# Patient Record
Sex: FEMALE | Race: WHITE | NOT HISPANIC OR LATINO | Employment: OTHER | URBAN - NONMETROPOLITAN AREA
[De-identification: names, ages, dates, MRNs, and addresses within clinical notes are randomized per-mention and may not be internally consistent; named-entity substitution may affect disease eponyms.]

---

## 2023-04-19 ENCOUNTER — APPOINTMENT (EMERGENCY)
Dept: CT IMAGING | Facility: HOSPITAL | Age: 73
End: 2023-04-19

## 2023-04-19 ENCOUNTER — APPOINTMENT (EMERGENCY)
Dept: RADIOLOGY | Facility: HOSPITAL | Age: 73
End: 2023-04-19

## 2023-04-19 ENCOUNTER — HOSPITAL ENCOUNTER (INPATIENT)
Facility: HOSPITAL | Age: 73
LOS: 8 days | Discharge: NURSING, REHAB OR LONG-TERM CARE | End: 2023-04-27
Attending: EMERGENCY MEDICINE | Admitting: FAMILY MEDICINE

## 2023-04-19 DIAGNOSIS — K92.1 HEMATOCHEZIA: ICD-10-CM

## 2023-04-19 DIAGNOSIS — K31.7 GASTRIC POLYP: ICD-10-CM

## 2023-04-19 DIAGNOSIS — R42 DIZZINESS: ICD-10-CM

## 2023-04-19 DIAGNOSIS — K31.89 GASTRIC MASS: ICD-10-CM

## 2023-04-19 DIAGNOSIS — F31.9 BIPOLAR DEPRESSION (HCC): ICD-10-CM

## 2023-04-19 DIAGNOSIS — R55 SYNCOPE: Primary | ICD-10-CM

## 2023-04-19 LAB
2HR DELTA HS TROPONIN: 1 NG/L
4HR DELTA HS TROPONIN: 0 NG/L
ALBUMIN SERPL BCP-MCNC: 4.3 G/DL (ref 3.5–5)
ALP SERPL-CCNC: 97 U/L (ref 34–104)
ALT SERPL W P-5'-P-CCNC: 36 U/L (ref 7–52)
ANION GAP SERPL CALCULATED.3IONS-SCNC: 11 MMOL/L (ref 4–13)
APTT PPP: 24 SECONDS (ref 23–37)
AST SERPL W P-5'-P-CCNC: 22 U/L (ref 13–39)
BASOPHILS # BLD AUTO: 0.04 THOUSANDS/ΜL (ref 0–0.1)
BASOPHILS NFR BLD AUTO: 0 % (ref 0–1)
BILIRUB SERPL-MCNC: 0.81 MG/DL (ref 0.2–1)
BILIRUB UR QL STRIP: NEGATIVE
BUN SERPL-MCNC: 9 MG/DL (ref 5–25)
CALCIUM SERPL-MCNC: 9.4 MG/DL (ref 8.4–10.2)
CARDIAC TROPONIN I PNL SERPL HS: 2 NG/L
CARDIAC TROPONIN I PNL SERPL HS: 2 NG/L
CARDIAC TROPONIN I PNL SERPL HS: 3 NG/L
CHLORIDE SERPL-SCNC: 104 MMOL/L (ref 96–108)
CK SERPL-CCNC: 68 U/L (ref 26–192)
CLARITY UR: CLEAR
CO2 SERPL-SCNC: 27 MMOL/L (ref 21–32)
COLOR UR: YELLOW
CREAT SERPL-MCNC: 0.9 MG/DL (ref 0.6–1.3)
EOSINOPHIL # BLD AUTO: 0.03 THOUSAND/ΜL (ref 0–0.61)
EOSINOPHIL NFR BLD AUTO: 0 % (ref 0–6)
ERYTHROCYTE [DISTWIDTH] IN BLOOD BY AUTOMATED COUNT: 14.5 % (ref 11.6–15.1)
GFR SERPL CREATININE-BSD FRML MDRD: 64 ML/MIN/1.73SQ M
GLUCOSE SERPL-MCNC: 109 MG/DL (ref 65–140)
GLUCOSE SERPL-MCNC: 117 MG/DL (ref 65–140)
GLUCOSE UR STRIP-MCNC: NEGATIVE MG/DL
HCT VFR BLD AUTO: 44.1 % (ref 34.8–46.1)
HGB BLD-MCNC: 14.4 G/DL (ref 11.5–15.4)
HGB UR QL STRIP.AUTO: NEGATIVE
IMM GRANULOCYTES # BLD AUTO: 0.13 THOUSAND/UL (ref 0–0.2)
IMM GRANULOCYTES NFR BLD AUTO: 1 % (ref 0–2)
INR PPP: 1.06 (ref 0.84–1.19)
KETONES UR STRIP-MCNC: NEGATIVE MG/DL
LEUKOCYTE ESTERASE UR QL STRIP: NEGATIVE
LYMPHOCYTES # BLD AUTO: 1.23 THOUSANDS/ΜL (ref 0.6–4.47)
LYMPHOCYTES NFR BLD AUTO: 11 % (ref 14–44)
MAGNESIUM SERPL-MCNC: 1.7 MG/DL (ref 1.9–2.7)
MCH RBC QN AUTO: 29.1 PG (ref 26.8–34.3)
MCHC RBC AUTO-ENTMCNC: 32.7 G/DL (ref 31.4–37.4)
MCV RBC AUTO: 89 FL (ref 82–98)
MONOCYTES # BLD AUTO: 0.7 THOUSAND/ΜL (ref 0.17–1.22)
MONOCYTES NFR BLD AUTO: 6 % (ref 4–12)
NEUTROPHILS # BLD AUTO: 9.5 THOUSANDS/ΜL (ref 1.85–7.62)
NEUTS SEG NFR BLD AUTO: 82 % (ref 43–75)
NITRITE UR QL STRIP: NEGATIVE
NRBC BLD AUTO-RTO: 0 /100 WBCS
PH UR STRIP.AUTO: 6.5 [PH]
PLATELET # BLD AUTO: 331 THOUSANDS/UL (ref 149–390)
PMV BLD AUTO: 9.3 FL (ref 8.9–12.7)
POTASSIUM SERPL-SCNC: 3.3 MMOL/L (ref 3.5–5.3)
PROT SERPL-MCNC: 7 G/DL (ref 6.4–8.4)
PROT UR STRIP-MCNC: NEGATIVE MG/DL
PROTHROMBIN TIME: 14 SECONDS (ref 11.6–14.5)
RBC # BLD AUTO: 4.95 MILLION/UL (ref 3.81–5.12)
SODIUM SERPL-SCNC: 142 MMOL/L (ref 135–147)
SP GR UR STRIP.AUTO: 1.01 (ref 1–1.03)
UROBILINOGEN UR QL STRIP.AUTO: 0.2 E.U./DL
WBC # BLD AUTO: 11.63 THOUSAND/UL (ref 4.31–10.16)

## 2023-04-19 PROCEDURE — 0HQ0XZZ REPAIR SCALP SKIN, EXTERNAL APPROACH: ICD-10-PCS | Performed by: EMERGENCY MEDICINE

## 2023-04-19 RX ORDER — SODIUM CHLORIDE 9 MG/ML
100 INJECTION, SOLUTION INTRAVENOUS CONTINUOUS
Status: DISCONTINUED | OUTPATIENT
Start: 2023-04-19 | End: 2023-04-19

## 2023-04-19 RX ORDER — SODIUM CHLORIDE, SODIUM GLUCONATE, SODIUM ACETATE, POTASSIUM CHLORIDE, MAGNESIUM CHLORIDE, SODIUM PHOSPHATE, DIBASIC, AND POTASSIUM PHOSPHATE .53; .5; .37; .037; .03; .012; .00082 G/100ML; G/100ML; G/100ML; G/100ML; G/100ML; G/100ML; G/100ML
100 INJECTION, SOLUTION INTRAVENOUS CONTINUOUS
Status: DISCONTINUED | OUTPATIENT
Start: 2023-04-19 | End: 2023-04-20

## 2023-04-19 RX ORDER — PANTOPRAZOLE SODIUM 40 MG/1
40 TABLET, DELAYED RELEASE ORAL 2 TIMES DAILY
COMMUNITY
End: 2023-04-27

## 2023-04-19 RX ORDER — LORAZEPAM 1 MG/1
4 TABLET ORAL
Status: DISCONTINUED | OUTPATIENT
Start: 2023-04-19 | End: 2023-04-22

## 2023-04-19 RX ORDER — LIDOCAINE HYDROCHLORIDE 10 MG/ML
10 INJECTION, SOLUTION EPIDURAL; INFILTRATION; INTRACAUDAL; PERINEURAL ONCE
Status: COMPLETED | OUTPATIENT
Start: 2023-04-19 | End: 2023-04-19

## 2023-04-19 RX ORDER — ENOXAPARIN SODIUM 100 MG/ML
40 INJECTION SUBCUTANEOUS DAILY
Status: DISCONTINUED | OUTPATIENT
Start: 2023-04-20 | End: 2023-04-24

## 2023-04-19 RX ORDER — LAMOTRIGINE 100 MG/1
200 TABLET ORAL 2 TIMES DAILY
Status: DISCONTINUED | OUTPATIENT
Start: 2023-04-19 | End: 2023-04-27 | Stop reason: HOSPADM

## 2023-04-19 RX ORDER — LORAZEPAM 2 MG/1
4 TABLET ORAL
COMMUNITY
End: 2023-04-27

## 2023-04-19 RX ORDER — ATORVASTATIN CALCIUM 40 MG/1
40 TABLET, FILM COATED ORAL DAILY
COMMUNITY
Start: 2022-12-21

## 2023-04-19 RX ORDER — ATORVASTATIN CALCIUM 40 MG/1
40 TABLET, FILM COATED ORAL DAILY
Status: DISCONTINUED | OUTPATIENT
Start: 2023-04-20 | End: 2023-04-27 | Stop reason: HOSPADM

## 2023-04-19 RX ORDER — LOSARTAN POTASSIUM 25 MG/1
25 TABLET ORAL DAILY
COMMUNITY

## 2023-04-19 RX ORDER — MECLIZINE HYDROCHLORIDE 25 MG/1
25 TABLET ORAL ONCE
Status: COMPLETED | OUTPATIENT
Start: 2023-04-19 | End: 2023-04-19

## 2023-04-19 RX ORDER — ACETAMINOPHEN 325 MG/1
650 TABLET ORAL EVERY 6 HOURS PRN
Status: DISCONTINUED | OUTPATIENT
Start: 2023-04-19 | End: 2023-04-27 | Stop reason: HOSPADM

## 2023-04-19 RX ORDER — POTASSIUM CHLORIDE 20 MEQ/1
40 TABLET, EXTENDED RELEASE ORAL ONCE
Status: COMPLETED | OUTPATIENT
Start: 2023-04-19 | End: 2023-04-19

## 2023-04-19 RX ORDER — LORAZEPAM 1 MG/1
1 TABLET ORAL 2 TIMES DAILY
Status: DISCONTINUED | OUTPATIENT
Start: 2023-04-20 | End: 2023-04-27 | Stop reason: HOSPADM

## 2023-04-19 RX ORDER — PANTOPRAZOLE SODIUM 40 MG/1
40 TABLET, DELAYED RELEASE ORAL
Status: DISCONTINUED | OUTPATIENT
Start: 2023-04-20 | End: 2023-04-24

## 2023-04-19 RX ORDER — LORAZEPAM 2 MG/1
1 TABLET ORAL 2 TIMES DAILY
Status: ON HOLD | COMMUNITY
End: 2023-04-24 | Stop reason: SDUPTHER

## 2023-04-19 RX ORDER — LAMOTRIGINE 200 MG/1
200 TABLET ORAL 2 TIMES DAILY
COMMUNITY

## 2023-04-19 RX ORDER — HYDROCHLOROTHIAZIDE 12.5 MG/1
CAPSULE, GELATIN COATED ORAL
COMMUNITY
Start: 2023-03-16 | End: 2023-04-27

## 2023-04-19 RX ORDER — ONDANSETRON 2 MG/ML
4 INJECTION INTRAMUSCULAR; INTRAVENOUS ONCE
Status: COMPLETED | OUTPATIENT
Start: 2023-04-19 | End: 2023-04-19

## 2023-04-19 RX ADMIN — IOHEXOL 100 ML: 350 INJECTION, SOLUTION INTRAVENOUS at 15:17

## 2023-04-19 RX ADMIN — ONDANSETRON 4 MG: 2 INJECTION INTRAMUSCULAR; INTRAVENOUS at 18:25

## 2023-04-19 RX ADMIN — MORPHINE SULFATE 2 MG: 2 INJECTION, SOLUTION INTRAMUSCULAR; INTRAVENOUS at 17:02

## 2023-04-19 RX ADMIN — LORAZEPAM 4 MG: 1 TABLET ORAL at 21:26

## 2023-04-19 RX ADMIN — TETANUS TOXOID, REDUCED DIPHTHERIA TOXOID AND ACELLULAR PERTUSSIS VACCINE, ADSORBED 0.5 ML: 5; 2.5; 8; 8; 2.5 SUSPENSION INTRAMUSCULAR at 15:34

## 2023-04-19 RX ADMIN — POTASSIUM CHLORIDE 40 MEQ: 1500 TABLET, EXTENDED RELEASE ORAL at 21:26

## 2023-04-19 RX ADMIN — SODIUM CHLORIDE 1000 ML: 0.9 INJECTION, SOLUTION INTRAVENOUS at 18:50

## 2023-04-19 RX ADMIN — LIDOCAINE HYDROCHLORIDE 10 ML: 10 INJECTION, SOLUTION EPIDURAL; INFILTRATION; INTRACAUDAL; PERINEURAL at 16:02

## 2023-04-19 RX ADMIN — SODIUM CHLORIDE, SODIUM GLUCONATE, SODIUM ACETATE, POTASSIUM CHLORIDE AND MAGNESIUM CHLORIDE 100 ML/HR: 526; 502; 368; 37; 30 INJECTION, SOLUTION INTRAVENOUS at 21:26

## 2023-04-19 RX ADMIN — LAMOTRIGINE 200 MG: 100 TABLET ORAL at 21:26

## 2023-04-19 RX ADMIN — MORPHINE SULFATE 2 MG: 2 INJECTION, SOLUTION INTRAMUSCULAR; INTRAVENOUS at 16:04

## 2023-04-19 RX ADMIN — MECLIZINE HYDROCHLORIDE 25 MG: 25 TABLET ORAL at 18:25

## 2023-04-19 NOTE — TRAUMA DOCUMENTATION
Pt had blood in her hair, Pt hair washed to get most of dried blood out   Pt jazmyn well and was thankful for the help

## 2023-04-19 NOTE — DISCHARGE INSTRUCTIONS
TRAUMA - CT head wo contrast   Final Result      No acute intracranial abnormality  Workstation performed: [id number]         TRAUMA - CT spine cervical wo contrast   Final Result      No cervical spine fracture or traumatic malalignment  Workstation performed: [id number]         TRAUMA - CT chest abdomen pelvis w contrast   Final Result      No CT findings of trauma  Polypoid enhancing mass in the gastric body along the lateral wall in series 2 image 111 measuring 1 8 x 1 3 cm  Correlate with GI symptoms and GI consult  Workstation performed: [id number]         XR Trauma chest portable   Final Result      No acute cardiopulmonary disease  Workstation performed: [id number]         XR Trauma pelvis ap only 1 or 2 vw   Final Result      No acute osseous abnormality        Workstation performed: [id number]

## 2023-04-19 NOTE — ED NOTES
1 staple inserted by Natali KANG to close scalp wound  Bleeding controlled       [address] RN  04/19/23 3954

## 2023-04-19 NOTE — ED NOTES
Pt failed ambulatory test  Pt stated she felt dizzy upon sitting up but was able to stand  by herself ,however upon starting to walk pt got very dizzy and was off balance   Returned to bed and felt nauseated     Migue Escobedo RN  04/19/23 2159

## 2023-04-19 NOTE — ED PROVIDER NOTES
Emergency Department Trauma Note  Art Borjas 81 y o  female MRN: [id number]  Unit/Bed#: ED 02/ED 02 Encounter: [id number]      Trauma Alert: Trauma Acuity: Trauma Evaluation  Model of Arrival: Mode of Arrival: ALS via    Trauma Team: Current Providers  Attending Provider: Tatianna Velazquez DO  Attending Provider: Lanie Tyler MD  Physician Assistant: Dilma Villasenor PA-C  Registered Nurse: Daniel Walker, RN  Registered Nurse: Madonna Haley RN  Consultants:     None      History of Present Illness     Chief Complaint:   Chief Complaint   Patient presents with   • Syncope     That resulted in falling down about 12 wooden steps  Biggest complaint is head  and neck pain  Also has a small abrasion on her right knee     HPI:  Art Borjas is a 81 y o  female who presents with headache/neck pain s/p syncope and fall  Mechanism:Details of Incident: patient was on toilet and stook up, falling into boxes and then down about 12 steps  Injury Date: 04/19/23 Injury Time: 1333 Injury Occurence Location - [address] [place] [address] Way: at home    30-year-old female presents the emergency department for evaluation  Patient states earlier today she sat down on the toilet after a shower to put her underwear on  States she was feeling dizzy and stood up to walk to her bedroom  States while walking to her bedroom the dizziness worsened and the last thing she remembers is seeing boxes in her bedroom  States she then passed out  Woke up down a flight of 12 steps  She does not remember falling down the steps  States when she did come to she was able to crawl to the phone and call for help  Reports she has headache and neck pain  Also reports pain on the inner aspect of the right thigh near the groin  She is small abrasion to the right knee  No visual changes  Patient denies any use of anticoagulation  She denies any chest pain, palpitations, shortness of breath  Denies any nausea or vomiting  Denies any abdominal pain    She The pt was encountered on [employer]- she is on a [employer] P 500 support surface for low air-loss and pressure redistribution features. Staff have been assisting her with turning and positioning. Mrs Lewis has Complete [employer] boots in place to her heels for off-loading. She was seen by nutrition and had a SLP evaluation as well.   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 the periwound skin is intact. The feet are dry and flaking in general, an area of dark, dry flaking tissue was noted on the right heel as well.  The PT pulse on the left foot was weak, the DP pulse on the right foot was +.    the area was tender to touch.  Discussed with the medical team they will f/u with a Vascular consult for further evaluation, Spoke with wound team Podiatrist Dr Brock and requested a Podiatry consult for further evaluation as well. denies any loss of bowel or bladder control  Has not eaten today  Reports she took her morning pills and then went to get a shower which is why she hadn't eaten  States she has had occasional diarrhea recently  Also reports recent unintentional weight loss  History of breast cancer 12 years ago  History provided by:  Patient  Syncope  Episode history:  Single  Most recent episode: Today  Duration: Unsure  Progression:  Partially resolved  Chronicity:  New  Witnessed: no    Associated symptoms: dizziness and headaches    Associated symptoms: no diaphoresis, no fever, no nausea and no seizures      Review of Systems   Constitutional: Negative for appetite change, chills, diaphoresis, fatigue and fever  Respiratory: Negative  Cardiovascular: Positive for syncope  Gastrointestinal: Negative for nausea  Musculoskeletal: Positive for arthralgias, back pain and neck pain  Skin: Positive for wound  Neurological: Positive for dizziness, syncope and headaches  Negative for seizures, facial asymmetry and speech difficulty  Psychiatric/Behavioral: The patient is nervous/anxious  All other systems reviewed and are negative  Historical Information     Immunizations:   Immunization History   Administered Date(s) Administered   • Tdap 04/19/2023       Past Medical History:   Diagnosis Date   • Anxiety    • Disease of thyroid gland    • Hypertension      History reviewed  No pertinent family history  History reviewed  No pertinent surgical history  Social History     Tobacco Use   • Smoking status: Never   • Smokeless tobacco: Never   Vaping Use   • Vaping Use: Never used   Substance Use Topics   • Alcohol use: Never   • Drug use: Never     E-Cigarette/Vaping   • E-Cigarette Use Never User      E-Cigarette/Vaping Substances       Family History: non-contributory    Meds/Allergies   Prior to Admission Medications   Prescriptions Last Dose Informant Patient Reported? Taking?    LORazepam (ATIVAN) 2 mg tablet 4/19/2023  Yes Yes   Sig: Take 1 mg by mouth 2 (two) times a day Morning and afternoon   LORazepam (ATIVAN) 2 mg tablet 4/18/2023  Yes Yes   Sig: Take 4 mg by mouth daily at bedtime   OMEPRAZOLE PO   Yes No   Sig: Take by mouth   atorvastatin (LIPITOR) 40 mg tablet 4/19/2023  Yes Yes   Sig: Take 40 mg by mouth daily   hydrochlorothiazide (MICROZIDE) 12 5 mg capsule 4/19/2023  Yes Yes   Sig: TAKE 1 CAPSULE BY MOUTH EVERY MORNING FOR BLOOD PRESSURE *PT CANNOT CUT TABLETS, SEND FULL TABS/CAPS*   lamoTRIgine (LaMICtal) 200 MG tablet 4/19/2023  Yes Yes   Sig: Take 200 mg by mouth 2 (two) times a day   losartan (COZAAR) 25 mg tablet 4/19/2023  Yes Yes   Sig: Take 25 mg by mouth daily   pantoprazole (PROTONIX) 40 mg tablet 4/19/2023  Yes Yes   Sig: Take 40 mg by mouth 2 (two) times a day      Facility-Administered Medications: None       Allergies   Allergen Reactions   • Vitamin B12 Hives   • Erythromycin Other (See Comments)     Vomiting  • Escitalopram Other (See Comments)   • Lisinopril Cough   • Other Itching and Dizziness   • Paroxetine Other (See Comments)       PHYSICAL EXAM    PE limited by: none    Objective   Vitals:   First set: Temperature: 98 4 °F (36 9 °C) (04/19/23 1452)  Pulse: 81 (04/19/23 1452)  Respirations: 16 (04/19/23 1452)  Blood Pressure: (!) 217/112 (04/19/23 1452)  SpO2: 93 % (04/19/23 1452)    Primary Survey:   (A) Airway: Intact  (B) Breathing: Spontaneously  (C) Circulation: Pulses:   normal  (D) Disabliity:  GCS Total:  15  (E) Expose:  Completed    Secondary Survey: (Click on Physical Exam tab above)  Physical Exam    Cervical spine cleared by clinical criteria?  No (imaging required)      Invasive Devices     Peripheral Intravenous Line  Duration           Peripheral IV 04/19/23 Proximal;Right;Ventral (anterior) Forearm <1 day    Peripheral IV 04/19/23 Right Antecubital <1 day                Lab Results:   Results Reviewed     Procedure Component Value Units Date/Time    HS Troponin I 4hr [[id number]]  (Normal) Collected: 04/19/23 1928    Lab Status: Final result Specimen: Blood from Arm, Right Updated: 04/19/23 1958     hs TnI 4hr 2 ng/L      Delta 4hr hsTnI 0 ng/L     HS Troponin I 2hr [[id number]]  (Normal) Collected: 04/19/23 1725    Lab Status: Final result Specimen: Blood from Arm, Right Updated: 04/19/23 1807     hs TnI 2hr 3 ng/L      Delta 2hr hsTnI 1 ng/L     HS Troponin 0hr (reflex protocol) [[id number]]  (Normal) Collected: 04/19/23 1525    Lab Status: Final result Specimen: Blood from Arm, Right Updated: 04/19/23 1558     hs TnI 0hr 2 ng/L     UA w Reflex to Microscopic w Reflex to Culture [[id number]] Collected: 04/19/23 1551    Lab Status: Final result Specimen: Urine, Clean Catch Updated: 04/19/23 1555     Color, UA Yellow     Clarity, UA Clear     Specific Gravity, UA 1 010     pH, UA 6 5     Leukocytes, UA Negative     Nitrite, UA Negative     Protein, UA Negative mg/dl      Glucose, UA Negative mg/dl      Ketones, UA Negative mg/dl      Urobilinogen, UA 0 2 E U /dl      Bilirubin, UA Negative     Occult Blood, UA Negative    Comprehensive metabolic panel [[id number]]  (Abnormal) Collected: 04/19/23 1525    Lab Status: Final result Specimen: Blood from Arm, Right Updated: 04/19/23 1550     Sodium 142 mmol/L      Potassium 3 3 mmol/L      Chloride 104 mmol/L      CO2 27 mmol/L      ANION GAP 11 mmol/L      BUN 9 mg/dL      Creatinine 0 90 mg/dL      Glucose 117 mg/dL      Calcium 9 4 mg/dL      AST 22 U/L      ALT 36 U/L      Alkaline Phosphatase 97 U/L      Total Protein 7 0 g/dL      Albumin 4 3 g/dL      Total Bilirubin 0 81 mg/dL      eGFR 64 ml/min/1 73sq m     Narrative:      Nathan guidelines for Chronic Kidney Disease (CKD):   •  Stage 1 with normal or high GFR (GFR > 90 mL/min/1 73 square meters)  •  Stage 2 Mild CKD (GFR = 60-89 mL/min/1 73 square meters)  •  Stage 3A Moderate CKD (GFR = 45-59 mL/min/1 73 square meters)  •  Stage 3B Moderate CKD (GFR = 30-44 mL/min/1 73 square meters)  •  Stage 4 Severe CKD (GFR = 15-29 mL/min/1 73 square meters)  •  Stage 5 End Stage CKD (GFR <15 mL/min/1 73 square meters)  Note: GFR calculation is accurate only with a steady state creatinine    CK [[id number]]  (Normal) Collected: 04/19/23 1525    Lab Status: Final result Specimen: Blood from Arm, Right Updated: 04/19/23 1550     Total CK 68 U/L     Protime-INR [[id number]]  (Normal) Collected: 04/19/23 1525    Lab Status: Final result Specimen: Blood from Arm, Right Updated: 04/19/23 1547     Protime 14 0 seconds      INR 1 06    APTT [[id number]]  (Normal) Collected: 04/19/23 1525    Lab Status: Final result Specimen: Blood from Arm, Right Updated: 04/19/23 1547     PTT 24 seconds     Fingerstick Glucose (POCT) [[id number]]  (Normal) Collected: 04/19/23 1539    Lab Status: Final result Updated: 04/19/23 1540     POC Glucose 109 mg/dl     CBC and differential [[id number]]  (Abnormal) Collected: 04/19/23 1525    Lab Status: Final result Specimen: Blood from Arm, Right Updated: 04/19/23 1534     WBC 11 63 Thousand/uL      RBC 4 95 Million/uL      Hemoglobin 14 4 g/dL      Hematocrit 44 1 %      MCV 89 fL      MCH 29 1 pg      MCHC 32 7 g/dL      RDW 14 5 %      MPV 9 3 fL      Platelets 798 Thousands/uL      nRBC 0 /100 WBCs      Neutrophils Relative 82 %      Immat GRANS % 1 %      Lymphocytes Relative 11 %      Monocytes Relative 6 %      Eosinophils Relative 0 %      Basophils Relative 0 %      Neutrophils Absolute 9 50 Thousands/µL      Immature Grans Absolute 0 13 Thousand/uL      Lymphocytes Absolute 1 23 Thousands/µL      Monocytes Absolute 0 70 Thousand/µL      Eosinophils Absolute 0 03 Thousand/µL      Basophils Absolute 0 04 Thousands/µL                  Imaging Studies:   Direct to CT: No  TRAUMA - CT head wo contrast   Final Result by Josie Stewart MD (04/19 1547)      No acute intracranial abnormality                    Workstation performed: [id number]         TRAUMA - CT spine cervical wo contrast   Final Result by Gaby Mcgill MD (04/19 1547)      No cervical spine fracture or traumatic malalignment  Workstation performed: [id number]         TRAUMA - CT chest abdomen pelvis w contrast   Final Result by Gaby Mcgill MD (04/19 1547)      No CT findings of trauma  Polypoid enhancing mass in the gastric body along the lateral wall in series 2 image 111 measuring 1 8 x 1 3 cm  Correlate with GI symptoms and GI consult  Workstation performed: [id number]         XR Trauma chest portable   Final Result by Gaby Mcgill MD (04/19 8426)      No acute cardiopulmonary disease  Workstation performed: [id number]         XR Trauma pelvis ap only 1 or 2 vw   Final Result by Gaby Mcgill MD (04/19 1527)      No acute osseous abnormality        Workstation performed: [id number]               Procedures  ECG 12 Lead Documentation Only    Date/Time: 4/19/2023 3:36 PM  Performed by: Joe Chowdhury PA-C  Authorized by: Joe Chowdhury PA-C     Indications / Diagnosis:  Syncope  Patient location:  ED  Interpretation:     Interpretation: non-specific    Rate:     ECG rate:  76    ECG rate assessment: normal    Rhythm:     Rhythm: sinus rhythm    Ectopy:     Ectopy: none    QRS:     QRS axis:  Normal    QRS intervals:  Normal  ECG 12 Lead Documentation Only    Date/Time: 4/19/2023 6:50 PM  Performed by: Joe Chowdhury PA-C  Authorized by: Joe Chowdhury PA-C     Indications / Diagnosis:  Near sycope, hypotension  Patient location:  ED  Interpretation:     Interpretation: non-specific    Rate:     ECG rate:  63    ECG rate assessment: normal    Rhythm:     Rhythm: sinus rhythm    Ectopy:     Ectopy: none    QRS:     QRS axis:  Normal    QRS intervals:  Normal  Conduction:     Conduction: normal    Laceration repair    Date/Time: 4/19/2023 4:00 PM  Performed by: Joe Chowdhury "SAM  Authorized by: Tammy Ramirez PA-C   Risks and benefits: risks, benefits and alternatives were discussed  Consent given by: patient  Patient understanding: patient states understanding of the procedure being performed  Patient consent: the patient's understanding of the procedure matches consent given  Radiology Images displayed and confirmed  If images not available, report reviewed: imaging studies available  Patient identity confirmed: verbally with patient and arm band  Time out: Immediately prior to procedure a \"time out\" was called to verify the correct patient, procedure, equipment, support staff and site/side marked as required  Body area: head/neck  Location details: scalp  Laceration length: 3 cm  Foreign bodies: no foreign bodies  Tendon involvement: none  Nerve involvement: none    Sedation:  Patient sedated: no      Wound Dehiscence:  Superficial Wound Dehiscence: simple closure      Procedure Details:  Irrigation solution: saline  Irrigation method: syringe  Amount of cleaning: standard  Debridement: none  Degree of undermining: none  Skin closure: staples and glue  Patient tolerance: patient tolerated the procedure well with no immediate complications  Comments: Patient was offered local infiltrate of analgesia and refused  1 staple was placed  Hair tie technique and skin glue applied               ED Course  ED Course as of 04/19/23 2001 Wed Apr 19, 2023   1504  trauma evaluation called on arrival   1514  ED interpretation of chest x-ray is negative for  pneumothorax  ED interpretation of pelvic x-ray negative for acute osseous injury   1529 Chest xray: No acute cardiopulmonary disease  1530 Pelvis xray: No acute osseous abnormality  1552 CT head: No acute intracranial abnormality  1552 CT cspine: No cervical spine fracture or traumatic malalignment     1552 CT chestabdpelvis: No CT findings of trauma      Polypoid enhancing mass in the gastric body along the lateral wall in series 2 " image 111 measuring 1 8 x 1 3 cm  Correlate with GI symptoms and GI consult  1552 Cervical Collar Clearance: The patient had a CT scan of the cervical spine demonstrating no acute injury  On exam, the patient had no midline point tenderness or paresthesias/numbness/weakness in the extremities  The patient had full range of motion (was then able to flex, extend, and rotate head laterally) without pain  There were no distracting injuries and the patient was not intoxicated  The patient's cervical spine was cleared radiologically and clinically  Cervical collar removed at this time  Jonathan Andrew PA-C  4/19/2023 3:52 PM        1610 hs TnI 0hr: 2   1657 Patient still have CABA  Will give additional pain medications   1807 Delta 2hr hsTnI: 1   1811 Headache improved  Patient will try to stand and ambulate  1821 Patient was able to stand but unable to ambulate  Felt dizzy and the room was going to spinning  Got nauseated   1855 Patient received Zofran and meclizine  Was then laying and started to feel unwell  Blood pressure dropped to 70/44 patient became pale  Hypoxic  Currently requiring 2 5 L nasal cannula  EKG was obtained, unchanged from previous  Patient was started on fluids placed in Trendelenburg  BP increasing  Patient with some improvement  3011 Accepted to medicine           Medical Decision Making  68-year-old female presented to the emergency department for evaluation status post syncopal episode  Vitals and medical record reviewed  Differential diagnosis included but not limited to MI, vasovagal syncope, electrolyte abnormality, traumatic injury  Trauma evaluation was called on arrival   Patient was pan scanned which was negative for traumatic injury noted for gastric mats which patient was made aware of  Patient's EKG was nonischemic, troponin negative x2  Electrolytes relatively unremarkable  Attempted to have patient's stand and ambulate she became dizzy and nauseated  Was treated symptomatically  Symptoms worsened and medication became hypoxic and hypotensive  Improved with fluids  Patient tolerated p o  diet  Wilner with medicine who accepted the patient for admission for continued evaluation and work up  Gastric mass: acute illness or injury  Syncope: acute illness or injury  Amount and/or Complexity of Data Reviewed  Labs: ordered  Decision-making details documented in ED Course  Radiology: ordered  ECG/medicine tests: ordered and independent interpretation performed  Discussion of management or test interpretation with external provider(s): Discussion with medicine for admission    Risk  Prescription drug management  Decision regarding hospitalization  Disposition  [facility] Transfer: No  Final diagnoses:   Syncope   Gastric mass     Time reflects when diagnosis was documented in both MDM as applicable and the Disposition within this note     Time User Action Codes Description Comment    4/19/2023  7:18 PM Tierra Loya [R55] Syncope     4/19/2023  7:18 PM Tierra Loya [K31 89] Gastric mass       ED Disposition     ED Disposition   Admit    Condition   Stable    Date/Time   Wed Apr 19, 2023  7:18 PM    Comment   Case was discussed with Giuseppe and the patient's admission status was agreed to be Admission Status: inpatient status to the service of Dr Juan Orr   Follow-up Information    None       Patient's Medications   Discharge Prescriptions    No medications on file     No discharge procedures on file      PDMP Review     None          ED Provider  Electronically Signed by         Jody Velazquez PA-C  04/19/23 2001

## 2023-04-20 ENCOUNTER — APPOINTMENT (INPATIENT)
Dept: MRI IMAGING | Facility: HOSPITAL | Age: 73
End: 2023-04-20

## 2023-04-20 PROBLEM — R19.7 DIARRHEA OF PRESUMED INFECTIOUS ORIGIN: Status: ACTIVE | Noted: 2023-04-20

## 2023-04-20 PROBLEM — E43 SEVERE PROTEIN-CALORIE MALNUTRITION (HCC): Status: ACTIVE | Noted: 2023-04-20

## 2023-04-20 PROBLEM — K31.7 GASTRIC POLYP: Status: ACTIVE | Noted: 2023-04-20

## 2023-04-20 PROBLEM — F31.9 BIPOLAR DEPRESSION (HCC): Status: ACTIVE | Noted: 2023-04-20

## 2023-04-20 PROBLEM — S01.01XA SCALP LACERATION: Status: ACTIVE | Noted: 2023-04-20

## 2023-04-20 PROBLEM — I10 PRIMARY HYPERTENSION: Status: ACTIVE | Noted: 2023-04-20

## 2023-04-20 LAB
ANION GAP SERPL CALCULATED.3IONS-SCNC: 7 MMOL/L (ref 4–13)
ATRIAL RATE: 63 BPM
ATRIAL RATE: 76 BPM
BUN SERPL-MCNC: 8 MG/DL (ref 5–25)
CALCIUM SERPL-MCNC: 8.3 MG/DL (ref 8.4–10.2)
CHLORIDE SERPL-SCNC: 109 MMOL/L (ref 96–108)
CO2 SERPL-SCNC: 25 MMOL/L (ref 21–32)
CREAT SERPL-MCNC: 0.76 MG/DL (ref 0.6–1.3)
ERYTHROCYTE [DISTWIDTH] IN BLOOD BY AUTOMATED COUNT: 14.6 % (ref 11.6–15.1)
GFR SERPL CREATININE-BSD FRML MDRD: 79 ML/MIN/1.73SQ M
GLUCOSE SERPL-MCNC: 84 MG/DL (ref 65–140)
HCT VFR BLD AUTO: 38.3 % (ref 34.8–46.1)
HGB BLD-MCNC: 12.4 G/DL (ref 11.5–15.4)
MCH RBC QN AUTO: 29.2 PG (ref 26.8–34.3)
MCHC RBC AUTO-ENTMCNC: 32.4 G/DL (ref 31.4–37.4)
MCV RBC AUTO: 90 FL (ref 82–98)
P AXIS: 19 DEGREES
P AXIS: 23 DEGREES
PLATELET # BLD AUTO: 279 THOUSANDS/UL (ref 149–390)
PMV BLD AUTO: 9.3 FL (ref 8.9–12.7)
POTASSIUM SERPL-SCNC: 3.8 MMOL/L (ref 3.5–5.3)
PR INTERVAL: 172 MS
PR INTERVAL: 172 MS
QRS AXIS: -12 DEGREES
QRS AXIS: -2 DEGREES
QRSD INTERVAL: 78 MS
QRSD INTERVAL: 88 MS
QT INTERVAL: 356 MS
QT INTERVAL: 436 MS
QTC INTERVAL: 400 MS
QTC INTERVAL: 446 MS
RBC # BLD AUTO: 4.25 MILLION/UL (ref 3.81–5.12)
SODIUM SERPL-SCNC: 141 MMOL/L (ref 135–147)
T WAVE AXIS: 106 DEGREES
T WAVE AXIS: 129 DEGREES
VENTRICULAR RATE: 63 BPM
VENTRICULAR RATE: 76 BPM
WBC # BLD AUTO: 8.09 THOUSAND/UL (ref 4.31–10.16)

## 2023-04-20 RX ORDER — ENOXAPARIN SODIUM 100 MG/ML
40 INJECTION SUBCUTANEOUS DAILY
Status: CANCELLED | OUTPATIENT
Start: 2023-04-21

## 2023-04-20 RX ORDER — MECLIZINE HYDROCHLORIDE 25 MG/1
25 TABLET ORAL EVERY 8 HOURS PRN
Status: DISCONTINUED | OUTPATIENT
Start: 2023-04-20 | End: 2023-04-22

## 2023-04-20 RX ORDER — ASPIRIN 81 MG/1
81 TABLET ORAL DAILY
Status: DISCONTINUED | OUTPATIENT
Start: 2023-04-20 | End: 2023-04-24

## 2023-04-20 RX ORDER — OXYCODONE HYDROCHLORIDE 5 MG/1
5 TABLET ORAL EVERY 6 HOURS PRN
Status: DISCONTINUED | OUTPATIENT
Start: 2023-04-20 | End: 2023-04-27 | Stop reason: HOSPADM

## 2023-04-20 RX ORDER — LORAZEPAM 0.5 MG/1
0.5 TABLET ORAL EVERY 8 HOURS PRN
Status: DISCONTINUED | OUTPATIENT
Start: 2023-04-20 | End: 2023-04-23

## 2023-04-20 RX ADMIN — ATORVASTATIN CALCIUM 40 MG: 40 TABLET, FILM COATED ORAL at 07:50

## 2023-04-20 RX ADMIN — SODIUM CHLORIDE, SODIUM GLUCONATE, SODIUM ACETATE, POTASSIUM CHLORIDE AND MAGNESIUM CHLORIDE 100 ML/HR: 526; 502; 368; 37; 30 INJECTION, SOLUTION INTRAVENOUS at 07:49

## 2023-04-20 RX ADMIN — ASPIRIN 81 MG: 81 TABLET, COATED ORAL at 13:48

## 2023-04-20 RX ADMIN — OXYCODONE HYDROCHLORIDE 5 MG: 5 TABLET ORAL at 10:35

## 2023-04-20 RX ADMIN — LORAZEPAM 4 MG: 1 TABLET ORAL at 22:02

## 2023-04-20 RX ADMIN — LAMOTRIGINE 200 MG: 100 TABLET ORAL at 17:23

## 2023-04-20 RX ADMIN — PANTOPRAZOLE SODIUM 40 MG: 40 TABLET, DELAYED RELEASE ORAL at 05:49

## 2023-04-20 RX ADMIN — LAMOTRIGINE 200 MG: 100 TABLET ORAL at 07:50

## 2023-04-20 RX ADMIN — OXYCODONE HYDROCHLORIDE 5 MG: 5 TABLET ORAL at 04:31

## 2023-04-20 RX ADMIN — ENOXAPARIN SODIUM 40 MG: 40 INJECTION SUBCUTANEOUS at 07:51

## 2023-04-20 RX ADMIN — MECLIZINE HYDROCHLORIDE 25 MG: 25 TABLET ORAL at 14:00

## 2023-04-20 RX ADMIN — LORAZEPAM 1 MG: 1 TABLET ORAL at 14:00

## 2023-04-20 RX ADMIN — LORAZEPAM 1 MG: 1 TABLET ORAL at 07:50

## 2023-04-20 NOTE — ASSESSMENT & PLAN NOTE
· PTA maintained on losartan 50 mg daily and hydrochlorothiazide 12 5 at home  · Syncopal episode with suspected orthostatic hypotension we will hold hydrochlorothiazide and losartan pending further evaluation  · Trend blood pressure

## 2023-04-20 NOTE — NURSING NOTE
Pt only oob for a short period of time after working with PT, stated that she felt cold, dizzy, c/o pain to head, neck, and back  Requesting to bet back to bed, assisted with walker, slight lean to right side with walker  Continues with pain and dizziness after lying down  Encouraged to get oob with meals and use the bathroom to prevent weakness which was also a complaint  Will continue to monitor

## 2023-04-20 NOTE — PLAN OF CARE
Problem: Potential for Falls  Goal: Patient will remain free of falls  Description: INTERVENTIONS:  - Educate patient/family on patient safety including physical limitations  - Instruct patient to call for assistance with activity   - Consult OT/PT to assist with strengthening/mobility   - Keep Call bell within reach  - Keep bed low and locked with side rails adjusted as appropriate  - Keep care items and personal belongings within reach  - Initiate and maintain comfort rounds  - Make Fall Risk Sign visible to staff    - Apply yellow socks and bracelet for high fall risk patients  - Consider moving patient to room near nurses station  Outcome: Progressing     Problem: PAIN - ADULT  Goal: Verbalizes/displays adequate comfort level or baseline comfort level  Description: Interventions:  - Encourage patient to monitor pain and request assistance  - Assess pain using appropriate pain scale  - Administer analgesics based on type and severity of pain and evaluate response  - Implement non-pharmacological measures as appropriate and evaluate response  - Consider cultural and social influences on pain and pain management  - Notify physician/advanced practitioner if interventions unsuccessful or patient reports new pain  Outcome: Progressing     Problem: INFECTION - ADULT  Goal: Absence or prevention of progression during hospitalization  Description: INTERVENTIONS:  - Assess and monitor for signs and symptoms of infection  - Monitor lab/diagnostic results  - Monitor all insertion sites, i e  indwelling lines, tubes, and drains  - Monitor endotracheal if appropriate and nasal secretions for changes in amount and color  - Penelope appropriate cooling/warming therapies per order  - Administer medications as ordered  - Instruct and encourage patient and family to use good hand hygiene technique  - Identify and instruct in appropriate isolation precautions for identified infection/condition  Outcome: Progressing     Problem: SAFETY ADULT  Goal: Patient will remain free of falls  Description: INTERVENTIONS:  - Educate patient/family on patient safety including physical limitations  - Instruct patient to call for assistance with activity   - Consult OT/PT to assist with strengthening/mobility   - Keep Call bell within reach  - Keep bed low and locked with side rails adjusted as appropriate  - Keep care items and personal belongings within reach  - Initiate and maintain comfort rounds  - Make Fall Risk Sign visible to staff    - Apply yellow socks and bracelet for high fall risk patients  - Consider moving patient to room near nurses station  Outcome: Progressing  Goal: Maintain or return to baseline ADL function  Description: INTERVENTIONS:  -  Assess patient's ability to carry out ADLs; assess patient's baseline for ADL function and identify physical deficits which impact ability to perform ADLs (bathing, care of mouth/teeth, toileting, grooming, dressing, etc )  - Assess/evaluate cause of self-care deficits   - Assess range of motion  - Assess patient's mobility; develop plan if impaired  - Assess patient's need for assistive devices and provide as appropriate  - Encourage maximum independence but intervene and supervise when necessary  - Involve family in performance of ADLs  - Assess for home care needs following discharge   - Consider OT consult to assist with ADL evaluation and planning for discharge  - Provide patient education as appropriate  Outcome: Progressing  Goal: Maintains/Returns to pre admission functional level  Description: INTERVENTIONS:  - Perform BMAT or MOVE assessment daily    - Set and communicate daily mobility goal to care team and patient/family/caregiver     - Collaborate with rehabilitation services on mobility goals if consulted    - Out of bed for toileting  - Record patient progress and toleration of activity level   Outcome: Progressing     Problem: DISCHARGE PLANNING  Goal: Discharge to home or other facility with appropriate resources  Description: INTERVENTIONS:  - Identify barriers to discharge w/patient and caregiver  - Arrange for needed discharge resources and transportation as appropriate  - Identify discharge learning needs (meds, wound care, etc )  - Arrange for interpretive services to assist at discharge as needed  - Refer to Case Management Department for coordinating discharge planning if the patient needs post-hospital services based on physician/advanced practitioner order or complex needs related to functional status, cognitive ability, or social support system  Outcome: Progressing     Problem: Knowledge Deficit  Goal: Patient/family/caregiver demonstrates understanding of disease process, treatment plan, medications, and discharge instructions  Description: Complete learning assessment and assess knowledge base    Interventions:  - Provide teaching at level of understanding  - Provide teaching via preferred learning methods  Outcome: Progressing     Problem: MOBILITY - ADULT  Goal: Maintain or return to baseline ADL function  Description: INTERVENTIONS:  -  Assess patient's ability to carry out ADLs; assess patient's baseline for ADL function and identify physical deficits which impact ability to perform ADLs (bathing, care of mouth/teeth, toileting, grooming, dressing, etc )  - Assess/evaluate cause of self-care deficits   - Assess range of motion  - Assess patient's mobility; develop plan if impaired  - Assess patient's need for assistive devices and provide as appropriate  - Encourage maximum independence but intervene and supervise when necessary  - Involve family in performance of ADLs  - Assess for home care needs following discharge   - Consider OT consult to assist with ADL evaluation and planning for discharge  - Provide patient education as appropriate  Outcome: Progressing  Goal: Maintains/Returns to pre admission functional level  Description: INTERVENTIONS:  - Perform BMAT or MOVE assessment daily    - Set and communicate daily mobility goal to care team and patient/family/caregiver  - Collaborate with rehabilitation services on mobility goals if consulted    - Out of bed for toileting  - Record patient progress and toleration of activity level   Outcome: Progressing     Problem: Nutrition/Hydration-ADULT  Goal: Nutrient/Hydration intake appropriate for improving, restoring or maintaining nutritional needs  Description: Monitor and assess patient's nutrition/hydration status for malnutrition  Collaborate with interdisciplinary team and initiate plan and interventions as ordered  Monitor patient's weight and dietary intake as ordered or per policy  Utilize nutrition screening tool and intervene as necessary  Determine patient's food preferences and provide high-protein, high-caloric foods as appropriate       INTERVENTIONS:  - Monitor oral intake, urinary output, labs, and treatment plans  - Assess nutrition and hydration status and recommend course of action  - Evaluate amount of meals eaten  - Assist patient with eating if necessary   - Allow adequate time for meals  - Recommend/ encourage appropriate diets, oral nutritional supplements, and vitamin/mineral supplements  - Order, calculate, and assess calorie counts as needed  - Recommend, monitor, and adjust tube feedings and TPN/PPN based on assessed needs  - Assess need for intravenous fluids  - Provide specific nutrition/hydration education as appropriate  - Include patient/family/caregiver in decisions related to nutrition  Outcome: Progressing

## 2023-04-20 NOTE — ASSESSMENT & PLAN NOTE
· Gastric polyp, incidental finding on CT abdomen pelvis during trauma work-up of 1 8 and 1 3 cm along the lateral wall  · Extensive history of esophagitis  · Continue PTA Protonix  · We will keep n p o  and appreciate GI consultation for possible EGD

## 2023-04-20 NOTE — ASSESSMENT & PLAN NOTE
· Gastric polyp, incidental finding on CT abdomen pelvis during trauma work-up of 1 8 and 1 3 cm along the lateral wall  · Extensive history of esophagitis  · Continue PTA Protonix

## 2023-04-20 NOTE — H&P (VIEW-ONLY)
Consultation - [address] [facility] at [place] [address] 81 y o  female MRN: [id number]  Unit/Bed#: -01 Encounter: [id number]        Consults    Reason for Consult / Principal Problem:     Diarrhea  Unintentional weight loss  Abnormal CT imaging of stomach  Syncope          ASSESSMENT AND PLAN:      Diarrhea  Unintentional weight loss  GERD  Abnormal CT imaging of stomach  Syncope  -Patient states no bowel movements for the past 3 days including during this admission which makes infectious etiology less likely  -Symptoms controlled on PPI continue home dose  -Low suspicion that CT findings of the stomach are responsible for any symptoms however if this was in an active NET could explain diarrhea such as from excessive serotonin or gastrin secretion )  -We will need an eventual EGD +/-EUS for further elevation but does not need to be done on an urgent basis-  -recommend working up syncope and stabilizing patient prior to undergoing anesthesia  -Patient with labile blood pressures during this admission as high as 217/112 as low as 70/44 currently within acceptable parameters but with complaints of dizziness  -Hold off on EGD for now until medical optimization  -Consider starting on clear liquids and if tolerates advance as tolerated to non lactose-containing low fiber diet    ______________________________________________________________________    HPI:  Brianda Singh is a/an 81 y o  female seen in consultation for diarrhea, unintentional weight loss and abnormal CT imaging of the stomach  Past medical history that includes hypothyroidism, hypertension, breast cancer, anxiety/depression/bipolar  Presents with history of 3 weeks postprandial nonbloody diarrhea up to 3-4 times per day  This has been associated with nausea but no vomiting and unintentional weight loss of about 20 to 25 pounds  He denies any new medications or changes in her medications    She denies excessive NSAID use  Denies any recent antibiotics  States that her home health aide has been sick with a URI recently  Denies fevers  Urinary symptoms  She states she is also been feeling lightheaded and weak during this time  She got out of the bath and had a syncopal event which she claims she had loss of consciousness but no loss of urine or bowel function  Has never happened to her before  She denies any prodromal symptoms such as chest pain, palpitations, nausea, shortness of breath  She does endorse some lightheadedness and weakness  She states that she has not had any bowel movements for the past 3 days nor has she had any since admission  She endorses heartburn but is controlled on her outpatient medications  Denies any dysphagia  Denies early satiety  No previous EGD  Last colonoscopy 4 to 5 years ago with Dr Fabián Akers and states that she had some polyps removed and repeat recommended in 5 years  Mild leukocytosis but hemoglobin normal   Mild hypokalemia on admission but renal function intact  Liver enzymes less than 1 5 times upper level normal for women with regards to her ALT  CT: Polypoid enhancing mass in the gastric body along the lateral wall in series 2 image 111 measuring 1 8 x 1 3 cm  Correlate with GI symptoms and GI consult          REVIEW OF SYSTEMS:    Review of Systems   Constitutional: Positive for appetite change and unexpected weight change  HENT: Negative for trouble swallowing  Respiratory: Negative for shortness of breath  Cardiovascular: Negative for chest pain and palpitations  Gastrointestinal: Positive for diarrhea and nausea  Negative for abdominal distention, abdominal pain, blood in stool, constipation and vomiting  Genitourinary: Negative for dysuria  Musculoskeletal: Positive for gait problem  Skin: Negative for color change  Neurological: Positive for dizziness and syncope  Psychiatric/Behavioral: Positive for dysphoric mood  "        Historical Information   Past Medical History:   Diagnosis Date   • Anxiety    • Breast cancer ([facility] [address] )    • Disease of thyroid gland    • Esophagitis    • Hypertension      Past Surgical History:   Procedure Laterality Date   • BREAST LUMPECTOMY       Social History   Social History     Substance and Sexual Activity   Alcohol Use Never     Social History     Substance and Sexual Activity   Drug Use Never     Social History     Tobacco Use   Smoking Status Never   Smokeless Tobacco Never     History reviewed  No pertinent family history  Meds/Allergies     Medications Prior to Admission   Medication   • atorvastatin (LIPITOR) 40 mg tablet   • hydrochlorothiazide (MICROZIDE) 12 5 mg capsule   • lamoTRIgine (LaMICtal) 200 MG tablet   • LORazepam (ATIVAN) 2 mg tablet   • LORazepam (ATIVAN) 2 mg tablet   • losartan (COZAAR) 25 mg tablet   • pantoprazole (PROTONIX) 40 mg tablet     Current Facility-Administered Medications   Medication Dose Route Frequency   • acetaminophen (TYLENOL) tablet 650 mg  650 mg Oral Q6H PRN   • atorvastatin (LIPITOR) tablet 40 mg  40 mg Oral Daily   • enoxaparin (LOVENOX) subcutaneous injection 40 mg  40 mg Subcutaneous Daily   • lamoTRIgine (LaMICtal) tablet 200 mg  200 mg Oral BID   • LORazepam (ATIVAN) tablet 1 mg  1 mg Oral BID   • LORazepam (ATIVAN) tablet 4 mg  4 mg Oral HS   • multi-electrolyte (PLASMALYTE-A/ISOLYTE-S PH 7 4) IV solution  100 mL/hr Intravenous Continuous   • oxyCODONE (ROXICODONE) IR tablet 5 mg  5 mg Oral Q6H PRN   • pantoprazole (PROTONIX) EC tablet 40 mg  40 mg Oral Early Morning       Allergies   Allergen Reactions   • Vitamin B12 Hives   • Erythromycin Other (See Comments)     Vomiting  • Escitalopram Other (See Comments)   • Lisinopril Cough   • Other Itching and Dizziness   • Paroxetine Other (See Comments)           Objective     Blood pressure 137/81, pulse 83, temperature (!) 97 3 °F (36 3 °C), resp   rate 20, height 5' 4\" (1 626 m), weight 75 8 kg " (167 lb 3 2 oz), SpO2 93 %  Body mass index is 28 7 kg/m²  Intake/Output Summary (Last 24 hours) at 4/20/2023 0954  Last data filed at 4/19/2023 2248  Gross per 24 hour   Intake 2800 ml   Output 150 ml   Net 2650 ml         PHYSICAL EXAM:      Physical Exam  Constitutional:       General: She is not in acute distress  Appearance: She is ill-appearing  HENT:      Head: Normocephalic  Eyes:      General: No scleral icterus  Cardiovascular:      Rate and Rhythm: Normal rate  Pulmonary:      Effort: Pulmonary effort is normal    Abdominal:      General: There is no distension  Palpations: Abdomen is soft  Tenderness: There is no abdominal tenderness  There is no guarding  Musculoskeletal:         General: No swelling  Cervical back: Neck supple  Skin:     Coloration: Skin is not jaundiced  Neurological:      Mental Status: She is alert and oriented to person, place, and time  Psychiatric:         Mood and Affect: Mood normal       Comments: Flat affect             Lab Results:   I have reviewed pertinent labs:  Labs in last 72 hours:   Recent Labs     04/19/23  1525 04/20/23  0535   WBC 11 63* 8 09   RBC 4 95 4 25   HGB 14 4 12 4   HCT 44 1 38 3    279   RDW 14 5 14 6   NEUTROABS 9 50*  --    SODIUM 142 141   K 3 3* 3 8    109*   CO2 27 25   BUN 9 8   CREATININE 0 90 0 76   GLUC 117 84   CALCIUM 9 4 8 3*   AST 22  --    ALT 36  --    ALKPHOS 97  --    TP 7 0  --    ALB 4 3  --    TBILI 0 81  --           Imaging Studies: I have personally reviewed pertinent imaging studies

## 2023-04-20 NOTE — ASSESSMENT & PLAN NOTE
· S/p syncopal event falling down wooden steps  · Sustained scalp laceration  · Repaired with staple and Dermabond  · Appreciate wound care consultation  · Pain control with Tylenol  · Monitor neuro status closely x24 hours

## 2023-04-20 NOTE — H&P
[address]  H&P  Name: Tito Hampton 81 y o  female I MRN: [id number]  Unit/Bed#: -01 I Date of Admission: 4/19/2023   Date of Service: 4/20/2023 I Hospital Day: 1      Assessment/Plan   * Syncope  Assessment & Plan  · Presents after syncopal episode, patient does not recall any events and fall down 12 wooden steps sustaining scalp laceration  · Son at bedside reports 3 weeks of diarrhea-patient states history of C  difficile but this is yellow without the foul odor, unlike her previous C  difficile episode  · Suspect hypovolemic with possible orthostatic hypotension causing fall versus cardiac event  · Monitor on telemetry for 24 hours  · IV hydration  · Losartan decreased from 50-25, hydrochlorothiazide on hold  · Scalp laceration repaired by ED-neurochecks x24 hours, CT brain no acute normality    Gastric polyp  Assessment & Plan  · Gastric polyp, incidental finding on CT abdomen pelvis during trauma work-up of 1 8 and 1 3 cm along the lateral wall  · Extensive history of esophagitis  · Continue PTA Protonix  · We will keep n p o  and appreciate GI consultation for possible EGD    Primary hypertension  Assessment & Plan  · PTA maintained on losartan 50 mg daily and hydrochlorothiazide 12 5  · Syncopal episode with suspected orthostatic hypotension we will hold hydrochlorothiazide and decrease losartan to 25 mg pending further evaluation  · Trend blood pressure    Bipolar depression (HCC)  Assessment & Plan  · Follows closely with outpatient psychiatrist  · Continue PTA Lamictal 200 mg twice daily  · Continue PTA lorazepam 1 mg in the a m  and afternoon, 4 mg at at bedtime  · PDMP reviewed, no red flags    Scalp laceration  Assessment & Plan  · S/p syncopal event falling down wooden steps  · Sustained scalp laceration  · Repaired with staple and Dermabond  · Appreciate wound care consultation  · Pain control with Tylenol  · Monitor neuro status closely x24 hours       VTE Pharmacologic Prophylaxis: VTE Score: 5 High Risk (Score >/= 5) - Pharmacological DVT Prophylaxis Ordered: enoxaparin (Lovenox)  Sequential Compression Devices Ordered  Code Status: Level 1 - Full Code   Discussion with family: Updated  (son) at bedside  Anticipated Length of Stay: Patient will be admitted on an inpatient basis with an anticipated length of stay of greater than 2 midnights secondary to Syncope, head injury  Total Time Spent on Date of Encounter in care of patient: 65 minutes This time was spent on one or more of the following: performing physical exam; counseling and coordination of care; obtaining or reviewing history; documenting in the medical record; reviewing/ordering tests, medications or procedures; communicating with other healthcare professionals and discussing with patient's family/caregivers  Chief Complaint: Syncope    History of Present Illness: Amelie Felty is a 81 y o  female with a PMH of bipolar disorder, breast cancer status postlumpectomy, hypertension, esophagitis who lives alone presents with syncopal episode and fall down 12 wooden steps  Presented with scalp laceration  Reports intermittent episodes of dizziness over the past few days, diarrhea over the past 3 weeks but not consistent with previous C  difficile diarrhea this is yellow with seeds  Reports unintentional weight loss but also significant depression  Patient fell down steps and was able to crawl to the phone calling EMS  Review of Systems:  Review of Systems   Constitutional: Positive for fatigue  Negative for chills and fever  HENT: Negative for ear pain and sore throat  Eyes: Negative for pain and visual disturbance  Respiratory: Negative for cough and shortness of breath  Cardiovascular: Negative for chest pain and palpitations  Gastrointestinal: Positive for diarrhea  Negative for abdominal pain and vomiting  Genitourinary: Negative for dysuria and hematuria  Musculoskeletal: Negative for arthralgias and back pain  Skin: Positive for wound  Negative for color change and rash  Neurological: Positive for syncope and weakness  Negative for seizures  Psychiatric/Behavioral: Positive for sleep disturbance  Negative for suicidal ideas  The patient is nervous/anxious  Depression   All other systems reviewed and are negative  Past Medical and Surgical History:   Past Medical History:   Diagnosis Date   • Anxiety    • Breast cancer ([facility] [address] )    • Disease of thyroid gland    • Esophagitis    • Hypertension        Past Surgical History:   Procedure Laterality Date   • BREAST LUMPECTOMY         Meds/Allergies:  Prior to Admission medications    Medication Sig Start Date End Date Taking? Authorizing Provider   atorvastatin (LIPITOR) 40 mg tablet Take 40 mg by mouth daily 12/21/22  Yes Historical Provider, MD   hydrochlorothiazide (MICROZIDE) 12 5 mg capsule TAKE 1 CAPSULE BY MOUTH EVERY MORNING FOR BLOOD PRESSURE *PT CANNOT CUT TABLETS, SEND FULL TABS/CAPS* 3/16/23  Yes Historical Provider, MD   lamoTRIgine (LaMICtal) 200 MG tablet Take 200 mg by mouth 2 (two) times a day   Yes Historical Provider, MD   LORazepam (ATIVAN) 2 mg tablet Take 1 mg by mouth 2 (two) times a day Morning and afternoon   Yes Historical Provider, MD   LORazepam (ATIVAN) 2 mg tablet Take 4 mg by mouth daily at bedtime   Yes Historical Provider, MD   losartan (COZAAR) 25 mg tablet Take 25 mg by mouth daily   Yes Historical Provider, MD   pantoprazole (PROTONIX) 40 mg tablet Take 40 mg by mouth 2 (two) times a day   Yes Historical Provider, MD   Lamotrigine 42 x 25 MG & 7 x 100 MG KIT Take by mouth  4/19/23  Historical Provider, MD   OMEPRAZOLE PO Take by mouth  4/19/23  Historical Provider, MD     I have reviewed home medications with patient personally  Allergies: Allergies   Allergen Reactions   • Vitamin B12 Hives   • Erythromycin Other (See Comments)     Vomiting     • Escitalopram Other "(See Comments)   • Lisinopril Cough   • Other Itching and Dizziness   • Paroxetine Other (See Comments)       Social History:  Marital Status:    Occupation: Retired  Patient Pre-hospital Living Situation: Alone  Patient Pre-hospital Level of Mobility: walks  Patient Pre-hospital Diet Restrictions: None  Substance Use History:   Social History     Substance and Sexual Activity   Alcohol Use Never     Social History     Tobacco Use   Smoking Status Never   Smokeless Tobacco Never     Social History     Substance and Sexual Activity   Drug Use Never       Family History:  History reviewed  No pertinent family history  Physical Exam:     Vitals:   Blood Pressure: 147/77 (04/20/23 0543)  Pulse: 70 (04/20/23 0543)  Temperature: (!) 97 3 °F (36 3 °C) (04/20/23 0543)  Temp Source: Temporal (04/19/23 2216)  Respirations: 20 (04/20/23 0543)  Height: 5' 4\" (162 6 cm) (04/19/23 2044)  Weight - Scale: 75 8 kg (167 lb 3 2 oz) (04/19/23 1452)  SpO2: 97 % (04/20/23 0543)    Physical Exam  Vitals and nursing note reviewed  Constitutional:       General: She is not in acute distress  Appearance: She is well-developed  HENT:      Head: Normocephalic  Comments: Scalp laceration repaired with staple     Mouth/Throat:      Mouth: Mucous membranes are dry  Eyes:      Conjunctiva/sclera: Conjunctivae normal    Cardiovascular:      Rate and Rhythm: Normal rate and regular rhythm  Heart sounds: No murmur heard  Pulmonary:      Effort: Pulmonary effort is normal  No respiratory distress  Breath sounds: Normal breath sounds  Abdominal:      Palpations: Abdomen is soft  Tenderness: There is no abdominal tenderness  Musculoskeletal:         General: Signs of injury present  No swelling  Normal range of motion  Cervical back: Neck supple  Skin:     General: Skin is warm and dry  Capillary Refill: Capillary refill takes less than 2 seconds  Findings: Bruising present     Neurological: " General: No focal deficit present  Mental Status: She is alert and oriented to person, place, and time  Psychiatric:         Mood and Affect: Mood normal          Behavior: Behavior normal           Additional Data:     Lab Results:  Results from last 7 days   Lab Units 04/20/23  0535 04/19/23  1525   WBC Thousand/uL 8 09 11 63*   HEMOGLOBIN g/dL 12 4 14 4   HEMATOCRIT % 38 3 44 1   PLATELETS Thousands/uL 279 331   NEUTROS PCT %  --  82*   LYMPHS PCT %  --  11*   MONOS PCT %  --  6   EOS PCT %  --  0     Results from last 7 days   Lab Units 04/20/23  0535 04/19/23  1525   SODIUM mmol/L 141 142   POTASSIUM mmol/L 3 8 3 3*   CHLORIDE mmol/L 109* 104   CO2 mmol/L 25 27   BUN mg/dL 8 9   CREATININE mg/dL 0 76 0 90   ANION GAP mmol/L 7 11   CALCIUM mg/dL 8 3* 9 4   ALBUMIN g/dL  --  4 3   TOTAL BILIRUBIN mg/dL  --  0 81   ALK PHOS U/L  --  97   ALT U/L  --  36   AST U/L  --  22   GLUCOSE RANDOM mg/dL 84 117     Results from last 7 days   Lab Units 04/19/23  1525   INR  1 06     Results from last 7 days   Lab Units 04/19/23  1539   POC GLUCOSE mg/dl 109               Lines/Drains:  Invasive Devices     Peripheral Intravenous Line  Duration           Peripheral IV 04/19/23 Proximal;Right;Ventral (anterior) Forearm <1 day    Peripheral IV 04/19/23 Right Antecubital <1 day                    Imaging: Reviewed radiology reports from this admission including: chest CT scan, abdominal/pelvic CT and CT head  TRAUMA - CT head wo contrast   Final Result by Dae Fitzgerald MD (04/19 1547)      No acute intracranial abnormality  Workstation performed: [id number]         TRAUMA - CT spine cervical wo contrast   Final Result by Dae Fitzgerald MD (04/19 1547)      No cervical spine fracture or traumatic malalignment                     Workstation performed: [id number]         TRAUMA - CT chest abdomen pelvis w contrast   Final Result by Dae Fitzgerald MD (04/19 1547)      No CT findings of trauma  Polypoid enhancing mass in the gastric body along the lateral wall in series 2 image 111 measuring 1 8 x 1 3 cm  Correlate with GI symptoms and GI consult  Workstation performed: [id number]         XR Trauma chest portable   Final Result by Nora Montez MD (04/19 1526)      No acute cardiopulmonary disease  Workstation performed: [id number]         XR Trauma pelvis ap only 1 or 2 vw   Final Result by Nora Montez MD (04/19 1527)      No acute osseous abnormality  Workstation performed: [id number]             EKG and Other Studies Reviewed on Admission:   · EKG: NSR  HR 63     ** Please Note: This note has been constructed using a voice recognition system   **

## 2023-04-20 NOTE — PHYSICAL THERAPY NOTE
"   PHYSICAL THERAPY EVALUATION  NAME:  Sabina Hunter  DATE: 04/20/23    AGE:   81 y o  Mrn:   [id number]  ADMIT DX:  Syncope [R55]  Gastric mass [K31 89]    Past Medical History:   Diagnosis Date   • Anxiety    • Breast cancer ([facility] [address] )    • Disease of thyroid gland    • Esophagitis    • Hypertension      Length Of Stay: 1  Performed at least 2 patient identifiers during session: Name and Birthday  PHYSICAL THERAPY EVALUATION :        04/20/23 6611   Note Type   Note type Evaluation   Pain Assessment   Pain Assessment Tool 0-10   Pain Score 4   Pain Location/Orientation Location: Head   Restrictions/Precautions   Other Precautions Chair Alarm; Bed Alarm; Fall Risk;Pain   Home Living   Type of Home House  (4 ALLEG[address] BHR)   Home Layout Two level;1/2 bath on main level;Bed/bath upstairs   Bathroom Shower/Tub Walk-in shower   Bathroom Toilet Standard   Bathroom Equipment   (denies owning DME)   Home Equipment Cane;Walker  (SPC PRN)   Additional Comments Pt reports living alone in 2  with 4 ALLEGRA, uses SPC for mobility PRN, frequently stays on first floor and sleeps on couch 2/2 her dog   Prior Function   Level of [place] Independent with ADLs; Independent with functional mobility; Independent with IADLS   Lives With Alone   IADLs Independent with driving   Falls in the last 6 months 1 to 4  (\"3\")   Comments Pt reports completing ADLs, IADLs, functional ambulation and community mobility + driving @ I  Pt reports little support at home   Cognition   Overall Cognitive Status WFL   Orientation Level Oriented X4   Following Commands Follows one step commands without difficulty   RLE Assessment   RLE Assessment WFL  (4/5 strength)   LLE Assessment   LLE Assessment WFL  (4/5 strength)   Vestibular   Vestibular Comments Attempted tracking however pt with limited ability to track, slightly able to toward R  Pt reporting double vision   Bed Mobility   Supine to Sit 4  Minimal assistance   Additional items Assist x 1; Increased time " required;Verbal cues  (trunk management)   Additional Comments HOB flat, no bedrails  Pt completing majority of mobility with supervision however when coming to sit fully upright pt reaching for therapist's hand for support, min A provided  Pt with R lateral LOB due to dizziness with mod A to recover  See below for attempte orthostatics   Transfers   Sit to Stand 4  Minimal assistance   Additional items Assist x 1; Increased time required;Verbal cues   Stand to Sit 4  Minimal assistance   Additional items Assist x 1; Increased time required;Verbal cues   Stand pivot 2  Maximal assistance   Additional items Assist x 1; Increased time required;Verbal cues   Additional Comments RW for transfer, min A for STS for balance, VC for hand placement  SPT with max due to losses of balance  Deferred ambulation due to dizziness and poor balance   Balance   Static Sitting Fair +   Dynamic Sitting Fair   Static Standing Fair -   Dynamic Standing Poor +   Endurance Deficit   Endurance Deficit Yes   Endurance Deficit Description fatigue   Activity Tolerance   Activity Tolerance Patient limited by fatigue   Medical Staff Made Aware OT, [place]   Nurse Made Aware RN, [place]   Assessment   Prognosis Good   Problem List Decreased strength;Decreased endurance; Impaired balance;Decreased mobility; Decreased safety awareness   Barriers to Discharge Decreased caregiver support   Barriers to Discharge Comments Pt lives alone, limited support   Goals   Patient Goals Get rid of dizziness   STG Expiration Date 05/04/23   Plan   Treatment/Interventions Functional transfer training;LE strengthening/ROM; Elevations; Therapeutic exercise; Endurance training;Patient/family training;Equipment eval/education; Bed mobility;Gait training; Compensatory technique education;Spoke to nursing;OT   PT Frequency 3-5x/wk   Recommendation   PT Discharge Recommendation   (HHPT v PAR)   Equipment Recommended   (pt owns RW)   Additional Comments (S)  Anticipate pt return  home with HHPT and possible increased family support if sx improve, if dizziness persists consider STR   AM-PAC Basic Mobility Inpatient   Turning in Flat Bed Without Bedrails 3   Lying on Back to Sitting on Edge of Flat Bed Without Bedrails 3   Moving Bed to Chair 2   Standing Up From Chair Using Arms 3   Walk in Room 2   Climb 3-5 Stairs With Railing 1   Basic Mobility Inpatient Raw Score 14   Basic Mobility Standardized Score 35 55   Highest Level Of Mobility   -[facility] Goal 4: Move to chair/commode   -HL Achieved 4: Move to chair/commode   End of Consult   Patient Position at End of Consult Bedside chair;Bed/Chair alarm activated; All needs within reach      04/20/23 0935 supine  04/20/23 0936 seated EOB - pt symptomatic   Vitals   Pulse 71 83   Blood Pressure 143/78 137/81   MAP (mmHg) 100 100   Oxygen Therapy   SpO2 96 % 93 %       The patient's AM-PAC Basic Mobility Inpatient Short Form Raw Score is 14    A Raw score of less than or equal to 16 suggests the patient may benefit from discharge to post-acute rehabilitation services  Please also refer to the recommendation of the Physical Therapist for safe discharge planning  (Please find full objective findings from PT assessment regarding body systems outlined above)  Assessment: Pt is a 81 y o  female seen for PT evaluation s/p admission to [address] [place] [address] on 4/19/2023 with Syncope  Order placed for PT services  Upon evaluation: Pt is presenting with impaired functional mobility due to decreased strength, decreased endurance, impaired balance, decreased safety awareness, fall risk and dizziness requiring min assistance for bed mobility and max assistance for transfers   Pt's clinical presentation is currently unstable/unpredictable given the functional mobility deficits above coupled with fall risks as indicated by AM-PAC 6-Clicks: 81/05 as well as hx of falls, impaired balance, polypharmacy and decreased safety awareness and combined with medical complications of hypertension  and need for input for mobility technique/safety  Pt's PMHx and comorbidities that may affect physical performance and progress include: HTN and cancer history and/or treatment  Personal factors affecting pt at time of IE include: step(s) to enter environment, multi-level environment, past experience, inability to perform IADLs, inability to perform ADLs, inability to navigate level surfaces without external assistance, inability to ambulate household distances, inability to navigate community distances and recent fall(s)/fall history  Pt will benefit from continued skilled PT services to address deficits as defined above and to maximize level of functional mobility to facilitate return toward PLOF and improved QOL  From PT/mobility standpoint, recommendation at time of d/c would be Home PT vs  post acute rehab (PAR) pending progress in order to reduce fall risk and maximize pt's functional independence and consistency with mobility in order to facilitate return to PLOF  Recommend trial with walker next 1-2 sessions and ther ex next 1-2 sessions  Goals: Pt will: Perform bed mobility tasks with modified I to reposition in bed and prepare for transfers  Pt will perform transfers with modified I to increase Indep in home alone environment and prepare for ambulation  Pt will ambulate with LRAD for >/= 100' with  modified I  to improve gait quality and promote proper use of assistive device and to access home environment  Pt will complete >/= 4 steps with with bilateral handrails with modified I to return to home with ALLEGRA and return to multilevel home  Increase bilateral LE strength 1/2 grade to facilitate independent mobility and Increase all balance 1/2 grade to decrease risk for falls          Karen Colbert, PT,DPT

## 2023-04-20 NOTE — CONSULTS
Consultation - [facility] at [place] [address] 81 y o  female MRN: [id number]  Unit/Bed#: -01 Encounter: [id number]        Consults    Reason for Consult / Principal Problem:     Diarrhea  Unintentional weight loss  Abnormal CT imaging of stomach  Syncope          ASSESSMENT AND PLAN:      Diarrhea  Unintentional weight loss  GERD  Abnormal CT imaging of stomach  Syncope  -Patient states no bowel movements for the past 3 days including during this admission which makes infectious etiology less likely  -Symptoms controlled on PPI continue home dose  -Low suspicion that CT findings of the stomach are responsible for any symptoms however if this was in an active NET could explain diarrhea such as from excessive serotonin or gastrin secretion )  -We will need an eventual EGD +/-EUS for further elevation but does not need to be done on an urgent basis-  -recommend working up syncope and stabilizing patient prior to undergoing anesthesia  -Patient with labile blood pressures during this admission as high as 217/112 as low as 70/44 currently within acceptable parameters but with complaints of dizziness  -Hold off on EGD for now until medical optimization  -Consider starting on clear liquids and if tolerates advance as tolerated to non lactose-containing low fiber diet    ______________________________________________________________________    HPI:  Hope Jacobs is a/an 81 y o  female seen in consultation for diarrhea, unintentional weight loss and abnormal CT imaging of the stomach  Past medical history that includes hypothyroidism, hypertension, breast cancer, anxiety/depression/bipolar  Presents with history of 3 weeks postprandial nonbloody diarrhea up to 3-4 times per day  This has been associated with nausea but no vomiting and unintentional weight loss of about 20 to 25 pounds  He denies any new medications or changes in her medications    She denies excessive NSAID use  Denies any recent antibiotics  States that her home health aide has been sick with a URI recently  Denies fevers  Urinary symptoms  She states she is also been feeling lightheaded and weak during this time  She got out of the bath and had a syncopal event which she claims she had loss of consciousness but no loss of urine or bowel function  Has never happened to her before  She denies any prodromal symptoms such as chest pain, palpitations, nausea, shortness of breath  She does endorse some lightheadedness and weakness  She states that she has not had any bowel movements for the past 3 days nor has she had any since admission  She endorses heartburn but is controlled on her outpatient medications  Denies any dysphagia  Denies early satiety  No previous EGD  Last colonoscopy 4 to 5 years ago with Dr Rodriguez [place] and states that she had some polyps removed and repeat recommended in 5 years  Mild leukocytosis but hemoglobin normal   Mild hypokalemia on admission but renal function intact  Liver enzymes less than 1 5 times upper level normal for women with regards to her ALT  CT: Polypoid enhancing mass in the gastric body along the lateral wall in series 2 image 111 measuring 1 8 x 1 3 cm  Correlate with GI symptoms and GI consult          REVIEW OF SYSTEMS:    Review of Systems   Constitutional: Positive for appetite change and unexpected weight change  HENT: Negative for trouble swallowing  Respiratory: Negative for shortness of breath  Cardiovascular: Negative for chest pain and palpitations  Gastrointestinal: Positive for diarrhea and nausea  Negative for abdominal distention, abdominal pain, blood in stool, constipation and vomiting  Genitourinary: Negative for dysuria  Musculoskeletal: Positive for gait problem  Skin: Negative for color change  Neurological: Positive for dizziness and syncope  Psychiatric/Behavioral: Positive for dysphoric mood  "        Historical Information   Past Medical History:   Diagnosis Date   • Anxiety    • Breast cancer ([facility] [address] )    • Disease of thyroid gland    • Esophagitis    • Hypertension      Past Surgical History:   Procedure Laterality Date   • BREAST LUMPECTOMY       Social History   Social History     Substance and Sexual Activity   Alcohol Use Never     Social History     Substance and Sexual Activity   Drug Use Never     Social History     Tobacco Use   Smoking Status Never   Smokeless Tobacco Never     History reviewed  No pertinent family history  Meds/Allergies     Medications Prior to Admission   Medication   • atorvastatin (LIPITOR) 40 mg tablet   • hydrochlorothiazide (MICROZIDE) 12 5 mg capsule   • lamoTRIgine (LaMICtal) 200 MG tablet   • LORazepam (ATIVAN) 2 mg tablet   • LORazepam (ATIVAN) 2 mg tablet   • losartan (COZAAR) 25 mg tablet   • pantoprazole (PROTONIX) 40 mg tablet     Current Facility-Administered Medications   Medication Dose Route Frequency   • acetaminophen (TYLENOL) tablet 650 mg  650 mg Oral Q6H PRN   • atorvastatin (LIPITOR) tablet 40 mg  40 mg Oral Daily   • enoxaparin (LOVENOX) subcutaneous injection 40 mg  40 mg Subcutaneous Daily   • lamoTRIgine (LaMICtal) tablet 200 mg  200 mg Oral BID   • LORazepam (ATIVAN) tablet 1 mg  1 mg Oral BID   • LORazepam (ATIVAN) tablet 4 mg  4 mg Oral HS   • multi-electrolyte (PLASMALYTE-A/ISOLYTE-S PH 7 4) IV solution  100 mL/hr Intravenous Continuous   • oxyCODONE (ROXICODONE) IR tablet 5 mg  5 mg Oral Q6H PRN   • pantoprazole (PROTONIX) EC tablet 40 mg  40 mg Oral Early Morning       Allergies   Allergen Reactions   • Vitamin B12 Hives   • Erythromycin Other (See Comments)     Vomiting  • Escitalopram Other (See Comments)   • Lisinopril Cough   • Other Itching and Dizziness   • Paroxetine Other (See Comments)           Objective     Blood pressure 137/81, pulse 83, temperature (!) 97 3 °F (36 3 °C), resp   rate 20, height 5' 4\" (1 626 m), weight 75 8 kg " (167 lb 3 2 oz), SpO2 93 %  Body mass index is 28 7 kg/m²  Intake/Output Summary (Last 24 hours) at 4/20/2023 0954  Last data filed at 4/19/2023 2248  Gross per 24 hour   Intake 2800 ml   Output 150 ml   Net 2650 ml         PHYSICAL EXAM:      Physical Exam  Constitutional:       General: She is not in acute distress  Appearance: She is ill-appearing  HENT:      Head: Normocephalic  Eyes:      General: No scleral icterus  Cardiovascular:      Rate and Rhythm: Normal rate  Pulmonary:      Effort: Pulmonary effort is normal    Abdominal:      General: There is no distension  Palpations: Abdomen is soft  Tenderness: There is no abdominal tenderness  There is no guarding  Musculoskeletal:         General: No swelling  Cervical back: Neck supple  Skin:     Coloration: Skin is not jaundiced  Neurological:      Mental Status: She is alert and oriented to person, place, and time  Psychiatric:         Mood and Affect: Mood normal       Comments: Flat affect             Lab Results:   I have reviewed pertinent labs:  Labs in last 72 hours:   Recent Labs     04/19/23  1525 04/20/23  0535   WBC 11 63* 8 09   RBC 4 95 4 25   HGB 14 4 12 4   HCT 44 1 38 3    279   RDW 14 5 14 6   NEUTROABS 9 50*  --    SODIUM 142 141   K 3 3* 3 8    109*   CO2 27 25   BUN 9 8   CREATININE 0 90 0 76   GLUC 117 84   CALCIUM 9 4 8 3*   AST 22  --    ALT 36  --    ALKPHOS 97  --    TP 7 0  --    ALB 4 3  --    TBILI 0 81  --           Imaging Studies: I have personally reviewed pertinent imaging studies

## 2023-04-20 NOTE — OCCUPATIONAL THERAPY NOTE
"    Occupational Therapy Evaluation     Patient Name: Kimberly Randall  [id number] Date: 4/20/2023  Problem List  Principal Problem:    Syncope  Active Problems:    Scalp laceration    Bipolar depression ([facility] )    Primary hypertension    Gastric polyp    Past Medical History  Past Medical History:   Diagnosis Date    Anxiety     Breast cancer ([facility] )     Disease of thyroid gland     Esophagitis     Hypertension      Past Surgical History  Past Surgical History:   Procedure Laterality Date    BREAST LUMPECTOMY             04/20/23 6109   Note Type   Note type Evaluation   Pain Assessment   Pain Assessment Tool 0-10   Pain Score 4   Pain Location/Orientation Location: Head   Restrictions/Precautions   Other Precautions Chair Alarm; Bed Alarm; Fall Risk   Home Living   Type of Home House  (4 ALLEGRA B HR)   Home Layout Two level;1/2 bath on main level;Bed/bath upstairs   Bathroom Shower/Tub Walk-in shower   Bathroom Toilet Standard   Home Equipment Walker;Cane  (uses SPC PRN)   Additional Comments Pt reports living in a 2 story home with 4 ALLEGRA  Pt ambulates with SPC PRN  Pt reprots staying most of the time on the 1st floor d/t \"my dog can't make it up the steps   Prior Function   Level of [place] Independent with ADLs; Independent with functional mobility; Independent with IADLS   Lives With Alone   IADLs Independent with driving   Falls in the last 6 months 1 to 4  (\"3\")   Comments Pt reports completing ADLs, IADLs, functional ambulation and community mobility + driving @ I   ADL   Where Assessed Edge of bed   Grooming Assistance 5  Supervision/Setup   Grooming Deficit Setup   UB Dressing Assistance 5  Supervision/Setup   UB Dressing Deficit Setup   LB Dressing Assistance 3  Moderate Assistance   LB Dressing Deficit Steadying; Requires assistive device for steadying;Verbal cueing;Supervision/safety; Increased time to complete; Don/doff R sock; Don/doff L sock; Thread RLE into pants; Thread LLE into pants;Pull up over hips   Additional " Comments Pt completing ADL tasks while seated at EOB  UB Dressing and grooming tasks @ S after set-up although increased time required 2* to dizziness  LB Dressing @ Mod A d/t dizziness and difficulty with donning socks and Mod A fo rbalnace/safety while completing CM around waist with no UB support  Pt wiht poor balance   Bed Mobility   Supine to Sit 4  Minimal assistance   Additional items Assist x 1; Increased time required;Verbal cues  (trunk management)   Additional Comments HOB flat, no bedrails  Pt with good participation during bed mobility, however once almost upright, Pt with dizziness and LOB to R requiring Mod A to recover  Transfers   Sit to Stand 4  Minimal assistance   Additional items Assist x 1; Increased time required;Verbal cues   Stand to Sit 4  Minimal assistance   Additional items Assist x 1; Increased time required;Verbal cues   Stand pivot 2  Maximal assistance   Additional items Assist x 1; Increased time required;Verbal cues   Additional Comments Pt completing functional transfers with use of RW for UB support  Min A for STS and Max a for SPT d/t dizziness and frequent episodes of LOB requiring Mod-Max A for balance  further assessment was limited d/t significant dizziness   Balance   Static Sitting Fair +   Dynamic Sitting Fair   Static Standing Fair -   Dynamic Standing Poor +   Activity Tolerance   Activity Tolerance Patient limited by fatigue   Medical Staff Made Aware Spoke with PT, Shirley   Nurse Made Aware Spoke with RN, Natasha   RUE Assessment   RUE Assessment WFL   LUE Assessment   LUE Assessment WFL   Hand Function   Gross Motor Coordination Functional   Fine Motor Coordination Functional   Sensation   Light Touch No apparent deficits   Cognition   Overall Cognitive Status WFL   Arousal/Participation Alert; Responsive; Cooperative   Attention Attends with cues to redirect   Orientation Level Oriented X4   Memory Within functional limits   Following Commands Follows one step commands without difficulty   Assessment   Limitation Decreased ADL status; Decreased UE strength;Decreased Safe judgement during ADL;Decreased endurance;Decreased self-care trans;Decreased high-level ADLs   Prognosis Good   Assessment Pt is a 81 y o  female, admitted to [address] [place] [address] 4/19/2023 d/t experiencing a syncopal episode, resulting in a fall down a flight of stairs  Dx: syncope  Pt with PMHx impacting their performance during ADL tasks, including: bipolar disorder, hx breast CA, HTN, anxiety, esophagitis  Prior to admission to the hospital Pt was performing ADLs without physical assistance  IADLs without physical assistance  Functional transfers/ambulation without physical assistance  Cognitive status was PTA was intact  OT order placed to assess Pt's ADLs, cognitive status, and performance during functional tasks in order to maximize safety and independence while making most appropriate d/c recommendations  Pt's clinical presentation is currently unstable/unpredictable given new onset deficits that effect Pt's occupational performance and ability to safely return to [facility] including decrease activity tolerance, decrease standing balance, decrease sitting balance, decrease performance during ADL tasks, decrease safety awareness , decrease UB MS, decrease generalized strength, decrease activity engagement, decrease performance during functional transfers, limited family support, high fall risk and limited insight to deficits combined with medical complications of hypertension , abnormal potassium values, wounds, decreased skin integrity, need for input for mobility technique/safety and acute dizziness   Personal factors affecting Pt at time of initial evaluation include: limited home support, past experience, inability to perform current job functions, inability to perform IADLs, inability to perform ADLs, inability to ambulate household distances, inability to navigate community distances, limited insight into impairments, decreased initiation and engagement, recent fall(s)/fall history and questionable non-compliance  Pt will benefit from continued skilled OT services to address deficits as defined above and to maximize level independence/participation during ADLs and functional tasks to facilitate return toward PLOF and improved quality of life  From an occupational therapy standpoint, recommendation at time of d/c would be HHOT vs post acute rehab pending resolvement of dizziness  Plan   Treatment Interventions ADL retraining;Functional transfer training;UE strengthening/ROM; Endurance training;Patient/family training;Equipment evaluation/education; Neuromuscular reeducation; Compensatory technique education;Continued evaluation; Energy conservation; Activityengagement   Goal Expiration Date 05/04/23   OT Frequency 3-5x/wk   Recommendation   OT Discharge Recommendation   (HHOT vs post acute rehab)   Additional Comments  (S)  limited assess at time of evaluation 2* to severe dizziness  Pending resolvement of dizziness will determine Pt's d/c recommendations  Should symptoms resolve, anticipated HHOT  Will continue to follow   AM-[facility] Daily Activity Inpatient   Lower Body Dressing 2   Bathing 2   Toileting 2   Upper Body Dressing 3   Grooming 3   Eating 3   Daily Activity Raw Score 15   Daily Activity Standardized Score (Calc for Raw Score >=11) 34 69   AM-[facility] Applied Cognition Inpatient   Following a Speech/Presentation 3   Understanding Ordinary Conversation 4   Taking Medications 3   Remembering Where Things Are Placed or Put Away 4   Remembering List of 4-5 Errands 4   Taking Care of Complicated Tasks 3   Applied Cognition Raw Score 21   Applied Cognition Standardized Score 44 3     The patient's raw score on the AM-PAC Daily Activity Inpatient Short Form is 15  A raw score of less than 19 suggests the patient may benefit from discharge to post-acute rehabilitation services   Please refer to the recommendation of the Occupational Therapist for safe discharge planning  Pt goals to be met by 5/4/2023    Pt will demonstrate ability to complete supine<>sit @ Mod I in order to increase safety and independence during ADL tasks  Pt will demonstrate ability to complete LB dressing @ Mod I in order to increase safety and independence during meaningful tasks  Pt will demonstrate ability to alessia/doff socks/shoes while sitting EOB @ Mod I in order to increase safety and independence during meaningful tasks  Pt will demonstrate ability to complete toileting tasks including CM and pericare @ Mod I in order to increase safety and independence during meaningful tasks  Pt will demonstrate ability to complete EOB, chair, toilet/commode transfers @ Mod I in order to increase performance and participation during functional tasks  Pt will demonstrate ability to stand for 7-8 minutes while maintaining G balance with use of least restrictive device for UB support PRN  Pt will demonstrate ability to tolerate 30-35 minute OT session with no vc'ing for deep breathing or use of energy conservation techniques in order to increase activity tolerance during functional tasks  Pt will demonstrate Good carryover of use of energy conservation/compensatory strategies during ADLs and functional tasks in order to increase safety and reduce risk for falls  Pt will demonstrate Good attention and participation in continued evaluation of functional ambulation house hold distances in order to assist with safe d/c planning  Pt will attend to continued cognitive assessments 100% of the time in order to provide most appropriate d/c recommendations  Pt will follow 100% simple 2-step commands and be A&O x4 consistently with environmental cues to increase participation in functional activities     Pt will identify 3 areas of interest/hobbies and 1 intervention on how to incorporate into daily life in order to increase interaction with environment and peers as well as increase participation in meaningful tasks  Pt will demonstrate 100% carryover of BUE HEP in order to increase BUE MS and increase performance during functional tasks upon d/c home      Kwadwo Pardo OTR/L

## 2023-04-20 NOTE — PLAN OF CARE
Problem: Potential for Falls  Goal: Patient will remain free of falls  Description: INTERVENTIONS:  - Educate patient/family on patient safety including physical limitations  - Instruct patient to call for assistance with activity   - Consult OT/PT to assist with strengthening/mobility   - Keep Call bell within reach  - Keep bed low and locked with side rails adjusted as appropriate  - Keep care items and personal belongings within reach  - Initiate and maintain comfort rounds  - Make Fall Risk Sign visible to staff  - Offer Toileting every   Hours, in advance of need  - Initiate/Maintain   alarm  - Obtain necessary fall risk management equipment:      - Apply yellow socks and bracelet for high fall risk patients  - Consider moving patient to room near nurses station  Outcome: Progressing     Problem: PAIN - ADULT  Goal: Verbalizes/displays adequate comfort level or baseline comfort level  Description: Interventions:  - Encourage patient to monitor pain and request assistance  - Assess pain using appropriate pain scale  - Administer analgesics based on type and severity of pain and evaluate response  - Implement non-pharmacological measures as appropriate and evaluate response  - Consider cultural and social influences on pain and pain management  - Notify physician/advanced practitioner if interventions unsuccessful or patient reports new pain  Outcome: Progressing     Problem: INFECTION - ADULT  Goal: Absence or prevention of progression during hospitalization  Description: INTERVENTIONS:  - Assess and monitor for signs and symptoms of infection  - Monitor lab/diagnostic results  - Monitor all insertion sites, i e  indwelling lines, tubes, and drains  - Monitor endotracheal if appropriate and nasal secretions for changes in amount and color  - [place] appropriate cooling/warming therapies per order  - Administer medications as ordered  - Instruct and encourage patient and family to use good hand hygiene technique  - Identify and instruct in appropriate isolation precautions for identified infection/condition  Outcome: Progressing     Problem: SAFETY ADULT  Goal: Patient will remain free of falls  Description: INTERVENTIONS:  - Educate patient/family on patient safety including physical limitations  - Instruct patient to call for assistance with activity   - Consult OT/PT to assist with strengthening/mobility   - Keep Call bell within reach  - Keep bed low and locked with side rails adjusted as appropriate  - Keep care items and personal belongings within reach  - Initiate and maintain comfort rounds  - Make Fall Risk Sign visible to staff  - Offer Toileting every   Hours, in advance of need  - Initiate/Maintain   alarm  - Obtain necessary fall risk management equipment:      - Apply yellow socks and bracelet for high fall risk patients  - Consider moving patient to room near nurses station  Outcome: Progressing  Goal: Maintain or return to baseline ADL function  Description: INTERVENTIONS:  - Educate patient/family on patient safety including physical limitations  - Instruct patient to call for assistance with activity   - Consult OT/PT to assist with strengthening/mobility   - Keep Call bell within reach  - Keep bed low and locked with side rails adjusted as appropriate  - Keep care items and personal belongings within reach  - Initiate and maintain comfort rounds  - Make Fall Risk Sign visible to staff  - Offer Toileting every   Hours, in advance of need  - Initiate/Maintain   alarm  - Obtain necessary fall risk management equipment:      - Apply yellow socks and bracelet for high fall risk patients  - Consider moving patient to room near nurses station  Outcome: Progressing  Goal: Maintains/Returns to pre admission functional level  Description: INTERVENTIONS:  - Perform BMAT or MOVE assessment daily    - Set and communicate daily mobility goal to care team and patient/family/caregiver     - Collaborate with rehabilitation services on mobility goals if consulted  - Perform Range of Motion   times a day  - Reposition patient every   hours  - Dangle patient   times a day  - Stand patient   times a day  - Ambulate patient   times a day  - Out of bed to chair   times a day   - Out of bed for meals   times a day  - Out of bed for toileting  - Record patient progress and toleration of activity level   Outcome: Progressing     Problem: DISCHARGE PLANNING  Goal: Discharge to home or other facility with appropriate resources  Description: INTERVENTIONS:  - Identify barriers to discharge w/patient and caregiver  - Arrange for needed discharge resources and transportation as appropriate  - Identify discharge learning needs (meds, wound care, etc )  - Arrange for interpretive services to assist at discharge as needed  - Refer to Case Management Department for coordinating discharge planning if the patient needs post-hospital services based on physician/advanced practitioner order or complex needs related to functional status, cognitive ability, or social support system  Outcome: Progressing     Problem: Knowledge Deficit  Goal: Patient/family/caregiver demonstrates understanding of disease process, treatment plan, medications, and discharge instructions  Description: Complete learning assessment and assess knowledge base    Interventions:  - Provide teaching at level of understanding  - Provide teaching via preferred learning methods  Outcome: Progressing     Problem: MOBILITY - ADULT  Goal: Maintain or return to baseline ADL function  Description: INTERVENTIONS:  - Educate patient/family on patient safety including physical limitations  - Instruct patient to call for assistance with activity   - Consult OT/PT to assist with strengthening/mobility   - Keep Call bell within reach  - Keep bed low and locked with side rails adjusted as appropriate  - Keep care items and personal belongings within reach  - Initiate and maintain comfort rounds  - Make Fall Risk Sign visible to staff  - Offer Toileting every   Hours, in advance of need  - Initiate/Maintain   alarm  - Obtain necessary fall risk management equipment:      - Apply yellow socks and bracelet for high fall risk patients  - Consider moving patient to room near nurses station  Outcome: Progressing  Goal: Maintains/Returns to pre admission functional level  Description: INTERVENTIONS:  - Perform BMAT or MOVE assessment daily    - Set and communicate daily mobility goal to care team and patient/family/caregiver  - Collaborate with rehabilitation services on mobility goals if consulted  - Perform Range of Motion   times a day  - Reposition patient every   hours  - Dangle patient   times a day  - Stand patient   times a day  - Ambulate patient   times a day  - Out of bed to chair   times a day   - Out of bed for meals     times a day  - Out of bed for toileting  - Record patient progress and toleration of activity level   Outcome: Progressing     Problem: Nutrition/Hydration-ADULT  Goal: Nutrient/Hydration intake appropriate for improving, restoring or maintaining nutritional needs  Description: Monitor and assess patient's nutrition/hydration status for malnutrition  Collaborate with interdisciplinary team and initiate plan and interventions as ordered  Monitor patient's weight and dietary intake as ordered or per policy  Utilize nutrition screening tool and intervene as necessary  Determine patient's food preferences and provide high-protein, high-caloric foods as appropriate       INTERVENTIONS:  - Monitor oral intake, urinary output, labs, and treatment plans  - Assess nutrition and hydration status and recommend course of action  - Evaluate amount of meals eaten  - Assist patient with eating if necessary   - Allow adequate time for meals  - Recommend/ encourage appropriate diets, oral nutritional supplements, and vitamin/mineral supplements  - Order, calculate, and assess calorie counts as needed  - Recommend, monitor, and adjust tube feedings and TPN/PPN based on assessed needs  - Assess need for intravenous fluids  - Provide specific nutrition/hydration education as appropriate  - Include patient/family/caregiver in decisions related to nutrition  Outcome: Progressing

## 2023-04-20 NOTE — ASSESSMENT & PLAN NOTE
Patient is complaining of intermittent episodes of diarrhea for the last 3 weeks  Do a trial of full liquid diet toast crackers and observe for now    Will order stool C  difficile and enteric panel in case she does have diarrhea  Observe Off antibiotics

## 2023-04-20 NOTE — ASSESSMENT & PLAN NOTE
· PTA maintained on losartan 50 mg daily and hydrochlorothiazide 12 5  · Syncopal episode with suspected orthostatic hypotension we will hold hydrochlorothiazide and decrease losartan to 25 mg pending further evaluation  · Trend blood pressure

## 2023-04-20 NOTE — PROGRESS NOTES
[address]  Progress Note  Name: Lynn Vines  MRN: [id number]  Unit/Bed#: -01 I Date of Admission: 4/19/2023   Date of Service: 4/20/2023 I Hospital Day: 1    Assessment/Plan   * Syncope  Assessment & Plan  · Presents after syncopal episode, patient does not recall any events and fell down 12 wooden steps sustaining scalp laceration  · Son at bedside reports 3 weeks of diarrhea-patient states history of C  difficile but this is yellow without the foul odor, unlike her previous C  difficile episode  · Monitor on telemetry for 24 hours  so far no arrythmia noted  · IV hydration now stopped and placed on full liquid toast cracker diet  · Losartan and hydrochlorothiazide on hold  · Scalp laceration repaired by ED-neurochecks x24 hours, CT brain no acute normality  · 4/20: Had episode of hypotension yesterday with blood pressure in the 70s today blood pressure is normal however patient is significantly dizzy, weak with standing and almost passed out again  Also noted with blurry/double vision  We will do MRI brain to rule out posterior circulation stroke  Will place on aspirin 81 mg daily for now  Continue Lipitor 40 mg daily    Diarrhea of presumed infectious origin  Assessment & Plan  Patient is complaining of intermittent episodes of diarrhea for the last 3 weeks  Do a trial of full liquid diet toast crackers and observe for now    Will order stool C  difficile and enteric panel in case she does have diarrhea  Observe Off antibiotics    Scalp laceration  Assessment & Plan  · S/p syncopal event falling down wooden steps  · Sustained scalp laceration  · Repaired with staple and Dermabond  · Appreciate wound care consultation  · Pain control with Tylenol  · Monitor neuro status closely x24 hours    Severe protein-calorie malnutrition (HCC)  Assessment & Plan  Malnutrition Findings:   Adult Malnutrition type: Chronic illness  Adult Degree of Malnutrition: Other severe protein calorie malnutrition  Malnutrition Characteristics: Inadequate energy, Weight loss                  360 Statement: Chronic severe malnutrition related to depression, decreased appetite, recent diarrhea and nausea as evidenced by < 75% estimated energy intake > 1 mo, 13 5% weight loss x 7 mo ( 9/12/22 193lb, 2/9/22 177lb, 4/19/23 167lb)  Treated with: Advance diet as medically appropriate to lactose free, low fiber diet per GI recommendations  Recommend daily weights for nutrition monitoring  Will add ensure plus high PRO BID which is lactose free  BMI Findings: Body mass index is 28 7 kg/m²  Gastric polyp  Assessment & Plan  · Gastric polyp, incidental finding on CT abdomen pelvis during trauma work-up of 1 8 and 1 3 cm along the lateral wall  · Extensive history of esophagitis  · Continue PTA Protonix    Primary hypertension  Assessment & Plan  · PTA maintained on losartan 50 mg daily and hydrochlorothiazide 12 5 at home  · Syncopal episode with suspected orthostatic hypotension we will hold hydrochlorothiazide and losartan pending further evaluation  · Trend blood pressure    Bipolar depression ([facility] [address] )  Assessment & Plan  · Follows closely with outpatient psychiatrist  · Continue PTA Lamictal 200 mg twice daily  · Continue PTA lorazepam 1 mg in the a m  and afternoon, 4 mg at at bedtime  · PDMP reviewed, no red flags         VTE Pharmacologic Prophylaxis:   Pharmacologic: Enoxaparin (Lovenox)  Mechanical VTE Prophylaxis in Place: Yes    Patient Centered Rounds: I have performed bedside rounds with nursing staff today  Discussions with Specialists or Other Care Team Provider: edwardo pt/ot    Education and Discussions with Family / Patient: edwardo patient and son at bedside    Time Spent for Care: 45 minutes  More than 50% of total time spent on counseling and coordination of care as described above  Current Length of Stay: 1 day(s)    Current Patient Status: Inpatient   Certification Statement:  The patient will continue to require additional inpatient hospital stay due to syncope    Discharge Plan: possibly in 1-2 days    Code Status: Level 1 - Full Code      Subjective:   Patient states that she still feels considerably dizzy and unable to stand and walk as she feels extremely dizzy and she feels that she herself is unsteady and also things are spinning around her  Also complaining of double blurry vision  Objective:     Vitals:   Temp (24hrs), Av 7 °F (36 5 °C), Min:97 3 °F (36 3 °C), Max:98 4 °F (36 9 °C)    Temp:  [97 3 °F (36 3 °C)-98 4 °F (36 9 °C)] 97 3 °F (36 3 °C)  HR:  [59-83] 83  Resp:  [11-25] 20  BP: ()/() 137/81  SpO2:  [88 %-98 %] 93 %  Body mass index is 28 7 kg/m²  Input and Output Summary (last 24 hours): Intake/Output Summary (Last 24 hours) at 2023 1246  Last data filed at 2023 1229  Gross per 24 hour   Intake 3040 ml   Output 150 ml   Net 2890 ml       Physical Exam:     Physical Exam  Vitals and nursing note reviewed  Constitutional:       Appearance: Normal appearance  HENT:      Head: Normocephalic and atraumatic  Right Ear: External ear normal       Left Ear: External ear normal       Nose: Nose normal       Mouth/Throat:      Pharynx: Oropharynx is clear  Eyes:      Pupils: Pupils are equal, round, and reactive to light  Cardiovascular:      Rate and Rhythm: Normal rate and regular rhythm  Heart sounds: Normal heart sounds  Pulmonary:      Effort: Pulmonary effort is normal       Breath sounds: Normal breath sounds  Abdominal:      General: Bowel sounds are normal       Palpations: Abdomen is soft  Tenderness: There is no abdominal tenderness  Musculoskeletal:         General: Normal range of motion  Cervical back: Normal range of motion and neck supple  Skin:     General: Skin is warm and dry  Capillary Refill: Capillary refill takes less than 2 seconds     Neurological:      Mental Status: She is alert and oriented to person, place, and time  Cranial Nerves: No cranial nerve deficit  Sensory: No sensory deficit  Coordination: Coordination abnormal       Gait: Gait abnormal       Comments: double vision/blurry vision noted  unAble to stand and walk due to dizziness   Psychiatric:         Mood and Affect: Mood normal            Additional Data:     Labs:    Results from last 7 days   Lab Units 04/20/23  0535 04/19/23  1525   WBC Thousand/uL 8 09 11 63*   HEMOGLOBIN g/dL 12 4 14 4   HEMATOCRIT % 38 3 44 1   PLATELETS Thousands/uL 279 331   NEUTROS PCT %  --  82*   LYMPHS PCT %  --  11*   MONOS PCT %  --  6   EOS PCT %  --  0     Results from last 7 days   Lab Units 04/20/23  0535 04/19/23  1525   SODIUM mmol/L 141 142   POTASSIUM mmol/L 3 8 3 3*   CHLORIDE mmol/L 109* 104   CO2 mmol/L 25 27   BUN mg/dL 8 9   CREATININE mg/dL 0 76 0 90   ANION GAP mmol/L 7 11   CALCIUM mg/dL 8 3* 9 4   ALBUMIN g/dL  --  4 3   TOTAL BILIRUBIN mg/dL  --  0 81   ALK PHOS U/L  --  97   ALT U/L  --  36   AST U/L  --  22   GLUCOSE RANDOM mg/dL 84 117     Results from last 7 days   Lab Units 04/19/23  1525   INR  1 06     Results from last 7 days   Lab Units 04/19/23  1539   POC GLUCOSE mg/dl 109                   * I Have Reviewed All Lab Data Listed Above  * Additional Pertinent Lab Tests Reviewed: [address] 68 Admission Reviewed    Imaging:    Imaging Reports Reviewed Today Include: CT head, CT chest abdomen pelvis    Trauma series reviewed  Imaging Personally Reviewed by Myself Includes: CT head and CT chest abdomen pelvis    Recent Cultures (last 7 days):           Last 24 Hours Medication List:   Current Facility-Administered Medications   Medication Dose Route Frequency Provider Last Rate   • acetaminophen  650 mg Oral Q6H PRN Asya S ASHVIN Barnes     • aspirin  81 mg Oral Daily Bjorn Hilario MD     • atorvastatin  40 mg Oral Daily Asya S ASHVIN Barnes     • enoxaparin  40 mg Subcutaneous Daily Asya S Molly, CRNP     • lamoTRIgine  200 mg Oral BID Asya S Molly, CRNP     • LORazepam  1 mg Oral BID Asya S Molly, CRNP     • LORazepam  4 mg Oral HS Asya S Molly, CRNP     • meclizine  25 mg Oral Q8H PRN Renetta Zarco MD     • oxyCODONE  5 mg Oral Q6H PRN Asya S Molly, CRNP     • pantoprazole  40 mg Oral Early Morning Asya S Molly, CRNP          Today, Patient Was Seen By: Renetta Zarco MD    ** Please Note: Dictation voice to text software may have been used in the creation of this document   **

## 2023-04-20 NOTE — ASSESSMENT & PLAN NOTE
· Presents after syncopal episode, patient does not recall any events and fall down 12 wooden steps sustaining scalp laceration  · Son at bedside reports 3 weeks of diarrhea-patient states history of C  difficile but this is yellow without the foul odor, unlike her previous C  difficile episode  · Suspect hypovolemic with possible orthostatic hypotension causing fall versus cardiac event  · Monitor on telemetry for 24 hours  · IV hydration  · Losartan decreased from 50-25, hydrochlorothiazide on hold  · Scalp laceration repaired by ED-neurochecks x24 hours, CT brain no acute normality

## 2023-04-20 NOTE — ASSESSMENT & PLAN NOTE
· Follows closely with outpatient psychiatrist  · Continue PTA Lamictal 200 mg twice daily  · Continue PTA lorazepam 1 mg in the a m  and afternoon, 4 mg at at bedtime  · PDMP reviewed, no red flags

## 2023-04-20 NOTE — MALNUTRITION/BMI
This medical record reflects one or more clinical indicators suggestive of malnutrition  Malnutrition Findings:   Adult Malnutrition type: Chronic illness  Adult Degree of Malnutrition: Other severe protein calorie malnutrition  Malnutrition Characteristics: Inadequate energy, Weight loss                  360 Statement: Chronic severe malnutrition related to depression, decreased appetite, recent diarrhea and nausea as evidenced by < 75% estimated energy intake > 1 mo, 13 5% weight loss x 7 mo ( 9/12/22 193lb, 2/9/22 177lb, 4/19/23 167lb)  Treated with: Advance diet as medically appropriate to lactose free, low fiber diet per GI recommendations  Recommend daily weights for nutrition monitoring  Will add ensure plus high PRO BID which is lactose free  BMI Findings: Body mass index is 28 7 kg/m²  See Nutrition note dated 4/20/23 for additional details  Completed nutrition assessment is viewable in the nutrition documentation

## 2023-04-20 NOTE — ED PROCEDURE NOTE
PROCEDURE  CriticalCare Time    Date/Time: 4/19/2023 9:12 PM  Performed by: Estephania Florez DO  Authorized by: Estephania Florez DO     Critical care provider statement:     Critical care time (minutes):  40    Critical care was necessary to treat or prevent imminent or life-threatening deterioration of the following conditions:  Circulatory failure, trauma and shock    Critical care was time spent personally by me on the following activities:  Development of treatment plan with patient or surrogate, discussions with primary provider, evaluation of patient's response to treatment, examination of patient, obtaining history from patient or surrogate, re-evaluation of patient's condition, ordering and review of radiographic studies and ordering and review of laboratory studies    I assumed direction of critical care for this patient from another provider in my specialty: yes           Estephania Florez DO  04/19/23 2113

## 2023-04-20 NOTE — ASSESSMENT & PLAN NOTE
· Presents after syncopal episode, patient does not recall any events and fell down 12 wooden steps sustaining scalp laceration  · Son at bedside reports 3 weeks of diarrhea-patient states history of C  difficile but this is yellow without the foul odor, unlike her previous C  difficile episode  · Monitor on telemetry for 24 hours  so far no arrythmia noted  · IV hydration now stopped and placed on full liquid toast cracker diet  · Losartan and hydrochlorothiazide on hold  · Scalp laceration repaired by ED-neurochecks x24 hours, CT brain no acute normality  · 4/20: Had episode of hypotension yesterday with blood pressure in the 70s today blood pressure is normal however patient is significantly dizzy, weak with standing and almost passed out again  Also noted with blurry/double vision  We will do MRI brain to rule out posterior circulation stroke  Will place on aspirin 81 mg daily for now    Continue Lipitor 40 mg daily

## 2023-04-20 NOTE — PLAN OF CARE
Problem: PHYSICAL THERAPY ADULT  Goal: Performs mobility at highest level of function for planned discharge setting  See evaluation for individualized goals  Description: Treatment/Interventions: Functional transfer training, LE strengthening/ROM, Elevations, Therapeutic exercise, Endurance training, Patient/family training, Equipment eval/education, Bed mobility, Gait training, Compensatory technique education, Spoke to nursing, OT  Equipment Recommended:  (pt owns RW)       See flowsheet documentation for full assessment, interventions and recommendations  Note: Prognosis: Good  Problem List: Decreased strength, Decreased endurance, Impaired balance, Decreased mobility, Decreased safety awareness  Assessment: Pt is a 81 y o  female seen for PT evaluation s/p admission to [address] [place] [address] on 4/19/2023 with Syncope  Order placed for PT services  Upon evaluation: Pt is presenting with impaired functional mobility due to decreased strength, decreased endurance, impaired balance, decreased safety awareness, fall risk and dizziness requiring min assistance for bed mobility and max assistance for transfers  Pt's clinical presentation is currently unstable/unpredictable given the functional mobility deficits above coupled with fall risks as indicated by AM-PAC 6-Clicks: 52/90 as well as hx of falls, impaired balance, polypharmacy and decreased safety awareness and combined with medical complications of hypertension  and need for input for mobility technique/safety  Pt's PMHx and comorbidities that may affect physical performance and progress include: HTN and cancer history and/or treatment   Personal factors affecting pt at time of IE include: step(s) to enter environment, multi-level environment, past experience, inability to perform IADLs, inability to perform ADLs, inability to navigate level surfaces without external assistance, inability to ambulate household distances, inability to navigate community distances and recent fall(s)/fall history  Pt will benefit from continued skilled PT services to address deficits as defined above and to maximize level of functional mobility to facilitate return toward PLOF and improved QOL  From PT/mobility standpoint, recommendation at time of d/c would be Home PT vs  post acute rehab (PAR) pending progress in order to reduce fall risk and maximize pt's functional independence and consistency with mobility in order to facilitate return to PLOF  Recommend trial with walker next 1-2 sessions and ther ex next 1-2 sessions  Barriers to Discharge: Decreased caregiver support  Barriers to Discharge Comments: Pt lives alone, limited support  PT Discharge Recommendation:  (HHPT v PAR)    See flowsheet documentation for full assessment

## 2023-04-20 NOTE — PLAN OF CARE
Problem: OCCUPATIONAL THERAPY ADULT  Goal: Performs self-care activities at highest level of function for planned discharge setting  See evaluation for individualized goals  Description: Treatment Interventions: ADL retraining, Functional transfer training, UE strengthening/ROM, Endurance training, Patient/family training, Equipment evaluation/education, Neuromuscular reeducation, Compensatory technique education, Continued evaluation, Energy conservation, Activityengagement          See flowsheet documentation for full assessment, interventions and recommendations  Note: Limitation: Decreased ADL status, Decreased UE strength, Decreased Safe judgement during ADL, Decreased endurance, Decreased self-care trans, Decreased high-level ADLs  Prognosis: Good  Assessment: Pt is a 81 y o  female, admitted to [address] [place] [address] 4/19/2023 d/t experiencing a syncopal episode, resulting in a fall down a flight of stairs  Dx: syncope  Pt with PMHx impacting their performance during ADL tasks, including: bipolar disorder, hx breast CA, HTN, anxiety, esophagitis  Prior to admission to the hospital Pt was performing ADLs without physical assistance  IADLs without physical assistance  Functional transfers/ambulation without physical assistance  Cognitive status was PTA was intact  OT order placed to assess Pt's ADLs, cognitive status, and performance during functional tasks in order to maximize safety and independence while making most appropriate d/c recommendations   Pt's clinical presentation is currently unstable/unpredictable given new onset deficits that effect Pt's occupational performance and ability to safely return to OF including decrease activity tolerance, decrease standing balance, decrease sitting balance, decrease performance during ADL tasks, decrease safety awareness , decrease UB MS, decrease generalized strength, decrease activity engagement, decrease performance during functional transfers, limited family support, high fall risk and limited insight to deficits combined with medical complications of hypertension , abnormal potassium values, wounds, decreased skin integrity, need for input for mobility technique/safety and acute dizziness  Personal factors affecting Pt at time of initial evaluation include: limited home support, past experience, inability to perform current job functions, inability to perform IADLs, inability to perform ADLs, inability to ambulate household distances, inability to navigate community distances, limited insight into impairments, decreased initiation and engagement, recent fall(s)/fall history and questionable non-compliance  Pt will benefit from continued skilled OT services to address deficits as defined above and to maximize level independence/participation during ADLs and functional tasks to facilitate return toward PLOF and improved quality of life  From an occupational therapy standpoint, recommendation at time of d/c would be HHOT vs post acute rehab pending resolvement of dizziness       OT Discharge Recommendation:  (HHOT vs post acute rehab)

## 2023-04-20 NOTE — ASSESSMENT & PLAN NOTE
Malnutrition Findings:   Adult Malnutrition type: Chronic illness  Adult Degree of Malnutrition: Other severe protein calorie malnutrition  Malnutrition Characteristics: Inadequate energy, Weight loss                  360 Statement: Chronic severe malnutrition related to depression, decreased appetite, recent diarrhea and nausea as evidenced by < 75% estimated energy intake > 1 mo, 13 5% weight loss x 7 mo ( 9/12/22 193lb, 2/9/22 177lb, 4/19/23 167lb)  Treated with: Advance diet as medically appropriate to lactose free, low fiber diet per GI recommendations  Recommend daily weights for nutrition monitoring  Will add ensure plus high PRO BID which is lactose free  BMI Findings: Body mass index is 28 7 kg/m²

## 2023-04-21 ENCOUNTER — APPOINTMENT (INPATIENT)
Dept: GASTROENTEROLOGY | Facility: HOSPITAL | Age: 73
End: 2023-04-21
Attending: INTERNAL MEDICINE

## 2023-04-21 PROCEDURE — 0DB68ZZ EXCISION OF STOMACH, VIA NATURAL OR ARTIFICIAL OPENING ENDOSCOPIC: ICD-10-PCS | Performed by: INTERNAL MEDICINE

## 2023-04-21 RX ORDER — SODIUM CHLORIDE, SODIUM GLUCONATE, SODIUM ACETATE, POTASSIUM CHLORIDE, MAGNESIUM CHLORIDE, SODIUM PHOSPHATE, DIBASIC, AND POTASSIUM PHOSPHATE .53; .5; .37; .037; .03; .012; .00082 G/100ML; G/100ML; G/100ML; G/100ML; G/100ML; G/100ML; G/100ML
100 INJECTION, SOLUTION INTRAVENOUS CONTINUOUS
Status: DISCONTINUED | OUTPATIENT
Start: 2023-04-21 | End: 2023-04-22

## 2023-04-21 RX ORDER — SODIUM CHLORIDE, SODIUM LACTATE, POTASSIUM CHLORIDE, CALCIUM CHLORIDE 600; 310; 30; 20 MG/100ML; MG/100ML; MG/100ML; MG/100ML
50 INJECTION, SOLUTION INTRAVENOUS CONTINUOUS
Status: DISCONTINUED | OUTPATIENT
Start: 2023-04-21 | End: 2023-04-21

## 2023-04-21 RX ADMIN — LORAZEPAM 4 MG: 1 TABLET ORAL at 21:12

## 2023-04-21 RX ADMIN — SODIUM CHLORIDE, SODIUM GLUCONATE, SODIUM ACETATE, POTASSIUM CHLORIDE, MAGNESIUM CHLORIDE, SODIUM PHOSPHATE, DIBASIC, AND POTASSIUM PHOSPHATE 100 ML/HR: .53; .5; .37; .037; .03; .012; .00082 INJECTION, SOLUTION INTRAVENOUS at 21:13

## 2023-04-21 RX ADMIN — LORAZEPAM 1 MG: 1 TABLET ORAL at 16:51

## 2023-04-21 RX ADMIN — ENOXAPARIN SODIUM 40 MG: 40 INJECTION SUBCUTANEOUS at 11:57

## 2023-04-21 RX ADMIN — MECLIZINE HYDROCHLORIDE 25 MG: 25 TABLET ORAL at 01:43

## 2023-04-21 RX ADMIN — LAMOTRIGINE 200 MG: 100 TABLET ORAL at 11:58

## 2023-04-21 RX ADMIN — SODIUM CHLORIDE, SODIUM GLUCONATE, SODIUM ACETATE, POTASSIUM CHLORIDE, MAGNESIUM CHLORIDE, SODIUM PHOSPHATE, DIBASIC, AND POTASSIUM PHOSPHATE 100 ML/HR: .53; .5; .37; .037; .03; .012; .00082 INJECTION, SOLUTION INTRAVENOUS at 08:44

## 2023-04-21 RX ADMIN — ACETAMINOPHEN 650 MG: 325 TABLET ORAL at 19:23

## 2023-04-21 RX ADMIN — PANTOPRAZOLE SODIUM 40 MG: 40 TABLET, DELAYED RELEASE ORAL at 05:25

## 2023-04-21 RX ADMIN — LORAZEPAM 1 MG: 1 TABLET ORAL at 11:57

## 2023-04-21 RX ADMIN — ATORVASTATIN CALCIUM 40 MG: 40 TABLET, FILM COATED ORAL at 11:57

## 2023-04-21 RX ADMIN — ASPIRIN 81 MG: 81 TABLET, COATED ORAL at 11:57

## 2023-04-21 RX ADMIN — LAMOTRIGINE 200 MG: 100 TABLET ORAL at 16:51

## 2023-04-21 NOTE — ASSESSMENT & PLAN NOTE
· Patient is complaining of intermittent episodes of diarrhea for the last 3 weeks      · Initial trial of full liquid diet toast crackers however now NPO for possible EGD  · C  difficile and enteric panel ordered however patient has had no further diarrhea  · Observe Off antibiotics

## 2023-04-21 NOTE — PROGRESS NOTES
[address]  Progress Note  Name: Torrey Beard  MRN: [id number]  Unit/Bed#: -01 I Date of Admission: 4/19/2023   Date of Service: 4/21/2023 I Hospital Day: 2    Assessment/Plan   * Syncope  Assessment & Plan  · Presents after syncopal episode, patient does not recall any events and fell down 12 wooden steps sustaining scalp laceration  · Son at bedside reports 3 weeks of diarrhea-patient states history of C  difficile but this is yellow without the foul odor, unlike her previous C  difficile episode  · Monitored on telemetry for 24 hours  No arrythmia noted by previous provider telemetry has since been discontinued  · Orthostatic BP appear negative  · IV hydration resumed for EGD which will continue  Dizziness persist will ask neurology to see patient  · Losartan and hydrochlorothiazide on hold  · Scalp laceration repaired by ED-neurochecks x24 hours, CT brain no acute normality  · 4/20: Had episode of hypotension yesterday with blood pressure in the 70s today blood pressure is normal however patient is significantly dizzy, weak with standing and almost passed out again  Also noted with blurry/double vision  · Patient symptoms improved dizziness appears mild today; patient able to ambulate with the patient no syncope, nystagmus, and patient denies vision disturbance   · Will resume IVF due to NPO status     · MRI brain obtained: no evidence of acute CVA, hemorrhage or mass   · Continue Lipitor     Gastric polyp  Assessment & Plan  · Gastric polyp, incidental finding on CT abdomen of pelvis during trauma work-up of 1 8 and 1 3 cm along the lateral wall  · Extensive history of esophagitis  · Continue PTA Protonix  · GI consulted recommended  · EGD completed polyps removed and sent for exam  Out patient follow up  · Recommend surgical lite diet       Diarrhea of presumed infectious origin  Assessment & Plan  · Patient is complaining of intermittent episodes of diarrhea for the last 3 weeks  · Initial trial of full liquid diet toast crackers however now NPO for possible EGD  · C  difficile and enteric panel ordered however patient has had no further diarrhea  · Observe Off antibiotics    Severe protein-calorie malnutrition ([facility] [address] )  Assessment & Plan  Malnutrition Findings:   Adult Malnutrition type: Chronic illness  Adult Degree of Malnutrition: Other severe protein calorie malnutrition  Malnutrition Characteristics: Inadequate energy, Weight loss                  360 Statement: Chronic severe malnutrition related to depression, decreased appetite, recent diarrhea and nausea as evidenced by < 75% estimated energy intake > 1 mo, 13 5% weight loss x 7 mo ( 9/12/22 193lb, 2/9/22 177lb, 4/19/23 167lb)  Treated with: Advance diet as medically appropriate to lactose free, low fiber diet per GI recommendations  Recommend daily weights for nutrition monitoring  Will add ensure plus high PRO BID which is lactose free  BMI Findings: Body mass index is 28 7 kg/m²  Primary hypertension  Assessment & Plan  · PTA maintained on losartan 50 mg daily and hydrochlorothiazide 12 5 at home  · Syncopal episode with suspected orthostatic hypotension  Likely hypovolemia in setting of diarrhea at home    · BP normotensive last orthostatic bp negative   · Will resume IVF in setting of NPO status for EGD   · Trend blood pressure    Bipolar depression ([facility] )  Assessment & Plan  · Follows closely with outpatient psychiatrist  · Continue PTA Lamictal 200 mg twice daily  · Continue PTA lorazepam 1 mg in the a m  and afternoon, 4 mg at at bedtime  · PDMP reviewed, no red flags    Scalp laceration  Assessment & Plan  · S/p syncopal event falling down wooden steps  · Sustained scalp laceration  · Repaired with staple and Dermabond  · Appreciate wound care consultation  · Pain control with Tylenol  · Neuro status unchanged for past 24 hours              VTE Pharmacologic Prophylaxis: VTE Score: 5 High Risk (Score >/= 5) - Pharmacological DVT Prophylaxis Ordered: enoxaparin (Lovenox)  Sequential Compression Devices Ordered  Patient Centered Rounds: I performed bedside rounds with nursing staff today  Discussions with Specialists or Other Care Team Provider: GI    Education and Discussions with Family / Patient: Updated  (son) at bedside  Total Time Spent on Date of Encounter in care of patient: 35 minutes This time was spent on one or more of the following: performing physical exam; counseling and coordination of care; obtaining or reviewing history; documenting in the medical record; reviewing/ordering tests, medications or procedures; communicating with other healthcare professionals and discussing with patient's family/caregivers  Current Length of Stay: 2 day(s)  Current Patient Status: Inpatient   Certification Statement: The patient will continue to require additional inpatient hospital stay due to need for EGD   Discharge Plan: Anticipate discharge in 24-48 hrs to pending therapy reevaluation     Code Status: Level 1 - Full Code    Subjective:   Patient reports when first getting up she needed more time to move around given dizziness  Patient reports no dizziness with laying down in bed, upon sitting reports minimal dizziness this morning, patient sat at edge of bed with no worsening of symptoms  Ambulated patient with walker in the room to chair with no worsening of symptoms  Patient reports if she takes her time she has no worsening of her symptoms and no feelings of syncope  Patient denies visual changes or disturbance  Objective:     Vitals:   Temp (24hrs), Av 8 °F (36 6 °C), Min:97 2 °F (36 2 °C), Max:98 2 °F (36 8 °C)    Temp:  [97 2 °F (36 2 °C)-98 2 °F (36 8 °C)] 97 2 °F (36 2 °C)  HR:  [] 72  Resp:  [16-19] 19  BP: ()/(50-95) 134/73  SpO2:  [89 %-95 %] 93 %  Body mass index is 28 67 kg/m²  Input and Output Summary (last 24 hours):      Intake/Output Summary (Last 24 hours) at 4/21/2023 1341  Last data filed at 4/21/2023 1337  Gross per 24 hour   Intake 300 ml   Output 1325 ml   Net -1025 ml       Physical Exam:   Physical Exam  Vitals and nursing note reviewed  Constitutional:       General: She is not in acute distress  Appearance: She is well-developed  HENT:      Head: Normocephalic and atraumatic  Eyes:      Extraocular Movements:      Right eye: No nystagmus  Left eye: No nystagmus  Conjunctiva/sclera: Conjunctivae normal    Cardiovascular:      Rate and Rhythm: Normal rate and regular rhythm  Heart sounds: No murmur heard  Pulmonary:      Effort: Pulmonary effort is normal  No respiratory distress  Breath sounds: Normal breath sounds  Abdominal:      Palpations: Abdomen is soft  Tenderness: There is no abdominal tenderness  Musculoskeletal:         General: No swelling  Cervical back: Neck supple  Skin:     General: Skin is warm and dry  Capillary Refill: Capillary refill takes less than 2 seconds  Neurological:      Mental Status: She is alert  Gait: Gait is intact     Psychiatric:         Mood and Affect: Mood normal           Additional Data:     Labs:  Results from last 7 days   Lab Units 04/20/23  0535 04/19/23  1525   WBC Thousand/uL 8 09 11 63*   HEMOGLOBIN g/dL 12 4 14 4   HEMATOCRIT % 38 3 44 1   PLATELETS Thousands/uL 279 331   NEUTROS PCT %  --  82*   LYMPHS PCT %  --  11*   MONOS PCT %  --  6   EOS PCT %  --  0     Results from last 7 days   Lab Units 04/20/23  0535 04/19/23  1525   SODIUM mmol/L 141 142   POTASSIUM mmol/L 3 8 3 3*   CHLORIDE mmol/L 109* 104   CO2 mmol/L 25 27   BUN mg/dL 8 9   CREATININE mg/dL 0 76 0 90   ANION GAP mmol/L 7 11   CALCIUM mg/dL 8 3* 9 4   ALBUMIN g/dL  --  4 3   TOTAL BILIRUBIN mg/dL  --  0 81   ALK PHOS U/L  --  97   ALT U/L  --  36   AST U/L  --  22   GLUCOSE RANDOM mg/dL 84 117     Results from last 7 days   Lab Units 04/19/23  1525   INR  1 06 Results from last 7 days   Lab Units 04/19/23  1539   POC GLUCOSE mg/dl 109               Lines/Drains:  Invasive Devices     Peripheral Intravenous Line  Duration           Peripheral IV 04/19/23 Proximal;Right;Ventral (anterior) Forearm 1 day    Peripheral IV 04/19/23 Right Antecubital 1 day                      Imaging: Reviewed radiology reports from this admission including: abdominal/pelvic CT, CT head and MRI brain    Recent Cultures (last 7 days):         Last 24 Hours Medication List:   Current Facility-Administered Medications   Medication Dose Route Frequency Provider Last Rate   • acetaminophen  650 mg Oral Q6H PRN Asya S Molly, CRNP     • aspirin  81 mg Oral Daily Freddy Rodríguez MD     • atorvastatin  40 mg Oral Daily Asya S Molly, CRNP     • enoxaparin  40 mg Subcutaneous Daily Asya S Molly, CRNP     • lamoTRIgine  200 mg Oral BID Asya S Molly, CRNP     • LORazepam  0 5 mg Oral Q8H PRN Freddy Rodríguez MD     • LORazepam  1 mg Oral BID Asya S Molly, CRNP     • LORazepam  4 mg Oral HS Asya S Molly, CRNP     • meclizine  25 mg Oral Q8H PRN Freddy Rodríguez MD     • multi-electrolyte  100 mL/hr Intravenous Continuous ASHVIN Crouch Stopped (04/21/23 8797)   • oxyCODONE  5 mg Oral Q6H PRN Asya S Molly, CRNP     • pantoprazole  40 mg Oral Early Morning Asya S Molly, CRNP          Today, Patient Was Seen By: ASHVIN Crouch    **Please Note: This note may have been constructed using a voice recognition system  **

## 2023-04-21 NOTE — PLAN OF CARE
Problem: OCCUPATIONAL THERAPY ADULT  Goal: Performs self-care activities at highest level of function for planned discharge setting  See evaluation for individualized goals  Description: Treatment Interventions: ADL retraining, Functional transfer training, UE strengthening/ROM, Endurance training, Patient/family training, Equipment evaluation/education, Neuromuscular reeducation, Compensatory technique education, Continued evaluation, Energy conservation, Activityengagement          See flowsheet documentation for full assessment, interventions and recommendations  Note: Limitation: Decreased ADL status, Decreased UE strength, Decreased Safe judgement during ADL, Decreased endurance, Decreased self-care trans, Decreased high-level ADLs  Prognosis: Good  Assessment: Pt completed OT tx session #1 focused on ADL performance, functional mobility and continued cognitive assessment  Pt alert and agreeable to participate  PT/OT co-treat completed d/t significant mobility deficits and safety concerns  Pt demonstrated improvements in transfer status and LB ADL performance this session but continues to be limited by decreased functional mobility and safety awareness  Pt currently completing UB ADLs @ S/set-up, LB ADLs @ Min A, and functional mobility with RW @ Min-Max A  Pt participated in the SLUMS Examination to further assess cognition and scored a total of 20/30 which indicates Mild Cognitive Impairment  Pt demonstrating Good participation and Good potential to achieve goals but is currently demonstrating deficits in decrease activity tolerance, decrease standing balance, decrease performance during ADL tasks, decrease cognition, decrease safety awareness , increase impulsiveness, decrease generalized strength, decrease activity engagement, decrease performance during functional transfers, limited family support, frequent falls, high fall risk and limited insight to deficits   Continue to recommend post acute rehabilitation services upon discharge to increase safety and independence in ADL tasks and functional mobility       OT Discharge Recommendation: Post acute rehabilitation services

## 2023-04-21 NOTE — DISCHARGE INSTR - OTHER ORDERS
Skin care plans:  1-Hydraguard to bilateral sacrum, buttock and heels BID and PRN  2-Elevate heels to offload pressure  3-Ehob cushion in chair when out of bed  4-Moisturize skin daily with skin nourishing cream   5-Turn/reposition q2h or when medically stable for pressure re-distribution on skin     6-Cleanse right knee with normal saline apply dermagran then allevyn foam change every 3 days and prn soil or dislodgement  7-Cleanse right scalp laceration with normal saline apply thin layer of Silvasrob Gel then open to air BID

## 2023-04-21 NOTE — H&P
"[facility] 81 [facility]  female MRN: [id number]                  HPI: Art Borjas is a 81y o  year old female who presents for EGD for evaluation of abnormal CT scan of stomach  REVIEW OF SYSTEMS: Per the HPI, and otherwise unremarkable  Historical Information   Past Medical History:   Diagnosis Date   • Anxiety    • Breast cancer ([address] )    • Disease of thyroid gland    • Esophagitis    • Hypertension      Past Surgical History:   Procedure Laterality Date   • BREAST LUMPECTOMY       Social History   Social History     Substance and Sexual Activity   Alcohol Use Never     Social History     Substance and Sexual Activity   Drug Use Never     Social History     Tobacco Use   Smoking Status Never   Smokeless Tobacco Never     History reviewed  No pertinent family history  Meds/Allergies     Medications Prior to Admission   Medication   • atorvastatin (LIPITOR) 40 mg tablet   • hydrochlorothiazide (MICROZIDE) 12 5 mg capsule   • lamoTRIgine (LaMICtal) 200 MG tablet   • LORazepam (ATIVAN) 2 mg tablet   • LORazepam (ATIVAN) 2 mg tablet   • losartan (COZAAR) 25 mg tablet   • pantoprazole (PROTONIX) 40 mg tablet       Allergies   Allergen Reactions   • Vitamin B12 Hives   • Erythromycin Other (See Comments)     Vomiting  • Escitalopram Other (See Comments)   • Lisinopril Cough   • Other Itching and Dizziness   • Paroxetine Other (See Comments)       Objective     Blood pressure (!) 189/95, pulse 81, temperature 98 2 °F (36 8 °C), temperature source Oral, resp  rate 18, height 5' 4\" (1 626 m), weight 75 8 kg (167 lb), SpO2 93 %  PHYSICAL EXAM    Gen: NAD  CV: RRR  CHEST: Clear  ABD: soft, NT/ND  EXT: no edema      ASSESSMENT/PLAN:  This is a 81y o  year old female here for EGD for abnormal CT scan of stomach  Patient is stable and optimized for procedure      PLAN:   Procedure: EGD        "

## 2023-04-21 NOTE — PLAN OF CARE
Problem: PHYSICAL THERAPY ADULT  Goal: Performs mobility at highest level of function for planned discharge setting  See evaluation for individualized goals  Description: Treatment/Interventions: Functional transfer training, LE strengthening/ROM, Elevations, Therapeutic exercise, Endurance training, Patient/family training, Equipment eval/education, Bed mobility, Gait training, Compensatory technique education, Spoke to nursing, OT  Equipment Recommended:  (pt owns RW)       See flowsheet documentation for full assessment, interventions and recommendations  Outcome: Progressing  Note: Prognosis: Guarded  Problem List: Decreased strength, Decreased endurance, Impaired balance, Decreased mobility, Decreased coordination, Decreased cognition, Impaired judgement, Decreased safety awareness  Assessment: Pt seen for PT treatment session this date with interventions consisting of bed mobility tasks, transfer training, gait training, and education provided as needed for safety and direction to improve functional mobility, safety awareness, and activity tolerance  Pt agreeable after max encouragement to PT treatment session upon arrival, pt found resting in bed  At end of session, pt left in bed with bed alarm activated and with all needs in reach  In comparison to previous session, pt with improvements in ambulation distance   Continue to recommend STR and if patient should refuse rehab 24/7 care will be required  at time of d/c in order to maximize pt's functional independence and safety w/ mobility  Pt continues to be functioning below baseline level  PT will continue to see pt while here in order to address the deficits listed above and provide interventions consistent w/ POC in effort to achieve STGs    Barriers to Discharge: Decreased caregiver support  Barriers to Discharge Comments: Pt lives alone, limited support  PT Discharge Recommendation: (S) Post acute rehabilitation services    See flowsheet documentation for full assessment

## 2023-04-21 NOTE — PLAN OF CARE
Problem: Potential for Falls  Goal: Patient will remain free of falls  Description: INTERVENTIONS:  - Educate patient/family on patient safety including physical limitations  - Instruct patient to call for assistance with activity   - Consult OT/PT to assist with strengthening/mobility   - Keep Call bell within reach  - Keep bed low and locked with side rails adjusted as appropriate  - Keep care items and personal belongings within reach  - Initiate and maintain comfort rounds  - Make Fall Risk Sign visible to staff  - Offer Toileting every 2 Hours, in advance of need  - Initiate/Maintain bed/chair alarm  - Apply yellow socks and bracelet for high fall risk patients  - Consider moving patient to room near nurses station  Outcome: Progressing     Problem: PAIN - ADULT  Goal: Verbalizes/displays adequate comfort level or baseline comfort level  Description: Interventions:  - Encourage patient to monitor pain and request assistance  - Assess pain using appropriate pain scale  - Administer analgesics based on type and severity of pain and evaluate response  - Implement non-pharmacological measures as appropriate and evaluate response  - Consider cultural and social influences on pain and pain management  - Notify physician/advanced practitioner if interventions unsuccessful or patient reports new pain  Outcome: Progressing     Problem: INFECTION - ADULT  Goal: Absence or prevention of progression during hospitalization  Description: INTERVENTIONS:  - Assess and monitor for signs and symptoms of infection  - Monitor lab/diagnostic results  - Monitor all insertion sites, i e  indwelling lines, tubes, and drains  - Monitor endotracheal if appropriate and nasal secretions for changes in amount and color  - Breeding appropriate cooling/warming therapies per order  - Administer medications as ordered  - Instruct and encourage patient and family to use good hand hygiene technique  - Identify and instruct in appropriate isolation precautions for identified infection/condition  Outcome: Progressing     Problem: SAFETY ADULT  Goal: Patient will remain free of falls  Description: INTERVENTIONS:  - Educate patient/family on patient safety including physical limitations  - Instruct patient to call for assistance with activity   - Consult OT/PT to assist with strengthening/mobility   - Keep Call bell within reach  - Keep bed low and locked with side rails adjusted as appropriate  - Keep care items and personal belongings within reach  - Initiate and maintain comfort rounds  - Make Fall Risk Sign visible to staff  - Offer Toileting every 2 Hours, in advance of need  - Initiate/Maintain bed/chair alarm  - Apply yellow socks and bracelet for high fall risk patients  - Consider moving patient to room near nurses station  Outcome: Progressing  Goal: Maintain or return to baseline ADL function  Description: INTERVENTIONS:  - Educate patient/family on patient safety including physical limitations  - Instruct patient to call for assistance with activity   - Consult OT/PT to assist with strengthening/mobility   - Keep Call bell within reach  - Keep bed low and locked with side rails adjusted as appropriate  - Keep care items and personal belongings within reach  - Initiate and maintain comfort rounds  - Make Fall Risk Sign visible to staff  - Offer Toileting every 2 Hours, in advance of need  - Initiate/Maintain bed/chair alarm  - Apply yellow socks and bracelet for high fall risk patients  - Consider moving patient to room near nurses station  Outcome: Progressing  Goal: Maintains/Returns to pre admission functional level  Description: INTERVENTIONS:  - Educate patient/family on patient safety including physical limitations  - Instruct patient to call for assistance with activity   - Consult OT/PT to assist with strengthening/mobility   - Keep Call bell within reach  - Keep bed low and locked with side rails adjusted as appropriate  - Keep care items and personal belongings within reach  - Initiate and maintain comfort rounds  - Make Fall Risk Sign visible to staff  - Offer Toileting every 2 Hours, in advance of need  - Initiate/Maintain bed/chair alarm  - Apply yellow socks and bracelet for high fall risk patients  - Consider moving patient to room near nurses station  Outcome: Progressing     Problem: DISCHARGE PLANNING  Goal: Discharge to home or other facility with appropriate resources  Description: INTERVENTIONS:  - Identify barriers to discharge w/patient and caregiver  - Arrange for needed discharge resources and transportation as appropriate  - Identify discharge learning needs (meds, wound care, etc )  - Arrange for interpretive services to assist at discharge as needed  - Refer to Case Management Department for coordinating discharge planning if the patient needs post-hospital services based on physician/advanced practitioner order or complex needs related to functional status, cognitive ability, or social support system  Outcome: Progressing     Problem: Knowledge Deficit  Goal: Patient/family/caregiver demonstrates understanding of disease process, treatment plan, medications, and discharge instructions  Description: Complete learning assessment and assess knowledge base    Interventions:  - Provide teaching at level of understanding  - Provide teaching via preferred learning methods  Outcome: Progressing     Problem: MOBILITY - ADULT  Goal: Maintain or return to baseline ADL function  Description: INTERVENTIONS:  - Educate patient/family on patient safety including physical limitations  - Instruct patient to call for assistance with activity   - Consult OT/PT to assist with strengthening/mobility   - Keep Call bell within reach  - Keep bed low and locked with side rails adjusted as appropriate  - Keep care items and personal belongings within reach  - Initiate and maintain comfort rounds  - Make Fall Risk Sign visible to staff  - Offer Toileting every 2 Hours, in advance of need  - Initiate/Maintain bed/chair alarm  - Apply yellow socks and bracelet for high fall risk patients  - Consider moving patient to room near nurses station  Outcome: Progressing  Goal: Maintains/Returns to pre admission functional level  Description: INTERVENTIONS:  - Educate patient/family on patient safety including physical limitations  - Instruct patient to call for assistance with activity   - Consult OT/PT to assist with strengthening/mobility   - Keep Call bell within reach  - Keep bed low and locked with side rails adjusted as appropriate  - Keep care items and personal belongings within reach  - Initiate and maintain comfort rounds  - Make Fall Risk Sign visible to staff  - Offer Toileting every 2 Hours, in advance of need  - Initiate/Maintain bed/chair alarm  - Apply yellow socks and bracelet for high fall risk patients  - Consider moving patient to room near nurses station  Outcome: Progressing     Problem: Nutrition/Hydration-ADULT  Goal: Nutrient/Hydration intake appropriate for improving, restoring or maintaining nutritional needs  Description: Monitor and assess patient's nutrition/hydration status for malnutrition  Collaborate with interdisciplinary team and initiate plan and interventions as ordered  Monitor patient's weight and dietary intake as ordered or per policy  Utilize nutrition screening tool and intervene as necessary  Determine patient's food preferences and provide high-protein, high-caloric foods as appropriate       INTERVENTIONS:  - Monitor oral intake, urinary output, labs, and treatment plans  - Assess nutrition and hydration status and recommend course of action  - Evaluate amount of meals eaten  - Assist patient with eating if necessary   - Allow adequate time for meals  - Recommend/ encourage appropriate diets, oral nutritional supplements, and vitamin/mineral supplements  - Order, calculate, and assess calorie counts as needed  - Recommend, monitor, and adjust tube feedings and TPN/PPN based on assessed needs  - Assess need for intravenous fluids  - Provide specific nutrition/hydration education as appropriate  - Include patient/family/caregiver in decisions related to nutrition  Outcome: Progressing

## 2023-04-21 NOTE — INTERVAL H&P NOTE
H&P reviewed  After examining the patient I find no changes in the patients condition since the H&P had been written    Patient is stable for EGD for further evaluation of abnormal CT scan of the stomach    Vitals:    04/21/23 0726   BP: 131/77   Pulse: (!) 107   Resp:    Temp:    SpO2: 92%

## 2023-04-21 NOTE — PLAN OF CARE
Problem: PAIN - ADULT  Goal: Verbalizes/displays adequate comfort level or baseline comfort level  Description: Interventions:  - Encourage patient to monitor pain and request assistance  - Assess pain using appropriate pain scale  - Administer analgesics based on type and severity of pain and evaluate response  - Implement non-pharmacological measures as appropriate and evaluate response  - Consider cultural and social influences on pain and pain management  - Notify physician/advanced practitioner if interventions unsuccessful or patient reports new pain  Outcome: Progressing     Problem: SAFETY ADULT  Goal: Patient will remain free of falls  Description: INTERVENTIONS:  - Educate patient/family on patient safety including physical limitations  - Instruct patient to call for assistance with activity   - Consult OT/PT to assist with strengthening/mobility   - Keep Call bell within reach  - Keep bed low and locked with side rails adjusted as appropriate  - Keep care items and personal belongings within reach  - Initiate and maintain comfort rounds  - Make Fall Risk Sign visible to staff  - Offer Toileting every   Hours, in advance of need  - Initiate/Maintain   alarm  - Obtain necessary fall risk management equipment:      - Apply yellow socks and bracelet for high fall risk patients  - Consider moving patient to room near nurses station  Outcome: Progressing

## 2023-04-21 NOTE — PLAN OF CARE
Problem: Potential for Falls  Goal: Patient will remain free of falls  Description: INTERVENTIONS:  - Educate patient/family on patient safety including physical limitations  - Instruct patient to call for assistance with activity   - Consult OT/PT to assist with strengthening/mobility   - Keep Call bell within reach  - Keep bed low and locked with side rails adjusted as appropriate  - Keep care items and personal belongings within reach  - Initiate and maintain comfort rounds  - Make Fall Risk Sign visible to staff  - Offer Toileting every 2 Hours, in advance of need  - Initiate/Maintain bed/chair alarm  - Apply yellow socks and bracelet for high fall risk patients  - Consider moving patient to room near nurses station  Outcome: Progressing     Problem: PAIN - ADULT  Goal: Verbalizes/displays adequate comfort level or baseline comfort level  Description: Interventions:  - Encourage patient to monitor pain and request assistance  - Assess pain using appropriate pain scale  - Administer analgesics based on type and severity of pain and evaluate response  - Implement non-pharmacological measures as appropriate and evaluate response  - Consider cultural and social influences on pain and pain management  - Notify physician/advanced practitioner if interventions unsuccessful or patient reports new pain  Outcome: Progressing

## 2023-04-21 NOTE — ASSESSMENT & PLAN NOTE
· S/p syncopal event falling down wooden steps  · Sustained scalp laceration  · Repaired with staple and Dermabond  · Appreciate wound care consultation  · Pain control with Tylenol  · Neuro status unchanged for past 24 hours

## 2023-04-21 NOTE — CASE MANAGEMENT
Case Management Assessment & Discharge Planning Note    Patient name Pierre Gitelman  Location /-19 MRN [id number]  : 1950 Date 2023       Current Admission Date: 2023  Current Admission Diagnosis:Syncope   Patient Active Problem List    Diagnosis Date Noted   • Breast cancer [facility])    • Disease of thyroid gland    • Anxiety    • Esophagitis    • Scalp laceration 2023   • Bipolar depression ([facility] [address] ) 2023   • Primary hypertension 2023   • Gastric polyp 2023   • Severe protein-calorie malnutrition ([facility] [address] ) 2023   • Diarrhea of presumed infectious origin 2023   • Syncope 2023      LOS (days): 2  Geometric Mean LOS (GMLOS) (days): 2 10  Days to GMLOS:0 4     OBJECTIVE:    Risk of Unplanned Readmission Score: 12 37         Current admission status: Inpatient  Referral Reason:  (discharge planning)    Preferred Pharmacy:   [facility], [address] [facility] [address] [place] [id number]  Phone: [phone] Fax: [phone]    Primary Care Provider: Govind Bernal MD    Primary Insurance: MEDICARE  Secondary Insurance: Triny Chanel    ASSESSMENT:    CM met with patient at the bedside,baseline information  was obtained  CM discussed the role of CM in helping the patient develop a discharge plan and assist the patient in carry out their plan  Patient lives alone 2 story home  Son stated patient can not stay home alone   Muna Saba down steps in home walking from bathroom to bedroom    [address] Proxies    There are no active Health Care Proxies on file         Advance Directives  Does patient have a [address] [place] [address] Avenue?: Yes  Does patient have Advance Directives?: Yes  Advance Directives: Living will  Primary Contact: Anne More         Readmission [facility]  30 Day Readmission: No    Patient Information  Admitted from[de-identified] Home  Mental Status: Alert  During Assessment patient was accompanied by: Rena Arce  Assessment information provided by[de-identified] Son  Primary Caregiver: Child  Caregiver's Name[de-identified] Lul Morse Relationship to Patient[de-identified] Family Member  Caregiver's Telephone Number[de-identified] see face sheet  Support Systems: Son  What city do you live in?: [facility] entry access options   Select all that apply : Stairs  Number of steps to enter home : 4  Do the steps have railings?: Yes  Type of Current Residence: 2 story home  Upon entering residence, is there a bedroom on the main floor (no further steps)?: No  A bedroom is located on the following floor levels of residence (select all that apply):: 2nd Floor  Upon entering residence, is there a bathroom on the main floor (no further steps)?: Yes  Number of steps to 2nd floor from main floor: One Flight  In the last 12 months, was there a time when you were not able to pay the mortgage or rent on time?: No  In the last 12 months, how many places have you lived?: 1  In the last 12 months, was there a time when you did not have a steady place to sleep or slept in a shelter (including now)?: No  Homeless/housing insecurity resource given?: N/A  Living Arrangements: Lives Alone  Is patient a ?: No    Activities of Daily Living Prior to Admission  Functional Status: Independent  Completes ADLs independently?: Yes  Ambulates independently?: Yes  Does patient use assisted devices?: Yes  Assisted Devices (DME) used: Patrisha Priya  Does patient currently own DME?: Yes  What DME does the patient currently own?: Bridgerha Priya  Does patient have a history of Outpatient Therapy (PT/OT)?: No  Does the patient have a history of Short-Term Rehab?: No  Does patient have a history of HHC?: No  Does patient currently have [address]?: No         Patient Information Continued  Income Source: SSI/SSD  Does patient have prescription coverage?: Yes  Within the past 12 months, you worried that your food would run out before you got the money to buy more : Never true  Within the past 12 months, the food you bought just didn't last and you didn't have money to get more : Never true  Food insecurity resource given?: N/A  Does patient receive dialysis treatments?: No  Does patient have a history of substance abuse?: No  Does patient have a history of Mental Health Diagnosis?: No         Means of Transportation  Means of Transport to Appts[de-identified] Family transport  In the past 12 months, has lack of transportation kept you from medical appointments or from getting medications?: No  In the past 12 months, has lack of transportation kept you from meetings, work, or from getting things needed for daily living?: No  Was application for public transport provided?: N/A        DISCHARGE DETAILS:    Discharge planning discussed with[de-identified] patient son at bedside  Freedom of Choice: Yes     CM contacted family/caregiver?: Yes (patient son Wamatheus Vogtcorrieelidia at bedside)  Were Treatment Team discharge recommendations reviewed with patient/caregiver?: Yes  Did patient/caregiver verbalize understanding of patient care needs?: Yes  Were patient/caregiver advised of the risks associated with not following Treatment Team discharge recommendations?: Yes    Contacts  Patient Contacts: Dwayne Sapp  Relationship to Patient[de-identified] Family  Contact Method:  In Person  Reason/Outcome: Discharge Planning       CM to follow

## 2023-04-21 NOTE — OCCUPATIONAL THERAPY NOTE
Occupational Therapy Cancel Note    Patient Name: Adalgisa Garcia  BPGLP'O Date: 4/21/2023 04/21/23 1051   Note Type   Note Type Treatment   Cancel Reasons Patient off floor/test     Chart reviewed  Attempted to see pt for OT session this AM  Pt off floor receiving endoscopy and unavailable to receive therapy services at this time  Will continue to follow case and address as appropriate and as time allows      LATRICE Wong/KELLEY

## 2023-04-21 NOTE — PHYSICAL THERAPY NOTE
"   PHYSICAL THERAPY TREATMENT NOTE  NAME:  Mary Shannon  DATE: 04/21/23    Length Of Stay: 2  Performed at least 2 patient identifiers during session: Name and ID bracelet    TREATMENT FLOWSHEET:    04/21/23 1406   PT Last Visit   PT Visit Date 04/21/23   Note Type   Note Type Treatment   Pain Assessment   Pain Assessment Tool 0-10   Pain Score No Pain   Restrictions/Precautions   Weight Bearing Precautions Per Order No   Other Precautions Chair Alarm; Bed Alarm; Fall Risk;Cognitive   General   Chart Reviewed Yes   Response to Previous Treatment Patient with no complaints from previous session  Family/Caregiver Present Yes  (son)   Cognition   Overall Cognitive Status Impaired   Arousal/Participation Alert; Responsive   Attention Attends with cues to redirect   Orientation Level Oriented X4;Oriented to person;Oriented to place   Memory Decreased recall of precautions   Following Commands Follows one step commands without difficulty   Comments Pt  is unrealistic about being able to go home with only 7 hours of care per week and son lives in [place] [address] \"I am not going to a rehab unit, I will just crawl around at home  \"   Bed Mobility   Supine to Sit 4  Minimal assistance   Additional items Assist x 1;Bedrails; Increased time required;Verbal cues   Sit to Supine 4  Minimal assistance   Additional items Assist x 1;Bedrails; Increased time required;Verbal cues   Additional Comments Pt  tolerated sitting at EOB x 3 mins with reports of dizziness upon sitting upright with /75  Dizziness disapeared quickly but returned again when patient climbed back into bed from sit to supine   Transfers   Sit to Stand 4  Minimal assistance   Additional items Assist x 1; Increased time required;Verbal cues  (RW)   Stand to Sit 4  Minimal assistance   Additional items Assist x 1; Increased time required;Verbal cues   Stand pivot 2  Maximal assistance  (max Ax1 with RW while turning)   Additional items Assist x " "1;Increased time required;Verbal cues  (RW)   Additional Comments VC's provided for proper hand placement during transitions  Pt  instructed to always stay within TOYA of RW, shift weight off heels to balls of feet, and to keep nose over toes  Upon stance pt  denied dizziness but experiencing posterior LOB due to WB through heels  Ambulation/Elevation   Gait pattern Improper Weight shift;Narrow TOYA; Festenating;Decreased foot clearance;Shuffling; Foward flexed; Excessively slow; Short stride; Ataxia; Inconsistent sue;Poor UE support;Decreased hip extension;Decreased heel strike;Decreased toe off;R Knee Laci;L Knee Laci   Gait Assistance   (steadying assist-max Ax1)   Additional items Assist x 1;Verbal cues; Tactile cues  (with second staff member on standby for safety with chair follow)   Assistive Device Rolling walker   Distance 30ft   Ambulation/Elevation Additional Comments Pt  with very unsafe and unsteady gait with  1 major  LOB requiring max Ax1 to recover  Pt  BLE are \"jello or limp noodle like\" with poor control and poor UB support as well  Pt  initially requiring steadying assist x1 while moving in a straight path  Upon turning pt  stepping outside TOYA of RW and B knees began giving way withL lateral LOB with max Ax1 to recover  Pt  required assistance to manipulate RW as well  Pt  provided with demonstration of proper gait sequence, keeping BLE within TOYA of RW, and proper turning technique which included stopping feet then turning RW a bit, then while keeping RW stable turn feet ect  until facing the desired direction  Following the turn pt required mod Ax1 with RW back to EOB where she was instructed to reach back for the BSR to slowly lower into sit but pt  plopped onto bed and flug self into supine     Balance   Static Sitting Poor +   Dynamic Sitting Poor +   Static Standing Poor   Dynamic Standing Poor -   Ambulatory Zero   Endurance Deficit   Endurance Deficit Yes   Activity Tolerance " Activity Tolerance Patient limited by fatigue   Medical Staff Made Aware OTR aware and present for treatment for safety due to medcical complexity   Nurse Made Aware RN aware   Assessment   Prognosis Guarded   Problem List Decreased strength;Decreased endurance; Impaired balance;Decreased mobility; Decreased coordination;Decreased cognition; Impaired judgement;Decreased safety awareness   Goals   Patient Goals to go home   PT Treatment Day 1   Plan   Treatment/Interventions Functional transfer training;LE strengthening/ROM; Elevations; Therapeutic exercise; Endurance training;Cognitive reorientation;Patient/family training;Equipment eval/education; Bed mobility;Gait training; Compensatory technique education;Spoke to nursing;Spoke to case management;OT;Family   Progress Slow progress, decreased activity tolerance   PT Frequency 3-5x/wk   Recommendation   PT Discharge Recommendation (S)  Post acute rehabilitation services   Additional Comments (S)  If patient refuses to go to rehab 24/7 around the clock care is required  Pt  needs physical assistance with all mobility   AM-PAC Basic Mobility Inpatient   Turning in Flat Bed Without Bedrails 3   Lying on Back to Sitting on Edge of Flat Bed Without Bedrails 3   Moving Bed to Chair 2   Standing Up From Chair Using Arms 3   Walk in Room 2   Climb 3-5 Stairs With Railing 1   Basic Mobility Inpatient Raw Score 14   Basic Mobility Standardized Score 35 55   Highest Level Of Mobility   -[facility] Goal 4: Move to chair/commode   -HL Achieved 7: Walk 25 feet or more       The patient's AM-PAC Basic Mobility Inpatient Short Form Raw Score is 14  A raw score less than 16 suggests the patient may benefit from discharge to post-acute rehabilitation services  Please also refer to the recommendation of the Physical Therapist for safe discharge planning      Pt seen for PT treatment session this date with interventions consisting of bed mobility tasks, transfer training, gait training, and education provided as needed for safety and direction to improve functional mobility, safety awareness, and activity tolerance  Pt agreeable after max encouragement to PT treatment session upon arrival, pt found resting in bed  At end of session, pt left in bed with bed alarm activated and with all needs in reach  In comparison to previous session, pt with improvements in ambulation distance   Continue to recommend STR and if patient should refuse rehab 24/7 care will be required  at time of d/c in order to maximize pt's functional independence and safety w/ mobility  Pt continues to be functioning below baseline level  PT will continue to see pt while here in order to address the deficits listed above and provide interventions consistent w/ POC in effort to achieve STGs      [place], [place]

## 2023-04-21 NOTE — CASE MANAGEMENT
Case Management Discharge Planning Note    Patient name Kash Paige  Location /-56 MRN [id number]  : 1950 Date 2023       Current Admission Date: 2023  Current Admission Diagnosis:Syncope   Patient Active Problem List    Diagnosis Date Noted   • Breast cancer [facility])    • Disease of thyroid gland    • Anxiety    • Esophagitis    • Scalp laceration 2023   • Bipolar depression ([facility] ) 2023   • Primary hypertension 2023   • Gastric polyp 2023   • Severe protein-calorie malnutrition ([facility] ) 2023   • Diarrhea of presumed infectious origin 2023   • Syncope 2023      LOS (days): 2  Geometric Mean LOS (GMLOS) (days): 2 10  Days to GMLOS:0 2     OBJECTIVE:  Risk of Unplanned Readmission Score: 12 37         Current admission status: Inpatient   Preferred Pharmacy:   [address] [place] [address] [address] [place] [id number]  Phone: [phone] Fax: [phone]    Primary Care Provider: Migel Iraheta MD    Primary Insurance: MEDICARE  Secondary Insurance: [address] [place] [address]    DISCHARGE DETAILS:     CM spoke with son at bedside and aware of pt/ot recommendations for rehab post hospital care STR - would need 24/7 care at home  Patient son stated patient is under Dr Debby Dotson care in [address] [place] [address] for mental health  care for years  CM discussed  FOC with patient and caretaker at bedside  Patient and caretaker agreeable with blanket referrals in [place] for local facilities to determine bed availability according to your medical needs and insurance coverage  Patient and caretaker preference is [facility]  Referral placed in aidin  Patient gets aide services 7 hrs per week patient is not sure which agency provides them    Call placed to waiver program and they can not provide info at this time  They want son to call and access patient account      CM to follow

## 2023-04-21 NOTE — ASSESSMENT & PLAN NOTE
· Gastric polyp, incidental finding on CT abdomen of pelvis during trauma work-up of 1 8 and 1 3 cm along the lateral wall  · Extensive history of esophagitis  · Continue PTA Protonix  · GI consulted recommended  · EGD completed polyps removed and sent for exam  Out patient follow up  · Recommend surgical lite diet

## 2023-04-21 NOTE — WOUND OSTOMY CARE
Consult Note - Wound   Bella Gallagher 81 y o  female MRN: [id number]  Unit/Bed#: -01 Encounter: [id number]      History and Present Illness:  81year old female admitted s/p syncope fall down 12 wooden steps  Laceration to right side of head and right knee  PMH Bipolar depression anxiety severe protein calorie malnutrition      Assessment Findings:   Seen in bed, eating lunch  Alert and oriented  Continent of bowel and bladder  Min assist to turn and reposition  1)Bilateral heels buttocks and sacrum dry and intact  2)Right scalp laceration pink wound base with dry bloody drainage on edges  Cleansed with normal saline and Silvasorb Gel applied and open to air  3)Right knee traumatic wound due to fall  100% red oval wound bed, edges intact      No induration, fluctuance, odor, warmth/temperature differences, redness, or purulence noted to the above noted wounds and skin areas assessed  New dressings applied per orders listed below  Patient tolerated well- no s/s of non-verbal pain or discomfort observed during the encounter  Bedside nurse aware of plan of care  See flow sheets for more detailed assessment findings  Wound care will continue to follow  Skin care plans:  1-Hydraguard to bilateral sacrum, buttock and heels BID and PRN  2-Elevate heels to offload pressure  3-Ehob cushion in chair when out of bed  4-Moisturize skin daily with skin nourishing cream   5-Turn/reposition q2h or when medically stable for pressure re-distribution on skin  6-Cleanse right knee with normal saline apply dermagran then allevyn foam change every 3 days and prn soil or dislodgement  7-Cleanse right scalp laceration with normal saline apply thin layer of Silvasrob Gel then open to air BID        Wound 04/21/23 Knee Anterior;Right (Active)   Wound Image   04/21/23 1211   Wound Description Beefy red 04/21/23 1211   Lizzy-wound Assessment Clean;Dry; Intact 04/21/23 1211   Wound Length (cm) 3 cm 04/21/23 1211   Wound Width (cm) 1 5 cm 04/21/23 1211   Wound Depth (cm) 0 1 cm 04/21/23 1211   Wound Surface Area (cm^2) 4 5 cm^2 04/21/23 1211   Wound Volume (cm^3) 0 45 cm^3 04/21/23 1211   Calculated Wound Volume (cm^3) 0 45 cm^3 04/21/23 1211   Drainage Amount None 04/21/23 1211   Non-staged Wound Description Partial thickness 04/21/23 1211   Treatments Cleansed;Site care 04/21/23 1211   Dressing Dermagran gauze; Foam, Silicon (eg  Allevyn, etc) 04/21/23 1211   Dressing Changed New 04/21/23 1211   Patient Tolerance Tolerated well 04/21/23 1211   Dressing Status Clean;Dry; Intact 04/21/23 1211       Wound 04/21/23 Traumatic Laceration Face Lateral;Right;Upper (Active)   Wound Image   04/21/23 1212   Wound Description Dry;Gangrene;Pink 04/21/23 1212   Lizzy-wound Assessment Dry; Intact 04/21/23 1212   Wound Length (cm) 3 cm 04/21/23 1212   Wound Width (cm) 2 cm 04/21/23 1212   Wound Depth (cm) 0 1 cm 04/21/23 1212   Wound Surface Area (cm^2) 6 cm^2 04/21/23 1212   Wound Volume (cm^3) 0 6 cm^3 04/21/23 1212   Calculated Wound Volume (cm^3) 0 6 cm^3 04/21/23 1212   Drainage Amount None 04/21/23 1212   Non-staged Wound Description Partial thickness 04/21/23 1212   Dressing Changed Other (Comment) 04/21/23 1212   Patient Tolerance Tolerated well 04/21/23 1212   Dressing Status Remoistened 04/21/23 1212       Call or tigertext with any questions  Wound Care will continue to follow    Alli DOTY RN

## 2023-04-21 NOTE — OCCUPATIONAL THERAPY NOTE
Occupational Therapy Progress Note     Patient Name: Tito Hampton  Today's Date: 4/21/2023  Problem List  Principal Problem:    Syncope  Active Problems:    Scalp laceration    Bipolar depression ([facility] )    Primary hypertension    Gastric polyp    Severe protein-calorie malnutrition ([facility] )    Diarrhea of presumed infectious origin    Breast cancer ([facility] )    Disease of thyroid gland    Anxiety    Esophagitis     04/21/23 1344   Note Type   Note Type Treatment   Pain Assessment   Pain Assessment Tool 0-10   Pain Score No Pain   Restrictions/Precautions   Other Precautions Chair Alarm; Bed Alarm; Fall Risk;Pain   ADL   LB Dressing Assistance 4  Minimal Assistance   LB Dressing Deficit Setup;Verbal cueing;Supervision/safety; Increased time to complete   LB Dressing Comments Pt able to thread BLE into brief while seated at EOB  Pt impulsively standing up without direction before AD was given with significant posterior lean  Min A to correct lean and to maintain balance during CM  Bed Mobility   Supine to Sit 5  Supervision   Additional items Increased time required   Additional Comments Pt supine in bed at beginning of session  Supine <> sit @ S  Reports of dizziness with positional changes in bed  Vitals intact, see chart below  Pt supine in bed at end of session with bed alarm intact, call bell within reach and all needs met  Transfers   Sit to Stand   (Steadying assist)   Additional items Assist x 1; Increased time required;Verbal cues   Stand to Sit   (Steadying assist)   Additional items Assist x 1; Increased time required;Verbal cues   Stand pivot   (Min-Max A)   Additional items Assist x 1; Increased time required;Verbal cues   Additional Comments STS from EOB with no AD @ Steadying assist before given AD or directions  Pt then given RW for safety  Pt completing functional mobility around room with RW requiring anywhere from Min A-Max A d/t decreased standing balance  See PT report for greater detail regarding gait  Cognition   Overall Cognitive Status Impaired   Arousal/Participation Alert; Responsive; Cooperative   Attention Attends with cues to redirect   Orientation Level Oriented X4   Memory Within functional limits   Following Commands Follows one step commands without difficulty   Comments (S)  Pt tearful at beginning of session, upset about possibility of returning to rehab as she didn't have a good experience there  Pt ultimately agreeable to participate  Son present and providing encouragement throughout  Pt participated in the [address] ([address] [facility]) Examination to further assess cognition and safety regarding return to home  Pt scored a total of 20/30 which indicates Mild Cognitive Impairment  Pt demonstrated deficits in the following categories: attention, immediate recall, delayed recall, numeric calculation and registration, visual spatial functioning, comprehension and executive functioning  Pt also with impulsivity noted throughout session with poor safety awareness  At this time, pt is a significant safety risk to return home alone and would benefit from post acute rehabilitation services  Activity Tolerance   Activity Tolerance Patient limited by fatigue   Medical Staff Made Aware Spoke with PTA Wisam Abbott   Assessment   Assessment Pt completed OT tx session #1 focused on ADL performance, functional mobility and continued cognitive assessment  Pt alert and agreeable to participate  PT/OT co-treat completed d/t significant mobility deficits and safety concerns  Pt demonstrated improvements in transfer status and LB ADL performance this session but continues to be limited by decreased functional mobility and safety awareness  Pt currently completing UB ADLs @ S/set-up, LB ADLs @ Min A, and functional mobility with RW @ Min-Max A  Pt participated in the UMS Examination to further assess cognition and scored a total of 20/30 which indicates Mild Cognitive Impairment   Pt demonstrating Good participation and Good potential to achieve goals but is currently demonstrating deficits in decrease activity tolerance, decrease standing balance, decrease performance during ADL tasks, decrease cognition, decrease safety awareness , increase impulsiveness, decrease generalized strength, decrease activity engagement, decrease performance during functional transfers, limited family support, frequent falls, high fall risk and limited insight to deficits  Continue to recommend post acute rehabilitation services upon discharge to increase safety and independence in ADL tasks and functional mobility  Plan   Treatment Interventions ADL retraining;Functional transfer training;Cognitive reorientation   Goal Expiration Date 05/04/23   OT Treatment Day 1   OT Frequency 3-5x/wk   Recommendation   OT Discharge Recommendation Post acute rehabilitation services   [facility] Daily [facility] Inpatient   Lower Body Dressing 3   Bathing 3   Toileting 3   Upper Body Dressing 3   Grooming 3   Eating 3   Daily Activity Raw Score 18   Daily Activity Standardized Score (Calc for Raw Score >=11) 38 66   [facility] Applied Cognition Inpatient   Following a Speech/Presentation 3   Understanding Ordinary Conversation 4   Taking Medications 3   Remembering Where Things Are Placed or Put Away 4   Remembering List of 4-5 Errands 2   Taking Care of Complicated Tasks 2   Applied Cognition Raw Score 18   Applied Cognition Standardized Score 38 07     The patient's raw score on the AM-PAC Daily Activity Inpatient Short Form is 18  A raw score of less than 19 suggests the patient may benefit from discharge to post-acute rehabilitation services  Please refer to the recommendation of the Occupational Therapist for safe discharge planning  Pt goals to be met by 5/4/2023     1  Pt will demonstrate ability to complete supine<>sit @ Mod I in order to increase safety and independence during ADL tasks    2  Pt will demonstrate ability to complete LB dressing @ Mod I in order to increase safety and independence during meaningful tasks  3  Pt will demonstrate ability to alessia/doff socks/shoes while sitting EOB @ Mod I in order to increase safety and independence during meaningful tasks  4  Pt will demonstrate ability to complete toileting tasks including CM and pericare @ Mod I in order to increase safety and independence during meaningful tasks  5  Pt will demonstrate ability to complete EOB, chair, toilet/commode transfers @ Mod I in order to increase performance and participation during functional tasks  6  Pt will demonstrate ability to stand for 7-8 minutes while maintaining G balance with use of least restrictive device for UB support PRN  7  Pt will demonstrate ability to tolerate 30-35 minute OT session with no vc'ing for deep breathing or use of energy conservation techniques in order to increase activity tolerance during functional tasks  8  Pt will demonstrate Good carryover of use of energy conservation/compensatory strategies during ADLs and functional tasks in order to increase safety and reduce risk for falls  9  Pt will demonstrate Good attention and participation in continued evaluation of functional ambulation house hold distances in order to assist with safe d/c planning  10  Pt will attend to continued cognitive assessments 100% of the time in order to provide most appropriate d/c recommendations  11  Pt will follow 100% simple 2-step commands and be A&O x4 consistently with environmental cues to increase participation in functional activities  12  Pt will identify 3 areas of interest/hobbies and 1 intervention on how to incorporate into daily life in order to increase interaction with environment and peers as well as increase participation in meaningful tasks  13  Pt will demonstrate 100% carryover of BUE HEP in order to increase BUE MS and increase performance during functional tasks upon d/c home      Gregory Phillips OTR/L

## 2023-04-21 NOTE — ASSESSMENT & PLAN NOTE
· PTA maintained on losartan 50 mg daily and hydrochlorothiazide 12 5 at home  · Syncopal episode with suspected orthostatic hypotension  Likely hypovolemia in setting of diarrhea at home    · BP normotensive last orthostatic bp negative   · Will resume IVF in setting of NPO status for EGD   · Trend blood pressure

## 2023-04-22 LAB
ANION GAP SERPL CALCULATED.3IONS-SCNC: 7 MMOL/L (ref 4–13)
BUN SERPL-MCNC: 7 MG/DL (ref 5–25)
CALCIUM SERPL-MCNC: 8.2 MG/DL (ref 8.4–10.2)
CHLORIDE SERPL-SCNC: 109 MMOL/L (ref 96–108)
CO2 SERPL-SCNC: 28 MMOL/L (ref 21–32)
CREAT SERPL-MCNC: 0.71 MG/DL (ref 0.6–1.3)
ERYTHROCYTE [DISTWIDTH] IN BLOOD BY AUTOMATED COUNT: 14.8 % (ref 11.6–15.1)
GFR SERPL CREATININE-BSD FRML MDRD: 86 ML/MIN/1.73SQ M
GLUCOSE SERPL-MCNC: 89 MG/DL (ref 65–140)
HCT VFR BLD AUTO: 34.7 % (ref 34.8–46.1)
HGB BLD-MCNC: 11.3 G/DL (ref 11.5–15.4)
MAGNESIUM SERPL-MCNC: 2.2 MG/DL (ref 1.9–2.7)
MCH RBC QN AUTO: 29.5 PG (ref 26.8–34.3)
MCHC RBC AUTO-ENTMCNC: 32.6 G/DL (ref 31.4–37.4)
MCV RBC AUTO: 91 FL (ref 82–98)
PLATELET # BLD AUTO: 266 THOUSANDS/UL (ref 149–390)
PMV BLD AUTO: 9.3 FL (ref 8.9–12.7)
POTASSIUM SERPL-SCNC: 3.3 MMOL/L (ref 3.5–5.3)
RBC # BLD AUTO: 3.83 MILLION/UL (ref 3.81–5.12)
SODIUM SERPL-SCNC: 144 MMOL/L (ref 135–147)
WBC # BLD AUTO: 5.63 THOUSAND/UL (ref 4.31–10.16)

## 2023-04-22 RX ORDER — MECLIZINE HYDROCHLORIDE 25 MG/1
25 TABLET ORAL EVERY 8 HOURS SCHEDULED
Status: DISCONTINUED | OUTPATIENT
Start: 2023-04-22 | End: 2023-04-27 | Stop reason: HOSPADM

## 2023-04-22 RX ORDER — LORAZEPAM 1 MG/1
3 TABLET ORAL
Status: DISCONTINUED | OUTPATIENT
Start: 2023-04-22 | End: 2023-04-23

## 2023-04-22 RX ORDER — POTASSIUM CHLORIDE 20 MEQ/1
40 TABLET, EXTENDED RELEASE ORAL ONCE
Status: COMPLETED | OUTPATIENT
Start: 2023-04-22 | End: 2023-04-22

## 2023-04-22 RX ADMIN — MECLIZINE HYDROCHLORIDE 25 MG: 25 TABLET ORAL at 14:16

## 2023-04-22 RX ADMIN — PANTOPRAZOLE SODIUM 40 MG: 40 TABLET, DELAYED RELEASE ORAL at 05:08

## 2023-04-22 RX ADMIN — ENOXAPARIN SODIUM 40 MG: 40 INJECTION SUBCUTANEOUS at 08:17

## 2023-04-22 RX ADMIN — LORAZEPAM 1 MG: 1 TABLET ORAL at 14:15

## 2023-04-22 RX ADMIN — LAMOTRIGINE 200 MG: 100 TABLET ORAL at 17:16

## 2023-04-22 RX ADMIN — ATORVASTATIN CALCIUM 40 MG: 40 TABLET, FILM COATED ORAL at 08:17

## 2023-04-22 RX ADMIN — SODIUM CHLORIDE, SODIUM GLUCONATE, SODIUM ACETATE, POTASSIUM CHLORIDE, MAGNESIUM CHLORIDE, SODIUM PHOSPHATE, DIBASIC, AND POTASSIUM PHOSPHATE 100 ML/HR: .53; .5; .37; .037; .03; .012; .00082 INJECTION, SOLUTION INTRAVENOUS at 06:17

## 2023-04-22 RX ADMIN — LORAZEPAM 3 MG: 1 TABLET ORAL at 22:24

## 2023-04-22 RX ADMIN — POTASSIUM CHLORIDE 40 MEQ: 1500 TABLET, EXTENDED RELEASE ORAL at 14:16

## 2023-04-22 RX ADMIN — ASPIRIN 81 MG: 81 TABLET, COATED ORAL at 08:17

## 2023-04-22 RX ADMIN — LAMOTRIGINE 200 MG: 100 TABLET ORAL at 08:17

## 2023-04-22 RX ADMIN — MECLIZINE HYDROCHLORIDE 25 MG: 25 TABLET ORAL at 22:24

## 2023-04-22 RX ADMIN — LORAZEPAM 1 MG: 1 TABLET ORAL at 08:17

## 2023-04-22 NOTE — ASSESSMENT & PLAN NOTE
· Presents after syncopal episode, patient does not recall any events and fell down 12 wooden steps sustaining scalp laceration  · Son at bedside reports 3 weeks of diarrhea-patient states history of C  difficile but this is yellow without the foul odor, unlike her previous C  difficile episode  · Monitored on telemetry for 24 hours   No arrythmia noted by previous provider telemetry has since been discontinued  · Orthostatic BP appear negative  · Losartan and hydrochlorothiazide on hold  · Scalp laceration repaired by ED-neurochecks x24 hours, CT brain no acute normality  · Pt/ot recommend subacute rehab  · MRI brain obtained: no evidence of acute CVA, hemorrhage or mass   · Continue Lipitor

## 2023-04-22 NOTE — PROGRESS NOTES
[address]  Progress Note  Name: Shay Anaya  MRN: [id number]  Unit/Bed#: -01 I Date of Admission: 4/19/2023   Date of Service: 4/22/2023 I Hospital Day: 3    Assessment/Plan   * Syncope  Assessment & Plan  · Presents after syncopal episode, patient does not recall any events and fell down 12 wooden steps sustaining scalp laceration  · Son at bedside reports 3 weeks of diarrhea-patient states history of C  difficile but this is yellow without the foul odor, unlike her previous C  difficile episode  · Monitored on telemetry for 24 hours  No arrythmia noted by previous provider telemetry has since been discontinued  · Orthostatic BP appear negative  · Losartan and hydrochlorothiazide on hold  · Scalp laceration repaired by ED-neurochecks x24 hours, CT brain no acute normality  · Pt/ot recommend subacute rehab  · MRI brain obtained: no evidence of acute CVA, hemorrhage or mass   · Continue Lipitor     Diarrhea of presumed infectious origin  Assessment & Plan  · Patient is complaining of intermittent episodes of diarrhea for the last 3 weeks  · No diarrhea noted to check stool cultures  · egd done which shows multiple gastric polyps which were biopsied    Scalp laceration  Assessment & Plan  · S/p syncopal event falling down wooden steps  · Sustained scalp laceration  · Repaired with staple and Dermabond  · Appreciate wound care consultation  · Pain control with Tylenol  · Neuro status unchanged for past 24 hours     Primary hypertension  Assessment & Plan  · PTA maintained on losartan 50 mg daily and hydrochlorothiazide 12 5 at home  · Syncopal episode with suspected orthostatic hypotension  Likely hypovolemia in setting of diarrhea at home    · BP normotensive last orthostatic bp negative   · Cont to hold losartan and hctz    Bipolar depression ([facility] [address] )  Assessment & Plan  · Follows closely with outpatient psychiatrist  · Continue PTA Lamictal 200 mg twice daily  · Continue PTA lorazepam 1 mg in the a m  and afternoon, 4 mg at at bedtime  · PDMP reviewed, no red flags         VTE Pharmacologic Prophylaxis:   Pharmacologic: Enoxaparin (Lovenox)  Mechanical VTE Prophylaxis in Place: Yes    Patient Centered Rounds: I have performed bedside rounds with nursing staff today  Discussions with Specialists or Other Care Team Provider: edwardo case managment    Education and Discussions with Family / Patient: edwardo patient at bedside and will update son    Time Spent for Care: 30 minutes  More than 50% of total time spent on counseling and coordination of care as described above  Current Length of Stay: 3 day(s)    Current Patient Status: Inpatient   Certification Statement: The patient will continue to require additional inpatient hospital stay due to dizziness    Discharge Plan: plan for rehab    Code Status: Level 1 - Full Code      Subjective:   Patient complains of feeling weak with walking otherwise denies any chest pain ,sob or diarrhea    Objective:     Vitals:   Temp (24hrs), Av 4 °F (36 9 °C), Min:98 1 °F (36 7 °C), Max:98 6 °F (37 °C)    Temp:  [98 1 °F (36 7 °C)-98 6 °F (37 °C)] 98 6 °F (37 °C)  HR:  [76-84] 84  Resp:  [17-19] 19  BP: (142-153)/(79-91) 153/91  SpO2:  [89 %-95 %] 95 %  Body mass index is 28 67 kg/m²  Input and Output Summary (last 24 hours): Intake/Output Summary (Last 24 hours) at 2023 1328  Last data filed at 2023 1132  Gross per 24 hour   Intake 1330 ml   Output 2925 ml   Net -1595 ml       Physical Exam:     Physical Exam  Vitals and nursing note reviewed  Constitutional:       Appearance: Normal appearance  HENT:      Head: Normocephalic and atraumatic  Right Ear: External ear normal       Left Ear: External ear normal       Nose: Nose normal       Mouth/Throat:      Pharynx: Oropharynx is clear  Cardiovascular:      Rate and Rhythm: Normal rate and regular rhythm  Heart sounds: Normal heart sounds     Pulmonary:      Effort: Pulmonary effort is normal       Breath sounds: Normal breath sounds  Abdominal:      General: Bowel sounds are normal       Palpations: Abdomen is soft  Tenderness: There is no abdominal tenderness  Musculoskeletal:         General: Normal range of motion  Cervical back: Normal range of motion and neck supple  Skin:     General: Skin is warm and dry  Capillary Refill: Capillary refill takes less than 2 seconds  Neurological:      General: No focal deficit present  Mental Status: She is alert and oriented to person, place, and time  Psychiatric:         Mood and Affect: Mood normal            Additional Data:     Labs:    Results from last 7 days   Lab Units 04/22/23  0506 04/20/23  0535 04/19/23  1525   WBC Thousand/uL 5 63   < > 11 63*   HEMOGLOBIN g/dL 11 3*   < > 14 4   HEMATOCRIT % 34 7*   < > 44 1   PLATELETS Thousands/uL 266   < > 331   NEUTROS PCT %  --   --  82*   LYMPHS PCT %  --   --  11*   MONOS PCT %  --   --  6   EOS PCT %  --   --  0    < > = values in this interval not displayed  Results from last 7 days   Lab Units 04/22/23  0506 04/20/23  0535 04/19/23  1525   SODIUM mmol/L 144   < > 142   POTASSIUM mmol/L 3 3*   < > 3 3*   CHLORIDE mmol/L 109*   < > 104   CO2 mmol/L 28   < > 27   BUN mg/dL 7   < > 9   CREATININE mg/dL 0 71   < > 0 90   ANION GAP mmol/L 7   < > 11   CALCIUM mg/dL 8 2*   < > 9 4   ALBUMIN g/dL  --   --  4 3   TOTAL BILIRUBIN mg/dL  --   --  0 81   ALK PHOS U/L  --   --  97   ALT U/L  --   --  36   AST U/L  --   --  22   GLUCOSE RANDOM mg/dL 89   < > 117    < > = values in this interval not displayed  Results from last 7 days   Lab Units 04/19/23  1525   INR  1 06     Results from last 7 days   Lab Units 04/19/23  1539   POC GLUCOSE mg/dl 109                   * I Have Reviewed All Lab Data Listed Above  * Additional Pertinent Lab Tests Reviewed:  [address] Admission Reviewed    Imaging:    Imaging Reports Reviewed Today Include: mri brain  Imaging Personally Reviewed by Myself Includes:  none    Recent Cultures (last 7 days):           Last 24 Hours Medication List:   Current Facility-Administered Medications   Medication Dose Route Frequency Provider Last Rate   • acetaminophen  650 mg Oral Q6H PRN Asya S Molly, CRNP     • aspirin  81 mg Oral Daily Edu Gonzalez MD     • atorvastatin  40 mg Oral Daily Asya S Molly, CRNP     • enoxaparin  40 mg Subcutaneous Daily Asya S Molly, CRNP     • lamoTRIgine  200 mg Oral BID Asya S Molly, CRNP     • LORazepam  0 5 mg Oral Q8H PRN Edu Gonzalez MD     • LORazepam  1 mg Oral BID Asya S Molly, CRNP     • LORazepam  4 mg Oral HS Asya S Molly, CRNP     • meclizine  25 mg Oral Q8H [address] Edu Gonzalez MD     • oxyCODONE  5 mg Oral Q6H PRN Asya S Molly, CRNP     • pantoprazole  40 mg Oral Early Morning Asya S Molly, CRNP     • potassium chloride  40 mEq Oral Once Edu Gonzalez MD          Today, Patient Was Seen By: Edu Gonzalez MD    ** Please Note: Dictation voice to text software may have been used in the creation of this document   **

## 2023-04-22 NOTE — ASSESSMENT & PLAN NOTE
· PTA maintained on losartan 50 mg daily and hydrochlorothiazide 12 5 at home  · Syncopal episode with suspected orthostatic hypotension  Likely hypovolemia in setting of diarrhea at home    · BP normotensive last orthostatic bp negative   · Cont to hold losartan and hctz

## 2023-04-22 NOTE — CASE MANAGEMENT
Case Management Discharge Planning Note    Patient name Negrita Laird  Location /-60 MRN [id number]  : 1950 Date 2023       Current Admission Date: 2023  Current Admission Diagnosis:Syncope   Patient Active Problem List    Diagnosis Date Noted   • Breast cancer [facility])    • Disease of thyroid gland    • Anxiety    • Esophagitis    • Scalp laceration 2023   • Bipolar depression ([facility] [address] ) 2023   • Primary hypertension 2023   • Gastric polyp 2023   • [facility] 2023   • Diarrhea of presumed infectious origin 2023   • Syncope 2023      LOS (days): 3  Geometric Mean LOS (GMLOS) (days): 2 10  Days to GMLOS:-0 6     OBJECTIVE:  Risk of Unplanned Readmission Score: 13 77         Current admission status: Inpatient   Preferred Pharmacy:   [address] [place] [address] [address] [place] [id number]  Phone: [phone] Fax: [phone]    Primary Care Provider: Connor Colon MD    Primary Insurance: MEDICARE  Secondary Insurance: [address]:    Referral placed in [address] to [facility], as this was patients/ Son's choice for STR  Await determination for acceptance

## 2023-04-22 NOTE — PLAN OF CARE
Problem: Potential for Falls  Goal: Patient will remain free of falls  Description: INTERVENTIONS:  - Educate patient/family on patient safety including physical limitations  - Instruct patient to call for assistance with activity   - Consult OT/PT to assist with strengthening/mobility   - Keep Call bell within reach  - Keep bed low and locked with side rails adjusted as appropriate  - Keep care items and personal belongings within reach  - Initiate and maintain comfort rounds  - Make Fall Risk Sign visible to staff  - Offer Toileting every 2 Hours, in advance of need  - Initiate/Maintain bed/chair alarm  - Apply yellow socks and bracelet for high fall risk patients  - Consider moving patient to room near nurses station  Outcome: Progressing     Problem: PAIN - ADULT  Goal: Verbalizes/displays adequate comfort level or baseline comfort level  Description: Interventions:  - Encourage patient to monitor pain and request assistance  - Assess pain using appropriate pain scale  - Administer analgesics based on type and severity of pain and evaluate response  - Implement non-pharmacological measures as appropriate and evaluate response  - Consider cultural and social influences on pain and pain management  - Notify physician/advanced practitioner if interventions unsuccessful or patient reports new pain  Outcome: Progressing     Problem: INFECTION - ADULT  Goal: Absence or prevention of progression during hospitalization  Description: INTERVENTIONS:  - Assess and monitor for signs and symptoms of infection  - Monitor lab/diagnostic results  - Monitor all insertion sites, i e  indwelling lines, tubes, and drains  - Monitor endotracheal if appropriate and nasal secretions for changes in amount and color  - [place] appropriate cooling/warming therapies per order  - Administer medications as ordered  - Instruct and encourage patient and family to use good hand hygiene technique  - Identify and instruct in appropriate isolation precautions for identified infection/condition  Outcome: Progressing     Problem: SAFETY ADULT  Goal: Patient will remain free of falls  Description: INTERVENTIONS:  - Educate patient/family on patient safety including physical limitations  - Instruct patient to call for assistance with activity   - Consult OT/PT to assist with strengthening/mobility   - Keep Call bell within reach  - Keep bed low and locked with side rails adjusted as appropriate  - Keep care items and personal belongings within reach  - Initiate and maintain comfort rounds  - Make Fall Risk Sign visible to staff  - Offer Toileting every 2 Hours, in advance of need  - Initiate/Maintain bed/chair alarm  - Apply yellow socks and bracelet for high fall risk patients  - Consider moving patient to room near nurses station  Outcome: Progressing  Goal: Maintain or return to baseline ADL function  Description: INTERVENTIONS:  - Educate patient/family on patient safety including physical limitations  - Instruct patient to call for assistance with activity   - Consult OT/PT to assist with strengthening/mobility   - Keep Call bell within reach  - Keep bed low and locked with side rails adjusted as appropriate  - Keep care items and personal belongings within reach  - Initiate and maintain comfort rounds  - Make Fall Risk Sign visible to staff  - Offer Toileting every 2 Hours, in advance of need  - Initiate/Maintain bed/chair alarm  - Apply yellow socks and bracelet for high fall risk patients  - Consider moving patient to room near nurses station  Outcome: Progressing  Goal: Maintains/Returns to pre admission functional level  Description: INTERVENTIONS:  - Educate patient/family on patient safety including physical limitations  - Instruct patient to call for assistance with activity   - Consult OT/PT to assist with strengthening/mobility   - Keep Call bell within reach  - Keep bed low and locked with side rails adjusted as appropriate  - Keep care items and personal belongings within reach  - Initiate and maintain comfort rounds  - Make Fall Risk Sign visible to staff  - Offer Toileting every 2 Hours, in advance of need  - Initiate/Maintain bed/chair alarm  - Apply yellow socks and bracelet for high fall risk patients  - Consider moving patient to room near nurses station  Outcome: Progressing     Problem: DISCHARGE PLANNING  Goal: Discharge to home or other facility with appropriate resources  Description: INTERVENTIONS:  - Identify barriers to discharge w/patient and caregiver  - Arrange for needed discharge resources and transportation as appropriate  - Identify discharge learning needs (meds, wound care, etc )  - Arrange for interpretive services to assist at discharge as needed  - Refer to Case Management Department for coordinating discharge planning if the patient needs post-hospital services based on physician/advanced practitioner order or complex needs related to functional status, cognitive ability, or social support system  Outcome: Progressing     Problem: Knowledge Deficit  Goal: Patient/family/caregiver demonstrates understanding of disease process, treatment plan, medications, and discharge instructions  Description: Complete learning assessment and assess knowledge base    Interventions:  - Provide teaching at level of understanding  - Provide teaching via preferred learning methods  Outcome: Progressing     Problem: MOBILITY - ADULT  Goal: Maintain or return to baseline ADL function  Description: INTERVENTIONS:  - Educate patient/family on patient safety including physical limitations  - Instruct patient to call for assistance with activity   - Consult OT/PT to assist with strengthening/mobility   - Keep Call bell within reach  - Keep bed low and locked with side rails adjusted as appropriate  - Keep care items and personal belongings within reach  - Initiate and maintain comfort rounds  - Make Fall Risk Sign visible to staff  - Offer Toileting every 2 Hours, in advance of need  - Initiate/Maintain bed/chair alarm  - Apply yellow socks and bracelet for high fall risk patients  - Consider moving patient to room near nurses station  Outcome: Progressing  Goal: Maintains/Returns to pre admission functional level  Description: INTERVENTIONS:  - Educate patient/family on patient safety including physical limitations  - Instruct patient to call for assistance with activity   - Consult OT/PT to assist with strengthening/mobility   - Keep Call bell within reach  - Keep bed low and locked with side rails adjusted as appropriate  - Keep care items and personal belongings within reach  - Initiate and maintain comfort rounds  - Make Fall Risk Sign visible to staff  - Offer Toileting every 2 Hours, in advance of need  - Initiate/Maintain bed/chair alarm  - Apply yellow socks and bracelet for high fall risk patients  - Consider moving patient to room near nurses station  Outcome: Progressing     Problem: Nutrition/Hydration-ADULT  Goal: Nutrient/Hydration intake appropriate for improving, restoring or maintaining nutritional needs  Description: Monitor and assess patient's nutrition/hydration status for malnutrition  Collaborate with interdisciplinary team and initiate plan and interventions as ordered  Monitor patient's weight and dietary intake as ordered or per policy  Utilize nutrition screening tool and intervene as necessary  Determine patient's food preferences and provide high-protein, high-caloric foods as appropriate       INTERVENTIONS:  - Monitor oral intake, urinary output, labs, and treatment plans  - Assess nutrition and hydration status and recommend course of action  - Evaluate amount of meals eaten  - Assist patient with eating if necessary   - Allow adequate time for meals  - Recommend/ encourage appropriate diets, oral nutritional supplements, and vitamin/mineral supplements  - Order, calculate, and assess calorie counts as needed  - Recommend, monitor, and adjust tube feedings and TPN/PPN based on assessed needs  - Assess need for intravenous fluids  - Provide specific nutrition/hydration education as appropriate  - Include patient/family/caregiver in decisions related to nutrition  Outcome: Progressing

## 2023-04-22 NOTE — ASSESSMENT & PLAN NOTE
· Patient is complaining of intermittent episodes of diarrhea for the last 3 weeks      · No diarrhea noted to check stool cultures  · egd done which shows multiple gastric polyps which were biopsied

## 2023-04-23 LAB
ANION GAP SERPL CALCULATED.3IONS-SCNC: 6 MMOL/L (ref 4–13)
BUN SERPL-MCNC: 14 MG/DL (ref 5–25)
CALCIUM SERPL-MCNC: 9.3 MG/DL (ref 8.4–10.2)
CHLORIDE SERPL-SCNC: 111 MMOL/L (ref 96–108)
CO2 SERPL-SCNC: 27 MMOL/L (ref 21–32)
CREAT SERPL-MCNC: 0.79 MG/DL (ref 0.6–1.3)
GFR SERPL CREATININE-BSD FRML MDRD: 76 ML/MIN/1.73SQ M
GLUCOSE SERPL-MCNC: 99 MG/DL (ref 65–140)
POTASSIUM SERPL-SCNC: 3.6 MMOL/L (ref 3.5–5.3)
SODIUM SERPL-SCNC: 144 MMOL/L (ref 135–147)

## 2023-04-23 RX ORDER — LORAZEPAM 1 MG/1
2 TABLET ORAL
Status: DISCONTINUED | OUTPATIENT
Start: 2023-04-23 | End: 2023-04-27 | Stop reason: HOSPADM

## 2023-04-23 RX ADMIN — ASPIRIN 81 MG: 81 TABLET, COATED ORAL at 10:11

## 2023-04-23 RX ADMIN — LAMOTRIGINE 200 MG: 100 TABLET ORAL at 17:52

## 2023-04-23 RX ADMIN — LORAZEPAM 1 MG: 1 TABLET ORAL at 14:29

## 2023-04-23 RX ADMIN — ATORVASTATIN CALCIUM 40 MG: 40 TABLET, FILM COATED ORAL at 10:11

## 2023-04-23 RX ADMIN — MECLIZINE HYDROCHLORIDE 25 MG: 25 TABLET ORAL at 14:30

## 2023-04-23 RX ADMIN — ENOXAPARIN SODIUM 40 MG: 40 INJECTION SUBCUTANEOUS at 10:12

## 2023-04-23 RX ADMIN — LAMOTRIGINE 200 MG: 100 TABLET ORAL at 10:11

## 2023-04-23 RX ADMIN — LORAZEPAM 1 MG: 1 TABLET ORAL at 10:11

## 2023-04-23 RX ADMIN — LORAZEPAM 2 MG: 1 TABLET ORAL at 21:56

## 2023-04-23 RX ADMIN — MECLIZINE HYDROCHLORIDE 25 MG: 25 TABLET ORAL at 05:16

## 2023-04-23 RX ADMIN — MECLIZINE HYDROCHLORIDE 25 MG: 25 TABLET ORAL at 21:56

## 2023-04-23 RX ADMIN — PANTOPRAZOLE SODIUM 40 MG: 40 TABLET, DELAYED RELEASE ORAL at 05:16

## 2023-04-23 NOTE — ASSESSMENT & PLAN NOTE
Malnutrition Findings:   Adult Malnutrition type: Chronic illness  Adult Degree of Malnutrition: Other severe protein calorie malnutrition  Malnutrition Characteristics: Inadequate energy, Weight loss                  360 Statement: Chronic severe malnutrition related to depression, decreased appetite, recent diarrhea and nausea as evidenced by < 75% estimated energy intake > 1 mo, 13 5% weight loss x 7 mo ( 9/12/22 193lb, 2/9/22 177lb, 4/19/23 167lb)  Treated with: Advance diet as medically appropriate to lactose free, low fiber diet per GI recommendations  Recommend daily weights for nutrition monitoring  Will add ensure plus high PRO BID which is lactose free  BMI Findings: Body mass index is 28 67 kg/m²

## 2023-04-23 NOTE — CONSULTS
TeleConsultation - [address] [facility] 81 y o  female MRN: [id number]  Unit/Bed#: -01 Encounter: [id number]        REQUIRED DOCUMENTATION:     1  This service was provided via Telemedicine  2  Provider located at [place]   3  TeleMed provider: Kurt Wilson MD   4  Identify all parties in room with patient during tele consult:  patient  5  Patient was then informed that this was a Telemedicine visit and that the exam was being conducted confidentially over secure lines  My office door was closed  No one else was in the room  Patient acknowledged consent and understanding of privacy and security of the Telemedicine visit, and gave us permission to have the assistant stay in the room in order to assist with the history and to conduct the exam   I informed the patient that I have reviewed their record in Epic and presented the opportunity for them to ask any questions regarding the visit today  The patient agreed to participate  Assessment/Plan     Present on Admission:  • Syncope  • Bipolar depression ([facility] [address] )  • Primary hypertension  • Gastric polyp    Assessment:    Bipolar depression by chart review   ILENE by patient account      Treatment Plan:  Patient does not meet criteria for inpatient psychiatric hospitalization  Patient is a poor historian in regards to past psychiatric medication trials  Because of limited information, unable to recommend any ssri, snri for  Long term use  Patient has been on Ativan for several years  She is currently on daily 4 mg since admissions which has  Been a significant increase  Patient could be titrated down as tolerated with consideration given to 0 25 or 0 5 mg tapers over a number of weeks  Please consider the risk benefit, as patient does not have a long term solution to treatment of anxiety  Would consider an ideal dose at this time to be Ativan 1 mg tid and to reexamine her ability to manage anxiety at that point      Planned Medication Changes:    none    Current Medications:     Current Facility-Administered Medications   Medication Dose Route Frequency Provider Last Rate   • acetaminophen  650 mg Oral Q6H PRN Asya S Molly, CRNP     • aspirin  81 mg Oral Daily Freddy Rodríguez MD     • atorvastatin  40 mg Oral Daily Asya S Molly, CRNP     • enoxaparin  40 mg Subcutaneous Daily Asya S Molly, CRNP     • lamoTRIgine  200 mg Oral BID Asya S Molly, CRNP     • LORazepam  1 mg Oral BID Asya S Molly, CRNP     • LORazepam  2 mg Oral HS Freddy Rodríguez MD     • meclizine  25 mg Oral Q8H [facility] Freddy Rodríguez MD     • oxyCODONE  5 mg Oral Q6H PRN Asya S Molly, CRNP     • pantoprazole  40 mg Oral Early Morning Asya S Molly, CRNP         Risks / Benefits of Treatment:    Risks, benefits, and possible side effects of medications explained to patient and patient verbalizes understanding  Other treatment modalities recommended as indicated:    · psychotherapy      Consults  Physician Requesting Consult: Freddy Rodríguez MD  Principal Problem:Syncope    Reason for Consult: anxiety      History of Present Illness      79-year-old female, domiciled alone, retired, with 1 remote psychiatric hospitalization who presents with anxiety managed by benzodiazepines and increasing anxiety symptoms  Patient has been prone to falls and has aged out of chronic use of benzodiazepines  However, she has failed all SSRIs and some SNRIs  Making Ativan daily medication for long-term use  She reports difficulty sleeping and difficulty with anxiety and depressed mood    She denies AVTOH and active/passive SH I IP     Psychiatric Review Of Systems:    sleep: yes  appetite changes: no  weight changes: yes  energy/anergy: yes  interest/pleasure/anhedonia: no  somatic symptoms: no  anxiety/panic: yes  raymond: no  guilty/hopeless: no  self injurious behavior/risky behavior: no    Historical Information     Past Psychiatric History:     Psychiatric Hospitalizations:   • one admission 6 years ago  dc on Ativan and lamictal, presenlty taking, has community psychiatrist  Outpatient Treatment History:   • OP psych  Suicide Attempts:   • None  History of self-harm:   • None  Violence History:   • no  Past Psychiatric medication trials: several, does not remember    Substance Abuse History:    denies    Family Psychiatric History:      denies    Social History:    , one son who lives in [facility] is a support system  Retired  support tech  No hobbies  Traumatic History:     Abuse: None  Other Traumatic Events: none    Past Medical History:   Diagnosis Date   • Anxiety    • Breast cancer ([facility] [address] )    • Disease of thyroid gland    • Esophagitis    • Hypertension        Medical Review Of Systems:    Review of Systems    Meds/Allergies     all current active meds have been reviewed  Allergies   Allergen Reactions   • Vitamin B12 Hives   • Erythromycin Other (See Comments)     Vomiting     • Escitalopram Other (See Comments)   • Lisinopril Cough   • Other Itching and Dizziness   • Paroxetine Other (See Comments)       Objective     Vital signs in last 24 hours:  Temp:  [97 9 °F (36 6 °C)-98 6 °F (37 °C)] 97 9 °F (36 6 °C)  HR:  [66-92] 92  Resp:  [16-18] 18  BP: (149-167)/(82-95) 157/82      Intake/Output Summary (Last 24 hours) at 4/23/2023 1415  Last data filed at 4/23/2023 1359  Gross per 24 hour   Intake 1518 33 ml   Output 1600 ml   Net -81 67 ml       Mental Status Evaluation:    Appearance:  age appropriate   Behavior:  normal   Speech:  normal pitch and normal volume   Mood:  anxious   Affect:  normal   Language: wnl   Thought Process:  normal   Associations intact associations   Thought Content:  normal   Perceptual Disturbances: None   Risk Potential: Suicidal Ideations none  Homicidal Ideations none  Potential for Aggression No   Sensorium:  person, place, time/date and situation   Cognition:  remote memory mildly impaired   Consciousness:  alert and awake    Attention: attention span and concentration were age appropriate   Intellect: within normal limits   Fund of Knowledge: awareness of current events: not formally examined   Insight:  good   Judgment: good   Muscle Strength:  Muscle Tone: not formally tested none  Not fomall tested   Gait/Station: normal balance   Motor Activity: no abnormal movements       Lab Results: I have personally reviewed all pertinent laboratory/tests results  Labs in last 72 hours:   Recent Labs     04/22/23  0506 04/23/23  0526   WBC 5 63  --    RBC 3 83  --    HGB 11 3*  --    HCT 34 7*  --      --    RDW 14 8  --    SODIUM 144 144   K 3 3* 3 6   * 111*   CO2 28 27   BUN 7 14   CREATININE 0 71 0 79   GLUC 89 99   CALCIUM 8 2* 9 3       Imaging Studies: EGD    Result Date: 4/21/2023  Narrative: Table formatting from the original result was not included  [facility]  [address] [address] [phone] DATE OF SERVICE: 4/21/23 PHYSICIAN(S): Attending: Taty Garcia MD Fellow: No Staff Documented INDICATION: Gastric mass POST-OP DIAGNOSIS: See the impression below  PREPROCEDURE: Informed consent was obtained for the procedure, including sedation  Risks of perforation, hemorrhage, adverse drug reaction and aspiration were discussed  The patient was placed in the left lateral decubitus position  Patient was explained about the risks and benefits of the procedure  Risks including but not limited to bleeding, infection, and perforation were explained in detail  Also explained about less than 100% sensitivity with the exam and other alternatives  PROCEDURE: EGD DETAILS OF PROCEDURE: Patient was taken to the procedure room where a time out was performed to confirm correct patient and correct procedure   The patient underwent monitored anesthesia care, which was administered by an anesthesia professional  The patient's blood pressure, heart rate, level of consciousness, respirations, oxygen, ECG and ETCO2 were monitored throughout the procedure  The scope was introduced through the mouth and advanced to the second part of the duodenum  Retroflexion was performed in the incisura  The patient experienced no blood loss  The procedure was not difficult  The patient tolerated the procedure well  There were no apparent complications  ANESTHESIA INFORMATION: ASA: II Anesthesia Type: IV Sedation with Anesthesia MEDICATIONS: No administrations occurring from 0957 to 1034 on 04/21/23 FINDINGS: The esophagus appeared normal  Ten or more sessile polyps measuring from 2 mm up to 12 mm in the stomach; completely removed en bloc by cold snare and retrieved specimen  Larger polyps removed in total of 15 Performed forceps biopsies to rule out H  pylori in the stomach The duodenal bulb and 2nd part of the duodenum appeared normal  SPECIMENS: ID Type Source Tests Collected by Time Destination 1 : stomach body polyps x15 Tissue Stomach TISSUE EXAM Shanice Browning MD 4/21/2023 1007  2 : biopsy r/o H  Pylori Tissue Stomach TISSUE EXAM Shanice Browning MD 4/21/2023 1017      Impression: Normal-appearing esophagus Multiple gastric polyps largest about 12 to 14 mm in size; 15 removed with cold snare polypectomy Biopsies taken to rule out H  pylori Normal-appearing duodenal bulb and visualized second portion of duodenum RECOMMENDATION:  Await pathology results  Return to floor for ongoing care as per primary team   May resume previously tolerated diet  Continue PPI once daily  Repeat EGD in 6 to 12 months  Follow-up with her primary gastroenterology team    Shanice Browning MD     XR Trauma chest portable    Result Date: 4/19/2023  Narrative: CHEST INDICATION:   TRAUMA  COMPARISON:  Compared with 5/13/2019 EXAM PERFORMED/VIEWS:  XR CHEST PORTABLE FINDINGS: Cardiomediastinal silhouette appears unremarkable  The lungs are clear  No pneumothorax or pleural effusion  Osseous structures appear within normal limits for patient age       Impression: No acute cardiopulmonary disease  Workstation performed: [id number]     TRAUMA - CT head wo contrast    Result Date: 4/19/2023  Narrative: CT BRAIN - WITHOUT CONTRAST INDICATION:   TRAUMA  COMPARISON:  None  TECHNIQUE:  CT examination of the brain was performed  Multiplanar 2D reformatted images were created from the source data  Radiation dose length product (DLP) for this visit:  955 mGy-cm   This examination, like all CT scans performed in the [facility], was performed utilizing techniques to minimize radiation dose exposure, including the use of iterative reconstruction and automated exposure control  IMAGE QUALITY:  Diagnostic  FINDINGS: PARENCHYMA:  No intracranial mass, mass effect or midline shift  No CT signs of acute infarction  No acute parenchymal hemorrhage  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  VENTRICLES AND EXTRA-AXIAL SPACES:  Normal for the patient's age  VISUALIZED ORBITS: Normal visualized orbits  PARANASAL SINUSES: Normal visualized paranasal sinuses  CALVARIUM AND EXTRACRANIAL SOFT TISSUES:  Normal      Impression: No acute intracranial abnormality  Workstation performed: [id number]     TRAUMA - CT spine cervical wo contrast    Result Date: 4/19/2023  Narrative: CT CERVICAL SPINE - WITHOUT CONTRAST INDICATION:   TRAUMA  COMPARISON:  None  TECHNIQUE:  CT examination of the cervical spine was performed without intravenous contrast   Contiguous axial images were obtained  Multiplanar 2D reformatted images were created from the source data  Radiation dose length product (DLP) for this visit:  396 mGy-cm   This examination, like all CT scans performed in the [facility], was performed utilizing techniques to minimize radiation dose exposure, including the use of iterative reconstruction and automated exposure control  IMAGE QUALITY:  Diagnostic  FINDINGS: ALIGNMENT:  Normal alignment of the cervical spine  No subluxation  VERTEBRAE:  No fracture   DEGENERATIVE CHANGES: Loss of disc height at C4-C5 and C5-C6  PREVERTEBRAL AND PARASPINAL SOFT TISSUES: Unremarkable THORACIC INLET:  Normal      Impression: No cervical spine fracture or traumatic malalignment  Workstation performed: [id number]     MRI brain wo contrast    Result Date: 4/20/2023  Narrative: MRI BRAIN WITHOUT CONTRAST INDICATION: stroke  Fall  COMPARISON:   CT dated 4/19/2023  TECHNIQUE:  Multiplanar, multisequence imaging of the brain was performed  IMAGE QUALITY:  Diagnostic  FINDINGS: BRAIN PARENCHYMA:  There is no discrete mass, mass effect or midline shift  There is no intracranial hemorrhage  There is no evidence of acute infarction and diffusion imaging is unremarkable  There are T2/FLAIR hyperintensities in the periventricular and subcortical white matter due to chronic microangiopathy  Prominent perivascular space in the inferior left basal ganglia  Cortical volume loss  VENTRICLES:  Normal for the patient's age  SELLA AND PITUITARY GLAND:  Normal  ORBITS:  Bilateral lens replacement  PARANASAL SINUSES:  Normal  VASCULATURE:  Evaluation of the major intracranial vasculature demonstrates appropriate flow voids  CALVARIUM AND SKULL BASE:  Normal  EXTRACRANIAL SOFT TISSUES:  Stable bilateral scalp soft tissue swelling  Incidental 7 mm Tornwaldt cyst in the posterior nasopharynx  Impression: No acute intracranial pathology  Chronic microangiopathy  Workstation performed: [id number]     XR Trauma pelvis ap only 1 or 2 vw    Result Date: 4/19/2023  Narrative: PELVIS INDICATION:   TRAUMA  COMPARISON:  None VIEWS:  XR PELVIS AP ONLY 1 OR 2 VW FINDINGS: No acute fracture or hip dislocation  Right hip prosthesis  No lucency  Mild left hip joint space narrowing  No lytic or blastic osseous lesion  Soft tissues are unremarkable  The visualized lumbar spine is unremarkable  Impression: No acute osseous abnormality   Workstation performed: [id number]     TRAUMA - CT chest abdomen pelvis w contrast    Result Date: 4/19/2023  Narrative: CT CHEST, ABDOMEN AND PELVIS WITH IV CONTRAST INDICATION:   TRAUMA  COMPARISON:  None  TECHNIQUE: CT examination of the chest, abdomen and pelvis was performed  Multiplanar 2D reformatted images were created from the source data  Radiation dose length product (DLP) for this visit:  966 mGy-cm   This examination, like all CT scans performed in the [facility], was performed utilizing techniques to minimize radiation dose exposure, including the use of iterative reconstruction and automated exposure control  IV Contrast:  100 mL of iohexol (OMNIPAQUE) Enteric Contrast: Enteric contrast was administered  FINDINGS: CHEST LUNGS:  Lungs are clear  There is no tracheal or endobronchial lesion  PLEURA:  Unremarkable  HEART/GREAT VESSELS: Heart is unremarkable for patient's age  No thoracic aortic aneurysm  MEDIASTINUM AND WAYNE:  Unremarkable  CHEST WALL AND LOWER NECK:  Unremarkable  ABDOMEN LIVER/BILIARY TREE:  Unremarkable  GALLBLADDER:  No calcified gallstones  No pericholecystic inflammatory change  SPLEEN:  Unremarkable  PANCREAS:  Unremarkable  ADRENAL GLANDS:  Unremarkable  KIDNEYS/URETERS:  Left renal simple cyst measuring 2 6 cm    No hydronephrosis  STOMACH AND BOWEL:  There is colonic diverticulosis without evidence of acute diverticulitis  Polypoid enhancing mass in the gastric body along the lateral wall in series 2 image 111 measuring 1 8 x 1 3 cm  APPENDIX:  No findings to suggest appendicitis  ABDOMINOPELVIC CAVITY:  No ascites  No pneumoperitoneum  No lymphadenopathy  VESSELS:  Unremarkable for patient's age  PELVIS REPRODUCTIVE ORGANS:  Surgical changes of prior hysterectomy  URINARY BLADDER:  Unremarkable  ABDOMINAL WALL/INGUINAL REGIONS:  Unremarkable  OSSEOUS STRUCTURES: Streak artifact from right hip prosthesis  No acute fracture or destructive osseous lesion  Impression: No CT findings of trauma   Polypoid enhancing mass in the gastric body along the lateral wall in series 2 image 111 measuring 1 8 x 1 3 cm  Correlate with GI symptoms and GI consult  Workstation performed: [id number]     EKG/Pathology/Other Studies:   Lab Results   Component Value Date    VENTRATE 63 04/19/2023    ATRIALRATE 63 04/19/2023    PRINT 172 04/19/2023    QRSDINT 78 04/19/2023    QTINT 436 04/19/2023    QTCINT 446 04/19/2023    PAXIS 23 04/19/2023    QRSAXIS -12 04/19/2023    TWAVEAXIS 106 04/19/2023        Code Status: Level 1 - Full Code  Advance Directive and Living Will:      Power of :    POLST:      Screenings:    1  Nutrition Screening  · Not applicable    2  Pain Screening  Pain Screening: Pain Assessment  Pain Assessment Tool: 0-10  Pain Score: 0  Pain Location/Orientation: Location: Neck  Pain Onset/Description: Frequency: Intermittent, Descriptor: Aching  Effect of Pain on Daily Activities: comfort  Patient's Stated Pain Goal: No pain  Hospital Pain Intervention(s): Medication (See MAR), Heat applied  Multiple Pain Sites: No    3  Suicide Screening  Suicide Risk Assessment completed by the Consultant: Protective Factors: no current suicidal ideation  Based on today's assessment, Lisa Betts presents the following risk of harm to self: none  Counseling / Coordination of Care: Total floor / unit time spent today 60 minutes  Greater than 50% of total time was spent with the patient and / or family counseling and / or coordination of care   A description of the counseling / coordination of care: chart review, direct patient care, hand off to primary team

## 2023-04-23 NOTE — PLAN OF CARE
Problem: Potential for Falls  Goal: Patient will remain free of falls  Description: INTERVENTIONS:  - Educate patient/family on patient safety including physical limitations  - Instruct patient to call for assistance with activity   - Consult OT/PT to assist with strengthening/mobility   - Keep Call bell within reach  - Keep bed low and locked with side rails adjusted as appropriate  - Keep care items and personal belongings within reach  - Initiate and maintain comfort rounds  - Make Fall Risk Sign visible to staff  - Offer Toileting every 2 Hours, in advance of need  - Initiate/Maintain bed/chair alarm  - Apply yellow socks and bracelet for high fall risk patients  - Consider moving patient to room near nurses station  Outcome: Progressing     Problem: PAIN - ADULT  Goal: Verbalizes/displays adequate comfort level or baseline comfort level  Description: Interventions:  - Encourage patient to monitor pain and request assistance  - Assess pain using appropriate pain scale  - Administer analgesics based on type and severity of pain and evaluate response  - Implement non-pharmacological measures as appropriate and evaluate response  - Consider cultural and social influences on pain and pain management  - Notify physician/advanced practitioner if interventions unsuccessful or patient reports new pain  Outcome: Progressing     Problem: INFECTION - ADULT  Goal: Absence or prevention of progression during hospitalization  Description: INTERVENTIONS:  - Assess and monitor for signs and symptoms of infection  - Monitor lab/diagnostic results  - Monitor all insertion sites, i e  indwelling lines, tubes, and drains  - Monitor endotracheal if appropriate and nasal secretions for changes in amount and color  - [place] appropriate cooling/warming therapies per order  - Administer medications as ordered  - Instruct and encourage patient and family to use good hand hygiene technique  - Identify and instruct in appropriate isolation precautions for identified infection/condition  Outcome: Progressing     Problem: SAFETY ADULT  Goal: Patient will remain free of falls  Description: INTERVENTIONS:  - Educate patient/family on patient safety including physical limitations  - Instruct patient to call for assistance with activity   - Consult OT/PT to assist with strengthening/mobility   - Keep Call bell within reach  - Keep bed low and locked with side rails adjusted as appropriate  - Keep care items and personal belongings within reach  - Initiate and maintain comfort rounds  - Make Fall Risk Sign visible to staff  - Offer Toileting every 2 Hours, in advance of need  - Initiate/Maintain bed/chair alarm  - Apply yellow socks and bracelet for high fall risk patients  - Consider moving patient to room near nurses station  Outcome: Progressing  Goal: Maintain or return to baseline ADL function  Description: INTERVENTIONS:  - Educate patient/family on patient safety including physical limitations  - Instruct patient to call for assistance with activity   - Consult OT/PT to assist with strengthening/mobility   - Keep Call bell within reach  - Keep bed low and locked with side rails adjusted as appropriate  - Keep care items and personal belongings within reach  - Initiate and maintain comfort rounds  - Make Fall Risk Sign visible to staff  - Offer Toileting every 2 Hours, in advance of need  - Initiate/Maintain bed/chair alarm  - Apply yellow socks and bracelet for high fall risk patients  - Consider moving patient to room near nurses station  Outcome: Progressing  Goal: Maintains/Returns to pre admission functional level  Description: INTERVENTIONS:  - Educate patient/family on patient safety including physical limitations  - Instruct patient to call for assistance with activity   - Consult OT/PT to assist with strengthening/mobility   - Keep Call bell within reach  - Keep bed low and locked with side rails adjusted as appropriate  - Keep care items and personal belongings within reach  - Initiate and maintain comfort rounds  - Make Fall Risk Sign visible to staff  - Offer Toileting every 2 Hours, in advance of need  - Initiate/Maintain bed/chair alarm  - Apply yellow socks and bracelet for high fall risk patients  - Consider moving patient to room near nurses station  Outcome: Progressing     Problem: DISCHARGE PLANNING  Goal: Discharge to home or other facility with appropriate resources  Description: INTERVENTIONS:  - Identify barriers to discharge w/patient and caregiver  - Arrange for needed discharge resources and transportation as appropriate  - Identify discharge learning needs (meds, wound care, etc )  - Arrange for interpretive services to assist at discharge as needed  - Refer to Case Management Department for coordinating discharge planning if the patient needs post-hospital services based on physician/advanced practitioner order or complex needs related to functional status, cognitive ability, or social support system  Outcome: Progressing     Problem: Knowledge Deficit  Goal: Patient/family/caregiver demonstrates understanding of disease process, treatment plan, medications, and discharge instructions  Description: Complete learning assessment and assess knowledge base    Interventions:  - Provide teaching at level of understanding  - Provide teaching via preferred learning methods  Outcome: Progressing     Problem: MOBILITY - ADULT  Goal: Maintain or return to baseline ADL function  Description: INTERVENTIONS:  - Educate patient/family on patient safety including physical limitations  - Instruct patient to call for assistance with activity   - Consult OT/PT to assist with strengthening/mobility   - Keep Call bell within reach  - Keep bed low and locked with side rails adjusted as appropriate  - Keep care items and personal belongings within reach  - Initiate and maintain comfort rounds  - Make Fall Risk Sign visible to staff  - Offer Toileting every 2 Hours, in advance of need  - Initiate/Maintain bed/chair alarm  - Apply yellow socks and bracelet for high fall risk patients  - Consider moving patient to room near nurses station  Outcome: Progressing  Goal: Maintains/Returns to pre admission functional level  Description: INTERVENTIONS:  - Educate patient/family on patient safety including physical limitations  - Instruct patient to call for assistance with activity   - Consult OT/PT to assist with strengthening/mobility   - Keep Call bell within reach  - Keep bed low and locked with side rails adjusted as appropriate  - Keep care items and personal belongings within reach  - Initiate and maintain comfort rounds  - Make Fall Risk Sign visible to staff  - Offer Toileting every 2 Hours, in advance of need  - Initiate/Maintain bed/chair alarm  - Apply yellow socks and bracelet for high fall risk patients  - Consider moving patient to room near nurses station  Outcome: Progressing     Problem: Nutrition/Hydration-ADULT  Goal: Nutrient/Hydration intake appropriate for improving, restoring or maintaining nutritional needs  Description: Monitor and assess patient's nutrition/hydration status for malnutrition  Collaborate with interdisciplinary team and initiate plan and interventions as ordered  Monitor patient's weight and dietary intake as ordered or per policy  Utilize nutrition screening tool and intervene as necessary  Determine patient's food preferences and provide high-protein, high-caloric foods as appropriate       INTERVENTIONS:  - Monitor oral intake, urinary output, labs, and treatment plans  - Assess nutrition and hydration status and recommend course of action  - Evaluate amount of meals eaten  - Assist patient with eating if necessary   - Allow adequate time for meals  - Recommend/ encourage appropriate diets, oral nutritional supplements, and vitamin/mineral supplements  - Order, calculate, and assess calorie counts as needed  - Assess need for intravenous fluids  - Provide specific nutrition/hydration education as appropriate  - Include patient/family/caregiver in decisions related to nutrition  Outcome: Progressing

## 2023-04-23 NOTE — PROGRESS NOTES
[address]  Progress Note  Name: Inge Davila  MRN: [id number]  Unit/Bed#: -01 I Date of Admission: 4/19/2023   Date of Service: 4/23/2023 I Hospital Day: 4    Assessment/Plan   * Syncope  Assessment & Plan  · Presents after syncopal episode, patient does not recall any events and fell down 12 wooden steps sustaining scalp laceration  · Son at bedside reports 3 weeks of diarrhea-patient states history of C  difficile but this is yellow without the foul odor, unlike her previous C  difficile episode  · Monitored on telemetry for 24 hours  No arrythmia noted by previous provider telemetry has since been discontinued  · Orthostatic BP appear negative  · Losartan and hydrochlorothiazide on hold  · Scalp laceration repaired by ED-neurochecks x24 hours, CT brain no acute normality  · Pt/ot recommend subacute rehab  · MRI brain obtained: no evidence of acute CVA, hemorrhage or mass   · Continue Lipitor     Diarrhea of presumed infectious origin  Assessment & Plan  · Patient is complaining of intermittent episodes of diarrhea for the last 3 weeks      · No diarrhea noted to check stool cultures  · egd done which shows multiple gastric polyps which were biopsied    Scalp laceration  Assessment & Plan  · S/p syncopal event falling down wooden steps  · Sustained scalp laceration  · Repaired with staple and Dermabond  · Appreciate wound care consultation  · Pain control with Tylenol    Severe protein-calorie malnutrition ([address] )  Assessment & Plan  Malnutrition Findings:   Adult Malnutrition type: Chronic illness  Adult Degree of Malnutrition: Other severe protein calorie malnutrition  Malnutrition Characteristics: Inadequate energy, Weight loss                  360 Statement: Chronic severe malnutrition related to depression, decreased appetite, recent diarrhea and nausea as evidenced by < 75% estimated energy intake > 1 mo, 13 5% weight loss x 7 mo ( 9/12/22 193lb, 2/9/22 177lb, 4/19/23 167lb)  Treated with: Advance diet as medically appropriate to lactose free, low fiber diet per GI recommendations  Recommend daily weights for nutrition monitoring  Will add ensure plus high PRO BID which is lactose free  BMI Findings: Body mass index is 28 67 kg/m²  Gastric polyp  Assessment & Plan  · Gastric polyp, incidental finding on CT abdomen of pelvis during trauma work-up of 1 8 and 1 3 cm along the lateral wall  · Extensive history of esophagitis  · Continue PTA Protonix  · GI consulted recommended  · EGD completed polyps removed and sent for exam  Out patient follow up  · Recommend surgical lite diet  Primary hypertension  Assessment & Plan  · PTA maintained on losartan 50 mg daily and hydrochlorothiazide 12 5 at home  · Syncopal episode with suspected orthostatic hypotension  Likely hypovolemia in setting of diarrhea at home  · BP normotensive last orthostatic bp negative   · Cont to hold losartan and hctz    Bipolar depression (HCC)  Assessment & Plan  · Follows closely with outpatient psychiatrist  · Continue PTA Lamictal 200 mg twice daily  ·  PTA lorazepam 1 mg in the a m  and afternoon, 4 mg at at bedtime  decrease bedtime dose to 2 mg  try To gradually wean down Ativan  As per son she has been having increasing loneliness anxiety and crying and at her outpatient psychiatrist has tried other medications which did not work and then he was increasing [address]  Concerned that that might be giving her some side effects of dizziness and weakness  Will ask psychiatry for evaluation while here  Patient denies any suicidal or homicidal ideation  · PDMP reviewed, no red flags         VTE Pharmacologic Prophylaxis:   Pharmacologic: Enoxaparin (Lovenox)  Mechanical VTE Prophylaxis in Place: Yes    Patient Centered Rounds: I have performed bedside rounds with nursing staff today      Discussions with Specialists or Other Care Team Provider: none    Education and Discussions with Family / Patient: dw patient and son    Time Spent for Care: 30 minutes  More than 50% of total time spent on counseling and coordination of care as described above  Current Length of Stay: 4 day(s)    Current Patient Status: Inpatient   Certification Statement: The patient will continue to require additional inpatient hospital stay due to Syncope    Discharge Plan: requires rehab placement    Code Status: Level 1 - Full Code      Subjective:   Patient states that she feels lonely at home as she is alone a lot  Denies any chest pain or shortness of breath but still has generalized weakness and some dizziness on standing up and requires assistance    Objective:     Vitals:   Temp (24hrs), Av 1 °F (36 7 °C), Min:97 9 °F (36 6 °C), Max:98 6 °F (37 °C)    Temp:  [97 9 °F (36 6 °C)-98 6 °F (37 °C)] 97 9 °F (36 6 °C)  HR:  [66-92] 92  Resp:  [16-18] 18  BP: (149-167)/(82-95) 157/82  SpO2:  [87 %-95 %] 90 %  Body mass index is 28 67 kg/m²  Input and Output Summary (last 24 hours): Intake/Output Summary (Last 24 hours) at 2023 1020  Last data filed at 2023 8813  Gross per 24 hour   Intake 1578 33 ml   Output 1600 ml   Net -21 67 ml       Physical Exam:     Physical Exam  Vitals and nursing note reviewed  Constitutional:       Appearance: Normal appearance  HENT:      Head: Normocephalic and atraumatic  Right Ear: External ear normal       Left Ear: External ear normal       Nose: Nose normal       Mouth/Throat:      Pharynx: Oropharynx is clear  Cardiovascular:      Rate and Rhythm: Normal rate and regular rhythm  Heart sounds: Normal heart sounds  Pulmonary:      Effort: Pulmonary effort is normal       Breath sounds: Normal breath sounds  Abdominal:      General: Bowel sounds are normal       Palpations: Abdomen is soft  Tenderness: There is no abdominal tenderness  Musculoskeletal:         General: Normal range of motion        Cervical back: Normal range of motion and neck supple  Skin:     General: Skin is warm and dry  Capillary Refill: Capillary refill takes less than 2 seconds  Neurological:      General: No focal deficit present  Mental Status: She is alert  Comments: Oriented to person and place   Psychiatric:         Mood and Affect: Mood normal            Additional Data:     Labs:    Results from last 7 days   Lab Units 04/22/23  0506 04/20/23  0535 04/19/23  1525   WBC Thousand/uL 5 63   < > 11 63*   HEMOGLOBIN g/dL 11 3*   < > 14 4   HEMATOCRIT % 34 7*   < > 44 1   PLATELETS Thousands/uL 266   < > 331   NEUTROS PCT %  --   --  82*   LYMPHS PCT %  --   --  11*   MONOS PCT %  --   --  6   EOS PCT %  --   --  0    < > = values in this interval not displayed  Results from last 7 days   Lab Units 04/23/23  0526 04/20/23  0535 04/19/23  1525   SODIUM mmol/L 144   < > 142   POTASSIUM mmol/L 3 6   < > 3 3*   CHLORIDE mmol/L 111*   < > 104   CO2 mmol/L 27   < > 27   BUN mg/dL 14   < > 9   CREATININE mg/dL 0 79   < > 0 90   ANION GAP mmol/L 6   < > 11   CALCIUM mg/dL 9 3   < > 9 4   ALBUMIN g/dL  --   --  4 3   TOTAL BILIRUBIN mg/dL  --   --  0 81   ALK PHOS U/L  --   --  97   ALT U/L  --   --  36   AST U/L  --   --  22   GLUCOSE RANDOM mg/dL 99   < > 117    < > = values in this interval not displayed  Results from last 7 days   Lab Units 04/19/23  1525   INR  1 06     Results from last 7 days   Lab Units 04/19/23  1539   POC GLUCOSE mg/dl 109                   * I Have Reviewed All Lab Data Listed Above  * Additional Pertinent Lab Tests Reviewed:  [address] Admission Reviewed    Imaging:    Imaging Reports Reviewed Today Include: none  Imaging Personally Reviewed by Myself Includes:  none    Recent Cultures (last 7 days):           Last 24 Hours Medication List:   Current Facility-Administered Medications   Medication Dose Route Frequency Provider Last Rate   • acetaminophen  650 mg Oral Q6H PRN ASHVIN Milian     • aspirin  81 mg Oral Daily Jaylin Fitzgerald MD     • atorvastatin  40 mg Oral Daily Asya S Molly, CRNP     • enoxaparin  40 mg Subcutaneous Daily Asya S Molly, CRNP     • lamoTRIgine  200 mg Oral BID Asya S Molly, CRNP     • LORazepam  0 5 mg Oral Q8H PRN Jaylin Fitzgerald MD     • LORazepam  1 mg Oral BID Asya S Molly, CRNP     • LORazepam  2 mg Oral HS Jaylin Fitzgerald MD     • meclizine  25 mg Oral Q8H [address] Jaylin Fitzgerald MD     • oxyCODONE  5 mg Oral Q6H PRN Asya S Molly, CRNP     • pantoprazole  40 mg Oral Early Morning Asya S Molly, CRNP          Today, Patient Was Seen By: Jaylin Fitzgerald MD    ** Please Note: Dictation voice to text software may have been used in the creation of this document   **

## 2023-04-23 NOTE — ASSESSMENT & PLAN NOTE
· Follows closely with outpatient psychiatrist  · Continue PTA Lamictal 200 mg twice daily  ·  PTA lorazepam 1 mg in the a m  and afternoon, 4 mg at at bedtime  decrease bedtime dose to 2 mg  try To gradually wean down Ativan  As per son she has been having increasing loneliness anxiety and crying and at her outpatient psychiatrist has tried other medications which did not work and then he was increasing [address]  Concerned that that might be giving her some side effects of dizziness and weakness  Will ask psychiatry for evaluation while here  Patient denies any suicidal or homicidal ideation    · PDMP reviewed, no red flags

## 2023-04-24 ENCOUNTER — APPOINTMENT (INPATIENT)
Dept: RADIOLOGY | Facility: HOSPITAL | Age: 73
End: 2023-04-24

## 2023-04-24 LAB
CORTIS SERPL-MCNC: 8.7 UG/DL
ERYTHROCYTE [DISTWIDTH] IN BLOOD BY AUTOMATED COUNT: 15.2 % (ref 11.6–15.1)
GLUCOSE SERPL-MCNC: 128 MG/DL (ref 65–140)
HCT VFR BLD AUTO: 37.7 % (ref 34.8–46.1)
HCT VFR BLD AUTO: 39.3 % (ref 34.8–46.1)
HGB BLD-MCNC: 12.3 G/DL (ref 11.5–15.4)
HGB BLD-MCNC: 12.8 G/DL (ref 11.5–15.4)
MCH RBC QN AUTO: 29.8 PG (ref 26.8–34.3)
MCHC RBC AUTO-ENTMCNC: 32.6 G/DL (ref 31.4–37.4)
MCV RBC AUTO: 91 FL (ref 82–98)
PLATELET # BLD AUTO: 316 THOUSANDS/UL (ref 149–390)
PMV BLD AUTO: 9.1 FL (ref 8.9–12.7)
RBC # BLD AUTO: 4.3 MILLION/UL (ref 3.81–5.12)
WBC # BLD AUTO: 9.08 THOUSAND/UL (ref 4.31–10.16)

## 2023-04-24 RX ORDER — ACETAMINOPHEN 325 MG/1
650 TABLET ORAL EVERY 6 HOURS PRN
Refills: 0
Start: 2023-04-24

## 2023-04-24 RX ORDER — ASPIRIN 81 MG/1
81 TABLET ORAL DAILY
Refills: 0
Start: 2023-04-25

## 2023-04-24 RX ORDER — LORAZEPAM 0.5 MG/1
0.5 TABLET ORAL ONCE
Status: COMPLETED | OUTPATIENT
Start: 2023-04-24 | End: 2023-04-24

## 2023-04-24 RX ORDER — LANOLIN ALCOHOL/MO/W.PET/CERES
6 CREAM (GRAM) TOPICAL
Refills: 0
Start: 2023-04-24

## 2023-04-24 RX ORDER — LORAZEPAM 2 MG/1
1 TABLET ORAL 2 TIMES DAILY
Qty: 10 TABLET | Refills: 0 | Status: SHIPPED | OUTPATIENT
Start: 2023-04-24 | End: 2023-05-04

## 2023-04-24 RX ORDER — PANTOPRAZOLE SODIUM 40 MG/10ML
40 INJECTION, POWDER, LYOPHILIZED, FOR SOLUTION INTRAVENOUS EVERY 12 HOURS SCHEDULED
Status: DISCONTINUED | OUTPATIENT
Start: 2023-04-24 | End: 2023-04-27 | Stop reason: HOSPADM

## 2023-04-24 RX ORDER — LORAZEPAM 2 MG/1
2 TABLET ORAL
Qty: 10 TABLET | Refills: 0 | Status: SHIPPED | OUTPATIENT
Start: 2023-04-24 | End: 2023-05-04

## 2023-04-24 RX ORDER — LANOLIN ALCOHOL/MO/W.PET/CERES
6 CREAM (GRAM) TOPICAL
Status: DISCONTINUED | OUTPATIENT
Start: 2023-04-24 | End: 2023-04-27 | Stop reason: HOSPADM

## 2023-04-24 RX ORDER — BISACODYL 10 MG
10 SUPPOSITORY, RECTAL RECTAL DAILY
Status: DISCONTINUED | OUTPATIENT
Start: 2023-04-24 | End: 2023-04-25

## 2023-04-24 RX ORDER — PANTOPRAZOLE SODIUM 40 MG/1
40 TABLET, DELAYED RELEASE ORAL
Refills: 0
Start: 2023-04-25 | End: 2023-04-27 | Stop reason: SDUPTHER

## 2023-04-24 RX ORDER — ONDANSETRON 2 MG/ML
4 INJECTION INTRAMUSCULAR; INTRAVENOUS EVERY 6 HOURS PRN
Status: DISCONTINUED | OUTPATIENT
Start: 2023-04-24 | End: 2023-04-27 | Stop reason: HOSPADM

## 2023-04-24 RX ORDER — AMOXICILLIN 250 MG
1 CAPSULE ORAL
Status: DISCONTINUED | OUTPATIENT
Start: 2023-04-24 | End: 2023-04-27 | Stop reason: HOSPADM

## 2023-04-24 RX ORDER — MECLIZINE HYDROCHLORIDE 25 MG/1
25 TABLET ORAL EVERY 8 HOURS SCHEDULED
Qty: 30 TABLET | Refills: 0
Start: 2023-04-24

## 2023-04-24 RX ADMIN — Medication 6 MG: at 21:06

## 2023-04-24 RX ADMIN — MECLIZINE HYDROCHLORIDE 25 MG: 25 TABLET ORAL at 21:06

## 2023-04-24 RX ADMIN — PANTOPRAZOLE SODIUM 40 MG: 40 TABLET, DELAYED RELEASE ORAL at 06:17

## 2023-04-24 RX ADMIN — ASPIRIN 81 MG: 81 TABLET, COATED ORAL at 09:46

## 2023-04-24 RX ADMIN — LORAZEPAM 0.5 MG: 0.5 TABLET ORAL at 01:12

## 2023-04-24 RX ADMIN — LORAZEPAM 1 MG: 1 TABLET ORAL at 09:46

## 2023-04-24 RX ADMIN — MECLIZINE HYDROCHLORIDE 25 MG: 25 TABLET ORAL at 06:17

## 2023-04-24 RX ADMIN — Medication 6 MG: at 00:43

## 2023-04-24 RX ADMIN — PANTOPRAZOLE SODIUM 40 MG: 40 INJECTION, POWDER, FOR SOLUTION INTRAVENOUS at 21:05

## 2023-04-24 RX ADMIN — MECLIZINE HYDROCHLORIDE 25 MG: 25 TABLET ORAL at 14:40

## 2023-04-24 RX ADMIN — LORAZEPAM 1 MG: 1 TABLET ORAL at 14:40

## 2023-04-24 RX ADMIN — DOCUSATE SODIUM AND SENNOSIDES 1 TABLET: 8.6; 5 TABLET, FILM COATED ORAL at 21:06

## 2023-04-24 RX ADMIN — BISACODYL 10 MG: 10 SUPPOSITORY RECTAL at 14:40

## 2023-04-24 RX ADMIN — LORAZEPAM 2 MG: 1 TABLET ORAL at 21:06

## 2023-04-24 RX ADMIN — LAMOTRIGINE 200 MG: 100 TABLET ORAL at 09:46

## 2023-04-24 RX ADMIN — ATORVASTATIN CALCIUM 40 MG: 40 TABLET, FILM COATED ORAL at 09:46

## 2023-04-24 RX ADMIN — ENOXAPARIN SODIUM 40 MG: 40 INJECTION SUBCUTANEOUS at 09:46

## 2023-04-24 RX ADMIN — LAMOTRIGINE 200 MG: 100 TABLET ORAL at 17:57

## 2023-04-24 RX ADMIN — ONDANSETRON 4 MG: 2 INJECTION INTRAMUSCULAR; INTRAVENOUS at 14:40

## 2023-04-24 NOTE — ASSESSMENT & PLAN NOTE
· Follows closely with outpatient psychiatrist  · Continue PTA Lamictal 200 mg twice daily  ·  PTA lorazepam 1 mg in the a m  and afternoon, 4 mg at at bedtime  decrease bedtime dose to 2 mg  try To gradually wean down Ativan  As per son she has been having increasing loneliness anxiety and crying and at her outpatient psychiatrist has tried other medications which did not work and then he was increasing [address]  Concerned that that might be giving her some side effects of dizziness and weakness  Will ask psychiatry for evaluation while here  Patient denies any suicidal or homicidal ideation  needs outpatient psych follow up  · PDMP reviewed, no red flags

## 2023-04-24 NOTE — NURSING NOTE
"Pt ringing call bell and expressing much frustration regarding titration and weaning of ativan  Pt exhibiting s/s of anxiety  Pt tearful, having trouble sleeping and having racing thoughts  On call [facility] made aware and gave order for melatonin  Pt stated \"this will not help, they should have never decreased my medication so fast\"  Pt continuing to express much frustration w ativan medication change  This nurse offered comfort, reassurance, and offered non med interventions such as music and lavender scent, but pt stated music keeps her up and that lavender makes her sneeze  Again pt continued to be fixated on medication change  Pt stating that she wants to call the [facility] emergency hotline and that her pyschiatrist will be made aware of this  Informed pt that this nurse does not take orders from [facility] but that this nurse will express pt's concern to on call SLIM  On call SLIM gave one time dose of additional 0 5mg ativan po     "

## 2023-04-24 NOTE — PLAN OF CARE
Problem: Potential for Falls  Goal: Patient will remain free of falls  Description: INTERVENTIONS:  - Educate patient/family on patient safety including physical limitations  - Instruct patient to call for assistance with activity   - Consult OT/PT to assist with strengthening/mobility   - Keep Call bell within reach  - Keep bed low and locked with side rails adjusted as appropriate  - Keep care items and personal belongings within reach  - Initiate and maintain comfort rounds  - Make Fall Risk Sign visible to staff  - Offer Toileting every 2 Hours, in advance of need  - Initiate/Maintain bed/chair alarm  - Apply yellow socks and bracelet for high fall risk patients  - Consider moving patient to room near nurses station  Outcome: Progressing     Problem: PAIN - ADULT  Goal: Verbalizes/displays adequate comfort level or baseline comfort level  Description: Interventions:  - Encourage patient to monitor pain and request assistance  - Assess pain using appropriate pain scale  - Administer analgesics based on type and severity of pain and evaluate response  - Implement non-pharmacological measures as appropriate and evaluate response  - Consider cultural and social influences on pain and pain management  - Notify physician/advanced practitioner if interventions unsuccessful or patient reports new pain  Outcome: Progressing     Problem: INFECTION - ADULT  Goal: Absence or prevention of progression during hospitalization  Description: INTERVENTIONS:  - Assess and monitor for signs and symptoms of infection  - Monitor lab/diagnostic results  - Monitor all insertion sites, i e  indwelling lines, tubes, and drains  - Monitor endotracheal if appropriate and nasal secretions for changes in amount and color  - [place] appropriate cooling/warming therapies per order  - Administer medications as ordered  - Instruct and encourage patient and family to use good hand hygiene technique  - Identify and instruct in appropriate isolation precautions for identified infection/condition  Outcome: Progressing     Problem: SAFETY ADULT  Goal: Patient will remain free of falls  Description: INTERVENTIONS:  - Educate patient/family on patient safety including physical limitations  - Instruct patient to call for assistance with activity   - Consult OT/PT to assist with strengthening/mobility   - Keep Call bell within reach  - Keep bed low and locked with side rails adjusted as appropriate  - Keep care items and personal belongings within reach  - Initiate and maintain comfort rounds  - Make Fall Risk Sign visible to staff  - Offer Toileting every 2 Hours, in advance of need  - Initiate/Maintain bed/chair alarm  - Apply yellow socks and bracelet for high fall risk patients  - Consider moving patient to room near nurses station  Outcome: Progressing  Goal: Maintain or return to baseline ADL function  Description: INTERVENTIONS:  - Educate patient/family on patient safety including physical limitations  - Instruct patient to call for assistance with activity   - Consult OT/PT to assist with strengthening/mobility   - Keep Call bell within reach  - Keep bed low and locked with side rails adjusted as appropriate  - Keep care items and personal belongings within reach  - Initiate and maintain comfort rounds  - Make Fall Risk Sign visible to staff  - Offer Toileting every 2 Hours, in advance of need  - Initiate/Maintain bed/chair alarm  - Apply yellow socks and bracelet for high fall risk patients  - Consider moving patient to room near nurses station  Outcome: Progressing  Goal: Maintains/Returns to pre admission functional level  Description: INTERVENTIONS:  - Educate patient/family on patient safety including physical limitations  - Instruct patient to call for assistance with activity   - Consult OT/PT to assist with strengthening/mobility   - Keep Call bell within reach  - Keep bed low and locked with side rails adjusted as appropriate  - Keep care items and personal belongings within reach  - Initiate and maintain comfort rounds  - Make Fall Risk Sign visible to staff  - Offer Toileting every 2 Hours, in advance of need  - Initiate/Maintain bed/chair alarm  - Apply yellow socks and bracelet for high fall risk patients  - Consider moving patient to room near nurses station  Outcome: Progressing     Problem: DISCHARGE PLANNING  Goal: Discharge to home or other facility with appropriate resources  Description: INTERVENTIONS:  - Identify barriers to discharge w/patient and caregiver  - Arrange for needed discharge resources and transportation as appropriate  - Identify discharge learning needs (meds, wound care, etc )  - Arrange for interpretive services to assist at discharge as needed  - Refer to Case Management Department for coordinating discharge planning if the patient needs post-hospital services based on physician/advanced practitioner order or complex needs related to functional status, cognitive ability, or social support system  Outcome: Progressing     Problem: Knowledge Deficit  Goal: Patient/family/caregiver demonstrates understanding of disease process, treatment plan, medications, and discharge instructions  Description: Complete learning assessment and assess knowledge base    Interventions:  - Provide teaching at level of understanding  - Provide teaching via preferred learning methods  Outcome: Progressing     Problem: MOBILITY - ADULT  Goal: Maintain or return to baseline ADL function  Description: INTERVENTIONS:  - Educate patient/family on patient safety including physical limitations  - Instruct patient to call for assistance with activity   - Consult OT/PT to assist with strengthening/mobility   - Keep Call bell within reach  - Keep bed low and locked with side rails adjusted as appropriate  - Keep care items and personal belongings within reach  - Initiate and maintain comfort rounds  - Make Fall Risk Sign visible to staff  - Offer Toileting every 2 Hours, in advance of need  - Initiate/Maintain bed/chair alarm  - Apply yellow socks and bracelet for high fall risk patients  - Consider moving patient to room near nurses station  Outcome: Progressing  Goal: Maintains/Returns to pre admission functional level  Description: INTERVENTIONS:  - Educate patient/family on patient safety including physical limitations  - Instruct patient to call for assistance with activity   - Consult OT/PT to assist with strengthening/mobility   - Keep Call bell within reach  - Keep bed low and locked with side rails adjusted as appropriate  - Keep care items and personal belongings within reach  - Initiate and maintain comfort rounds  - Make Fall Risk Sign visible to staff  - Offer Toileting every 2 Hours, in advance of need  - Initiate/Maintain bed/chair alarm  - Apply yellow socks and bracelet for high fall risk patients  - Consider moving patient to room near nurses station  Outcome: Progressing     Problem: Nutrition/Hydration-ADULT  Goal: Nutrient/Hydration intake appropriate for improving, restoring or maintaining nutritional needs  Description: Monitor and assess patient's nutrition/hydration status for malnutrition  Collaborate with interdisciplinary team and initiate plan and interventions as ordered  Monitor patient's weight and dietary intake as ordered or per policy  Utilize nutrition screening tool and intervene as necessary  Determine patient's food preferences and provide high-protein, high-caloric foods as appropriate       INTERVENTIONS:  - Monitor oral intake, urinary output, labs, and treatment plans  - Assess nutrition and hydration status and recommend course of action  - Evaluate amount of meals eaten  - Assist patient with eating if necessary   - Allow adequate time for meals  - Recommend/ encourage appropriate diets, oral nutritional supplements, and vitamin/mineral supplements  - Order, calculate, and assess calorie counts as needed  - Recommend, monitor, and adjust tube feedings and TPN/PPN based on assessed needs  - Assess need for intravenous fluids  - Provide specific nutrition/hydration education as appropriate  - Include patient/family/caregiver in decisions related to nutrition  Outcome: Progressing

## 2023-04-24 NOTE — OCCUPATIONAL THERAPY NOTE
Occupational Therapy Progress Note     Patient Name: Kimberly Randall  Today's Date: 4/24/2023  Problem List  Principal Problem:    Syncope  Active Problems:    Scalp laceration    Bipolar depression ([facility] )    Primary hypertension    Gastric polyp    Severe protein-calorie malnutrition ([facility] )    Diarrhea of presumed infectious origin    Disease of thyroid gland    Anxiety    Esophagitis       04/24/23 1332   Note Type   Note Type Treatment   Pain Assessment   Pain Assessment Tool 0-10   Pain Score No Pain   Restrictions/Precautions   Other Precautions Chair Alarm; Bed Alarm; Fall Risk   ADL   Toileting Assistance  2  Maximal Assistance   Toileting Deficit Setup;Supervison/safety;Verbal cueing; Increased time to complete;Perineal hygiene;Clothing management down;Clothing management up   Toileting Comments Pt required Min A to maintain standing and assist for pericare and CM with BUE support on RW  Bed Mobility   Supine to Sit 4  Minimal assistance   Additional items Assist x 1; Increased time required;Verbal cues   Sit to Supine 5  Supervision   Additional items Increased time required;HOB elevated   Additional Comments Pt supine in bed at beginning of session  Supine to sit @ Min A  At end of session, sit to supine @ S  Pt supine in bed with all needs met, bed alarm intact  Transfers   Sit to Stand 4  Minimal assistance   Additional items Assist x 1; Increased time required;Verbal cues   Stand to Sit 4  Minimal assistance   Additional items Assist x 1; Increased time required;Verbal cues   Stand pivot 3  Moderate assistance   Additional items Assist x 1; Increased time required;Verbal cues   Toilet transfer 3  Moderate assistance   Additional items Assist x 1; Increased time required;Verbal cues;Standard toilet   Additional Comments STS from EOB to RW @ Min A  Pt able to complete functional mobility to/from bathroom with RW @ Min A, Mod A during turns with significant cueing on sequencing and RW management   Pt with c/o not feeling well while seated on toilet, vitals taken see below  STS from toilet to [address] [place] [address] @ Mod A  Cognition   Overall Cognitive Status WFL   Arousal/Participation Alert; Responsive; Cooperative   Attention Attends with cues to redirect   Orientation Level Oriented X4   Following Commands Follows one step commands with increased time or repetition   Comments Cueing required for safety awareness and body-in-space as pt continually trying to sit down before transfers completed   Activity Tolerance   Activity Tolerance Patient limited by fatigue   Medical Staff Made Aware Spoke with PTA Brittany   Assessment   Assessment Pt completed OT tx session #2 focused on ADL performance and functional mobility  Pt alert and agreeable to participate  PT/OT co-treat completed d/t significant mobility deficits and safety concerns  Pt required increased assistance for ADLs this session d/t not feeling well  Pt currently completing UB ADLs @ S/set-up, LB ADLs @ Max A, and functional mobility with RW @ Min-Mod A with significant cues for safety  Pt demonstrating Good participation and Good potential to achieve goals but is currently demonstrating deficits in decrease activity tolerance, decrease standing balance, decrease performance during ADL tasks, decrease cognition, decrease safety awareness , increase impulsiveness, decrease generalized strength, decrease activity engagement and decrease performance during functional transfers  Continue to recommend post acute rehabilitation services upon discharge to increase safety and independence in ADL tasks and functional mobility     Plan   Treatment Interventions ADL retraining;Functional transfer training   Goal Expiration Date 05/04/23   OT Treatment Day 2   OT Frequency 3-5x/wk   Recommendation   OT Discharge Recommendation Post acute rehabilitation services   AM-PAC Daily Activity Inpatient   Lower Body Dressing 2   Bathing 2   Toileting 2   Upper Body Dressing 3   Grooming 3   Eating 4 Daily Activity Raw Score 16   Daily Activity Standardized Score (Calc for Raw Score >=11) 35 96   AM-PAC Applied Cognition Inpatient   Following a Speech/Presentation 3   Understanding Ordinary Conversation 4   Taking Medications 3   Remembering Where Things Are Placed or Put Away 4   Remembering List of 4-5 Errands 2   Taking Care of Complicated Tasks 2   Applied Cognition Raw Score 18   Applied Cognition Standardized Score 38 07      04/24/23 1346   Vitals   Pulse 94   Blood Pressure 113/77   MAP (mmHg) 102   Oxygen Therapy   SpO2 96 %     The patient's raw score on the AM-PAC Daily Activity Inpatient Short Form is 16  A raw score of less than 19 suggests the patient may benefit from discharge to home  Please refer to the recommendation of the Occupational Therapist for safe discharge planning  Pt goals to be met by 5/4/2023     1  Pt will demonstrate ability to complete supine<>sit @ Mod I in order to increase safety and independence during ADL tasks  2  Pt will demonstrate ability to complete LB dressing @ Mod I in order to increase safety and independence during meaningful tasks  3  Pt will demonstrate ability to alessia/doff socks/shoes while sitting EOB @ Mod I in order to increase safety and independence during meaningful tasks  4  Pt will demonstrate ability to complete toileting tasks including CM and pericare @ Mod I in order to increase safety and independence during meaningful tasks  5  Pt will demonstrate ability to complete EOB, chair, toilet/commode transfers @ Mod I in order to increase performance and participation during functional tasks  6  Pt will demonstrate ability to stand for 7-8 minutes while maintaining G balance with use of least restrictive device for UB support PRN  7  Pt will demonstrate ability to tolerate 30-35 minute OT session with no vc'ing for deep breathing or use of energy conservation techniques in order to increase activity tolerance during functional tasks     8  Pt will demonstrate Good carryover of use of energy conservation/compensatory strategies during ADLs and functional tasks in order to increase safety and reduce risk for falls  9  Pt will demonstrate Good attention and participation in continued evaluation of functional ambulation house hold distances in order to assist with safe d/c planning  10  Pt will attend to continued cognitive assessments 100% of the time in order to provide most appropriate d/c recommendations  11  Pt will follow 100% simple 2-step commands and be A&O x4 consistently with environmental cues to increase participation in functional activities  12  Pt will identify 3 areas of interest/hobbies and 1 intervention on how to incorporate into daily life in order to increase interaction with environment and peers as well as increase participation in meaningful tasks     13  Pt will demonstrate 100% carryover of BUE HEP in order to increase BUE MS and increase performance during functional tasks upon d/c home      Fortuna Savanah, OTR/L

## 2023-04-24 NOTE — NURSING NOTE
Pt was being set up for lunch when she had an episode of brown emesis and was diaphoretic  FSBS and vital signs were done  Dr Tomas Her notified and present  New orders received

## 2023-04-24 NOTE — PROGRESS NOTES
[address]  Progress Note  Name: Aime Ken  MRN: [id number]  Unit/Bed#: -01 I Date of Admission: 4/19/2023   Date of Service: 4/24/2023 I Hospital Day: 5    Assessment/Plan   * Syncope  Assessment & Plan  · Presents after syncopal episode, patient does not recall any events and fell down 12 wooden steps sustaining scalp laceration  · Son at bedside reports 3 weeks of diarrhea-patient states history of C  difficile but this is yellow without the foul odor, unlike her previous C  difficile episode  · Monitored on telemetry for 24 hours  No arrythmia noted by previous provider telemetry has since been discontinued  · Orthostatic BP appear negative  · Losartan and hydrochlorothiazide on hold  · Scalp laceration repaired by ED-neurochecks x24 hours, CT brain no acute normality  · Pt/ot recommend subacute rehab  · MRI brain obtained: no evidence of acute CVA, hemorrhage or mass   · Continue Lipitor   · Patient has psychosomatic symptoms due to her bipolar depression    Diarrhea of presumed infectious origin  Assessment & Plan  · Patient is complaining of intermittent episodes of diarrhea for the last 3 weeks  · No diarrhea noted to check stool cultures  · egd done which shows multiple gastric polyps which were biopsied  · Episode of vomiting today and got a little diaphoretic with brown vomitus noted    Check cbc, obstruction series placed on IV Protonix and IV Zofran and monitor for now discharge for today canceled to SNF    Scalp laceration  Assessment & Plan  · S/p syncopal event falling down wooden steps  · Sustained scalp laceration  · Repaired with staple and Dermabond  · Appreciate wound care consultation  · Pain control with Tylenol    Gastric polyp  Assessment & Plan  · Gastric polyp, incidental finding on CT abdomen of pelvis during trauma work-up of 1 8 and 1 3 cm along the lateral wall  · Extensive history of esophagitis  · Continue PTA Protonix  · GI consulted recommended  · EGD completed polyps removed and sent for exam  Out patient follow up  · Recommend surgical lite diet  · Today had episode of brown vomitus and diaphoresis  Will check CBC obstruction series placed on Protonix and IV Zofran and monitor for now    Bipolar depression [facility])  Assessment & Plan  · Follows closely with outpatient psychiatrist  · Continue PTA Lamictal 200 mg twice daily  ·  PTA lorazepam 1 mg in the a m  and afternoon, 4 mg at at bedtime  decrease bedtime dose to 2 mg  try To gradually wean down Ativan  As per son she has been having increasing loneliness anxiety and crying and at her outpatient psychiatrist has tried other medications which did not work and then he was increasing [address]  Concerned that that might be giving her some side effects of dizziness and weakness  Will ask psychiatry for evaluation while here  Patient denies any suicidal or homicidal ideation  needs outpatient psych follow up  · PDMP reviewed, no red flags         VTE Pharmacologic Prophylaxis:   Pharmacologic: Pharmacologic VTE Prophylaxis contraindicated due to brown emesis  Mechanical VTE Prophylaxis in Place: Yes    Patient Centered Rounds: I have performed bedside rounds with nursing staff today  Discussions with Specialists or Other Care Team Provider: edwardo gi    Education and Discussions with Family / Patient: edwardo patient and son    Time Spent for Care: 30 minutes  More than 50% of total time spent on counseling and coordination of care as described above  Current Length of Stay: 5 day(s)    Current Patient Status: Inpatient   Certification Statement: The patient will continue to require additional inpatient hospital stay due to syncope    Discharge Plan: once medically cleared in 1-2 days will go to [facility]    Code Status: Level 1 - Full Code      Subjective:   Patient was feeling well this morning no complaints but after lunch had episode of brown emesis and became a little diaphoretic  Discharge for today canceled  No diarrhea reported    Objective:     Vitals:   Temp (24hrs), Av 5 °F (36 4 °C), Min:97 °F (36 1 °C), Max:98 1 °F (36 7 °C)    Temp:  [97 °F (36 1 °C)-98 1 °F (36 7 °C)] 97 °F (36 1 °C)  HR:  [68-87] 68  Resp:  [16-21] 21  BP: (103-168)/(60-91) 103/61  SpO2:  [88 %-100 %] 93 %  Body mass index is 28 67 kg/m²  Input and Output Summary (last 24 hours): Intake/Output Summary (Last 24 hours) at 2023 1157  Last data filed at 2023 8325  Gross per 24 hour   Intake 480 ml   Output 1058 ml   Net -578 ml       Physical Exam:     Physical Exam  Vitals and nursing note reviewed  Constitutional:       Appearance: Normal appearance  HENT:      Head: Normocephalic and atraumatic  Right Ear: External ear normal       Left Ear: External ear normal       Nose: Nose normal       Mouth/Throat:      Pharynx: Oropharynx is clear  Eyes:      Pupils: Pupils are equal, round, and reactive to light  Cardiovascular:      Rate and Rhythm: Normal rate and regular rhythm  Heart sounds: Normal heart sounds  Pulmonary:      Effort: Pulmonary effort is normal       Breath sounds: Normal breath sounds  Abdominal:      General: Bowel sounds are normal       Palpations: Abdomen is soft  Tenderness: There is abdominal tenderness  Musculoskeletal:         General: Normal range of motion  Cervical back: Normal range of motion and neck supple  Skin:     General: Skin is warm and dry  Capillary Refill: Capillary refill takes less than 2 seconds  Neurological:      General: No focal deficit present  Mental Status: She is alert and oriented to person, place, and time     Psychiatric:      Comments: anxious           Additional Data:     Labs:    Results from last 7 days   Lab Units 23  0506 23  0535 23  1525   WBC Thousand/uL 5 63   < > 11 63*   HEMOGLOBIN g/dL 11 3*   < > 14 4   HEMATOCRIT % 34 7*   < > 44 1   PLATELETS Thousands/uL 266 < > 331   NEUTROS PCT %  --   --  82*   LYMPHS PCT %  --   --  11*   MONOS PCT %  --   --  6   EOS PCT %  --   --  0    < > = values in this interval not displayed  Results from last 7 days   Lab Units 04/23/23  0526 04/20/23  0535 04/19/23  1525   SODIUM mmol/L 144   < > 142   POTASSIUM mmol/L 3 6   < > 3 3*   CHLORIDE mmol/L 111*   < > 104   CO2 mmol/L 27   < > 27   BUN mg/dL 14   < > 9   CREATININE mg/dL 0 79   < > 0 90   ANION GAP mmol/L 6   < > 11   CALCIUM mg/dL 9 3   < > 9 4   ALBUMIN g/dL  --   --  4 3   TOTAL BILIRUBIN mg/dL  --   --  0 81   ALK PHOS U/L  --   --  97   ALT U/L  --   --  36   AST U/L  --   --  22   GLUCOSE RANDOM mg/dL 99   < > 117    < > = values in this interval not displayed  Results from last 7 days   Lab Units 04/19/23  1525   INR  1 06     Results from last 7 days   Lab Units 04/19/23  1539   POC GLUCOSE mg/dl 109                   * I Have Reviewed All Lab Data Listed Above  * Additional Pertinent Lab Tests Reviewed:  [facility] Admission Reviewed    Imaging:    Imaging Reports Reviewed Today Include: Ordered x-ray obstruction series  Imaging Personally Reviewed by Myself Includes: X-ray obstruction series    Recent Cultures (last 7 days):           Last 24 Hours Medication List:   Current Facility-Administered Medications   Medication Dose Route Frequency Provider Last Rate   • acetaminophen  650 mg Oral Q6H PRN Asya S Molly, CRNP     • atorvastatin  40 mg Oral Daily Asya S Molly, CRNP     • lamoTRIgine  200 mg Oral BID Asya S Molly, CRNP     • LORazepam  1 mg Oral BID Asya S Molly, CRNP     • LORazepam  2 mg Oral HS Renetta Zarco MD     • meclizine  25 mg Oral Q8H [address] Renetta Zarco MD     • melatonin  6 mg Oral HS ASHVIN Holguin     • ondansetron  4 mg Intravenous Q6H PRN Renetta Zarco MD     • oxyCODONE  5 mg Oral Q6H PRN Asya S Molly, CRNP     • pantoprazole  40 mg Intravenous Q12H [address] Renetta Zarco MD          Today, Patient Was Seen By: Annmarie Drake MD    ** Please Note: Dictation voice to text software may have been used in the creation of this document   **

## 2023-04-24 NOTE — ASSESSMENT & PLAN NOTE
· Presents after syncopal episode, patient does not recall any events and fell down 12 wooden steps sustaining scalp laceration  · Son at bedside reports 3 weeks of diarrhea-patient states history of C  difficile but this is yellow without the foul odor, unlike her previous C  difficile episode  · Monitored on telemetry for 24 hours   No arrythmia noted by previous provider telemetry has since been discontinued  · Orthostatic BP appear negative  · Losartan and hydrochlorothiazide on hold  · Scalp laceration repaired by ED-neurochecks x24 hours, CT brain no acute normality  · Pt/ot recommend subacute rehab  · MRI brain obtained: no evidence of acute CVA, hemorrhage or mass   · Continue Lipitor   · Patient has psychosomatic symptoms due to her bipolar depression

## 2023-04-24 NOTE — CASE MANAGEMENT
Case Management Discharge Planning Note    Patient name Sung Castorena  Location /-14 MRN [id number]  : 1950 Date 2023       Current Admission Date: 2023  Current Admission Diagnosis:Syncope   Patient Active Problem List    Diagnosis Date Noted   • Breast cancer [facility])    • Disease of thyroid gland    • Anxiety    • Esophagitis    • Scalp laceration 2023   • Bipolar depression ([facility] [address] ) 2023   • Primary hypertension 2023   • Gastric polyp 2023   • Severe protein-calorie malnutrition ([facility] ) 2023   • Diarrhea of presumed infectious origin 2023   • Syncope 2023      LOS (days): 5  Geometric Mean LOS (GMLOS) (days): 2 10  Days to GMLOS:-2 6     OBJECTIVE:  Risk of Unplanned Readmission Score: 12 17         Current admission status: Inpatient   Preferred Pharmacy:   [address] [place] [address] [address] [place] [id number]  Phone: [phone] Fax: [phone]    Primary Care Provider: Misa Augustin MD    Primary Insurance: MEDICARE  Secondary Insurance: [address] [place] [address]    DISCHARGE DETAILS:        [facility] has accepted pt at time of discharge                  Cm placing transportation request to [facility] in [place] [address]

## 2023-04-24 NOTE — ASSESSMENT & PLAN NOTE
· Gastric polyp, incidental finding on CT abdomen of pelvis during trauma work-up of 1 8 and 1 3 cm along the lateral wall  · Extensive history of esophagitis  · Continue PTA Protonix  · GI consulted recommended  · EGD completed polyps removed and sent for exam  Out patient follow up  · Recommend surgical lite diet  · Today had episode of brown vomitus and diaphoresis    Will check CBC obstruction series placed on Protonix and IV Zofran and monitor for now

## 2023-04-24 NOTE — PHYSICAL THERAPY NOTE
PHYSICAL THERAPY TREATMENT NOTE  NAME:  Alo Noel  DATE: 04/24/23    Length Of Stay: 5  Performed at least 2 patient identifiers during session: Name and ID bracelet    TREATMENT:      04/24/23 6265   PT Last Visit   PT Visit Date 04/24/23   Note Type   Note Type Treatment   Pain Assessment   Pain Assessment Tool 0-10   Pain Score No Pain   Restrictions/Precautions   Weight Bearing Precautions Per Order No   Other Precautions Chair Alarm; Bed Alarm; Fall Risk   General   Chart Reviewed Yes   Family/Caregiver Present No   Cognition   Overall Cognitive Status WFL   Arousal/Participation Responsive; Cooperative   Attention Attends with cues to redirect   Orientation Level Oriented X4   Memory Decreased recall of precautions   Following Commands Follows one step commands without difficulty   Comments pt agreeable to PT session   Bed Mobility   Supine to Sit 4  Minimal assistance   Additional items Assist x 1; Increased time required;Verbal cues  (trunk management)   Sit to Supine 4  Minimal assistance   Additional items Assist x 1;Bedrails; Increased time required;Verbal cues   Additional Comments pt denies dizziness/lightheadedness with transitional movements   Transfers   Sit to Stand 4  Minimal assistance   Additional items Assist x 1; Increased time required;Verbal cues   Stand to Sit 4  Minimal assistance   Additional items Assist x 1; Increased time required;Verbal cues   Stand pivot 3  Moderate assistance   Additional items Increased time required;Verbal cues; Assist x 1  (with second staff member SBA for safety)   Toilet transfer 3  Moderate assistance   Additional items Increased time required;Verbal cues;Standard toilet;Assist x 1  (grab bar, required assist for posterior pericare)   Additional Comments pt completed functional transfers with use of RW for UB support  Ambulation/Elevation   Gait pattern Improper Weight shift;Decreased foot clearance;Narrow TOYA; Foward flexed; Inconsistent sue; Excessively slow;Short stride;Decreased hip extension;Decreased toe off;R Knee Laci;L Knee Laci   Gait Assistance   (Louis> maxA when fatgued)   Additional items Assist x 1;Verbal cues; Tactile cues  (with second staff member SBA for safety)   Assistive Device Rolling walker   Distance 15 ftx2   Ambulation/Elevation Additional Comments pt with unsteady gait requiring maxAx1-2 for safety as patient fatigues   Balance   Static Sitting Fair +   Dynamic Sitting Fair   Static Standing Poor +   Dynamic Standing Poor   Ambulatory Poor   Endurance Deficit   Endurance Deficit Yes   Activity Tolerance   Activity Tolerance Patient limited by fatigue   Medical Staff Made Aware OTR present for treatment for safety due to medical complexity   Nurse Made Aware yes   Assessment   Prognosis Guarded   Problem List Decreased strength;Decreased endurance; Impaired balance;Decreased mobility; Decreased safety awareness   Assessment Pt seen for PT treatment session this date with interventions consisting of gait training w/ emphasis on improving pt's ability to ambulate level surfaces x 15 ftx2 with min A of 2 and max A of 2 provided by therapist with RW and therapeutic activity consisting of training: bed mobility, supine<>sit transfers, sit<>stand transfers, stand pivot transfers towards B direction and toilet transfer  Pt agreeable to PT treatment session upon arrival, pt found supine in bed w/ HOB elevated, responsive  In comparison to previous session, pt with no improvements as evidenced by pt limited by fatigue, requiring assistx1-2 for safety and mobility  Post session: pt returned BTB, bed alarm engaged, all needs in reach and RN notified of session findings/recommendations  Continue to recommend post acute rehabilitation services at time of d/c in order to maximize pt's functional independence and safety w/ mobility  Pt continues to be functioning below baseline level   PT will continue to see pt during current hospitalization in order to address the deficits listed above and provide interventions consistent w/ POC in effort to achieve STGs  Barriers to Discharge Decreased caregiver support   Goals   STG Expiration Date 05/04/23   Plan   Treatment/Interventions Functional transfer training; Therapeutic exercise; Endurance training;Equipment eval/education; Bed mobility;Gait training;Patient/family training;Spoke to nursing;OT   PT Frequency 3-5x/wk   Recommendation   PT Discharge Recommendation Post acute rehabilitation services   AM-PAC Basic Mobility Inpatient   Turning in Flat Bed Without Bedrails 3   Lying on Back to Sitting on Edge of Flat Bed Without Bedrails 3   Moving Bed to Chair 2   Standing Up From Chair Using Arms 3   Walk in Room 2   Climb 3-5 Stairs With Railing 1   Basic Mobility Inpatient Raw Score 14   Basic Mobility Standardized Score 35 55   Highest Level Of Mobility   -HLM Goal 4: Move to chair/commode   JH-HLM Achieved 6: Walk 10 steps or more   Education   Education Provided Mobility training;Assistive device   Patient Reinforcement needed   End of Consult   Patient Position at End of Consult Supine; All needs within reach       The patient's AM-PAC Basic Mobility Inpatient Short Form Raw Score is 14  A Raw score of less than or equal to 16 suggests the patient may benefit from discharge to post-acute rehabilitation services  Please also refer to the recommendation of the Physical Therapist for safe discharge planning        Mateo Uribe PTA,PTA

## 2023-04-24 NOTE — ASSESSMENT & PLAN NOTE
· Follows closely with outpatient psychiatrist  · Continue PTA Lamictal 200 mg twice daily  ·  PTA lorazepam 1 mg in the a m  and afternoon, 4 mg at at bedtime  decrease bedtime dose to 2 mg  try To gradually wean down Ativan  As per son she has been having increasing loneliness anxiety and crying and at her outpatient psychiatrist has tried other medications which did not work and then he was increasing [address]  Concerned that that might be giving her some side effects of dizziness and weakness  Will ask psychiatry for evaluation while here  Patient denies any suicidal or homicidal ideation  needs outpatient psych follow up  · PDMP reviewed, no red flags

## 2023-04-24 NOTE — ASSESSMENT & PLAN NOTE
· Patient is complaining of intermittent episodes of diarrhea for the last 3 weeks  · No diarrhea noted to check stool cultures  · egd done which shows multiple gastric polyps which were biopsied  · Episode of vomiting today and got a little diaphoretic with brown vomitus noted    Check cbc, obstruction series placed on IV Protonix and IV Zofran and monitor for now discharge for today canceled to SNF

## 2023-04-24 NOTE — DISCHARGE SUMMARY
[address]  Discharge- Amelie Felty 1950, 72 y o  female MRN: [id number]  Unit/Bed#: -01 Encounter: [id number]  Primary Care Provider: Derek Carrington MD   Date and time admitted to hospital: 4/19/2023  2:50 PM    * Syncope  Assessment & Plan  · Presents after syncopal episode, patient does not recall any events and fell down 12 wooden steps sustaining scalp laceration  · Son at bedside reports 3 weeks of diarrhea-patient states history of C  difficile but this is yellow without the foul odor, unlike her previous C  difficile episode  · Monitored on telemetry for 24 hours  No arrythmia noted by previous provider telemetry has since been discontinued  · Orthostatic BP appear negative  · Losartan and hydrochlorothiazide on hold  · Scalp laceration repaired by ED-neurochecks x24 hours, CT brain no acute normality  · Pt/ot recommend subacute rehab  · MRI brain obtained: no evidence of acute CVA, hemorrhage or mass   · Continue Lipitor   · Patient has psychosomatic symptoms due to her bipolar depression    Diarrhea of presumed infectious origin  Assessment & Plan  · Patient is complaining of intermittent episodes of diarrhea for the last 3 weeks  · No diarrhea noted to check stool cultures  · egd done which shows multiple gastric polyps which were biopsied    Scalp laceration  Assessment & Plan  · S/p syncopal event falling down wooden steps  · Sustained scalp laceration  · Repaired with staple and Dermabond  · Appreciate wound care consultation  · Pain control with Tylenol    Primary hypertension  Assessment & Plan  · PTA maintained on losartan 50 mg daily and hydrochlorothiazide 12 5 at home  · Syncopal episode with suspected orthostatic hypotension  Likely hypovolemia in setting of diarrhea at home    · BP normotensive last orthostatic bp negative   · Cont to hold losartan and hctz    Bipolar depression ([facility] [address] )  Assessment & Plan  · Follows closely with outpatient psychiatrist  · Continue PTA Lamictal 200 mg twice daily  ·  PTA lorazepam 1 mg in the a m  and afternoon, 4 mg at at bedtime  decrease bedtime dose to 2 mg  try To gradually wean down Ativan  As per son she has been having increasing loneliness anxiety and crying and at her outpatient psychiatrist has tried other medications which did not work and then he was increasing [address] [facility]  Concerned that that might be giving her some side effects of dizziness and weakness  Will ask psychiatry for evaluation while here  Patient denies any suicidal or homicidal ideation  needs outpatient psych follow up  · PDMP reviewed, no red flags      Discharging Physician / Practitioner: Jaylin Fitzgerald MD  PCP: Migel Iraheta MD  Admission Date:   Admission Orders (From admission, onward)     Ordered        04/19/23 1632 [facility]  Once                      Discharge Date: 04/24/23    Medical Problems     Resolved Problems  Date Reviewed: 4/24/2023   None         Consultations During Hospital Stay:  · Psychiatry,gi    Procedures Performed:   · egd    Significant Findings / Test Results:   EGD    Result Date: 4/21/2023  Impression: Normal-appearing esophagus Multiple gastric polyps largest about 12 to 14 mm in size; 15 removed with cold snare polypectomy Biopsies taken to rule out H  pylori Normal-appearing duodenal bulb and visualized second portion of duodenum RECOMMENDATION:  Await pathology results  Return to floor for ongoing care as per primary team   May resume previously tolerated diet  Continue PPI once daily  Repeat EGD in 6 to 12 months  Follow-up with her primary gastroenterology team    Marianne Bourne MD     XR Trauma chest portable    Result Date: 4/19/2023  Impression: No acute cardiopulmonary disease  Workstation performed: [id number]     TRAUMA - CT head wo contrast    Result Date: 4/19/2023  Impression: No acute intracranial abnormality   Workstation performed: [id number]     TRAUMA - CT spine cervical wo contrast    Result Date: 4/19/2023  Impression: No cervical spine fracture or traumatic malalignment  Workstation performed: [id number]     MRI brain wo contrast    Result Date: 4/20/2023  Impression: No acute intracranial pathology  Chronic microangiopathy  Workstation performed: [id number]     XR Trauma pelvis ap only 1 or 2 vw    Result Date: 4/19/2023  Impression: No acute osseous abnormality  Workstation performed: [id number]     TRAUMA - CT chest abdomen pelvis w contrast    Result Date: 4/19/2023  Impression: No CT findings of trauma  Polypoid enhancing mass in the gastric body along the lateral wall in series 2 image 111 measuring 1 8 x 1 3 cm  Correlate with GI symptoms and GI consult  Workstation performed: [id number]     Incidental Findings:   · none    Test Results Pending at Discharge (will require follow up):   · egd biopsy results     Outpatient Tests Requested:  none  Complications:  none    Reason for Admission: Syncopal episode    Hospital Course: Greer Henning is a 72 y o  female patient who originally presented to the hospital on 4/19/2023 due to syncopal episode, generalized weakness and diarrhea  Patient has difficulty standing and ambulating  noted  To be on high dose of Ativan which I am gradually titrating down  No diarrhea reported during hospital stay  Did have EGD done with results as above biopsies are pending at this time  Patient does have a lot of psychosomatic complaints and symptoms  Needs close outpatient follow-up with psychiatry  requires physical therapy and Occupational Therapy as she is fall risk and is being sent to subacute rehab  Blood Pressure medicines also decreased secondary to concern for low blood pressure and orthostasis        Please see above list of diagnoses and related plan for additional information       Condition at Discharge: fair     Discharge Day Visit / Exam:     Subjective: Patient denies any chest pain or shortness of breath or abdominal pain "feels better today denies any diarrhea states her anxiety is okay however she does have episodes of anxiety throughout the day  Vitals: Blood Pressure: 138/91 (04/24/23 0800)  Pulse: 82 (04/24/23 0620)  Temperature: (!) 97 3 °F (36 3 °C) (04/24/23 0620)  Temp Source: Temporal (04/23/23 0810)  Respirations: 16 (04/24/23 0620)  Height: 5' 4\" (162 6 cm) (04/21/23 0908)  Weight - Scale: 75 8 kg (167 lb) (04/21/23 0908)  SpO2: 100 % (04/24/23 8965)  Exam:   Physical Exam  Vitals and nursing note reviewed  Constitutional:       Appearance: Normal appearance  HENT:      Head: Normocephalic and atraumatic  Right Ear: External ear normal       Left Ear: External ear normal       Nose: Nose normal       Mouth/Throat:      Pharynx: Oropharynx is clear  Eyes:      Pupils: Pupils are equal, round, and reactive to light  Cardiovascular:      Rate and Rhythm: Normal rate and regular rhythm  Heart sounds: Normal heart sounds  Pulmonary:      Effort: Pulmonary effort is normal       Breath sounds: Normal breath sounds  Abdominal:      General: Bowel sounds are normal       Palpations: Abdomen is soft  Tenderness: There is no abdominal tenderness  Musculoskeletal:         General: Normal range of motion  Cervical back: Normal range of motion and neck supple  Skin:     General: Skin is warm and dry  Capillary Refill: Capillary refill takes less than 2 seconds  Neurological:      Mental Status: She is alert  Mental status is at baseline  Comments: Oriented to person and place   Psychiatric:         Mood and Affect: Mood normal          Discussion with Family: updated son    Discharge instructions/Information to patient and family:   See after visit summary for information provided to patient and family  Provisions for Follow-Up Care:  See after visit summary for information related to follow-up care and any pertinent home health orders        Disposition:     Other Skilled Nursing " [facility] to [address]:   · Not Applicable to this Patient - Not Applicable to this Patient    Planned Readmission: none     Discharge Statement:  I spent 35 minutes discharging the patient  This time was spent on the day of discharge  I had direct contact with the patient on the day of discharge  Greater than 50% of the total time was spent examining patient, answering all patient questions, arranging and discussing plan of care with patient as well as directly providing post-discharge instructions  Additional time then spent on discharge activities  Discharge Medications:  See after visit summary for reconciled discharge medications provided to patient and family        ** Please Note: This note has been constructed using a voice recognition system **

## 2023-04-24 NOTE — PLAN OF CARE
Problem: OCCUPATIONAL THERAPY ADULT  Goal: Performs self-care activities at highest level of function for planned discharge setting  See evaluation for individualized goals  Description: Treatment Interventions: ADL retraining, Functional transfer training, UE strengthening/ROM, Endurance training, Patient/family training, Equipment evaluation/education, Neuromuscular reeducation, Compensatory technique education, Continued evaluation, Energy conservation, Activityengagement          See flowsheet documentation for full assessment, interventions and recommendations  Note: Limitation: Decreased ADL status, Decreased UE strength, Decreased Safe judgement during ADL, Decreased endurance, Decreased self-care trans, Decreased high-level ADLs  Prognosis: Good  Assessment: Pt completed OT tx session #2 focused on ADL performance and functional mobility  Pt alert and agreeable to participate  PT/OT co-treat completed d/t significant mobility deficits and safety concerns  Pt required increased assistance for ADLs this session d/t not feeling well  Pt currently completing UB ADLs @ S/set-up, LB ADLs @ Max A, and functional mobility with RW @ Min-Mod A with significant cues for safety  Pt demonstrating Good participation and Good potential to achieve goals but is currently demonstrating deficits in decrease activity tolerance, decrease standing balance, decrease performance during ADL tasks, decrease cognition, decrease safety awareness , increase impulsiveness, decrease generalized strength, decrease activity engagement and decrease performance during functional transfers  Continue to recommend post acute rehabilitation services upon discharge to increase safety and independence in ADL tasks and functional mobility       OT Discharge Recommendation: Post acute rehabilitation services

## 2023-04-24 NOTE — PLAN OF CARE
Problem: PHYSICAL THERAPY ADULT  Goal: Performs mobility at highest level of function for planned discharge setting  See evaluation for individualized goals  Description: Treatment/Interventions: Functional transfer training, LE strengthening/ROM, Elevations, Therapeutic exercise, Endurance training, Patient/family training, Equipment eval/education, Bed mobility, Gait training, Compensatory technique education, Spoke to nursing, OT  Equipment Recommended:  (pt owns RW)       See flowsheet documentation for full assessment, interventions and recommendations  Outcome: Not Progressing  Note: Prognosis: Guarded  Problem List: Decreased strength, Decreased endurance, Impaired balance, Decreased mobility, Decreased safety awareness  Assessment: Pt seen for PT treatment session this date with interventions consisting of gait training w/ emphasis on improving pt's ability to ambulate level surfaces x 15 ftx2 with min A of 2 and max A of 2 provided by therapist with RW and therapeutic activity consisting of training: bed mobility, supine<>sit transfers, sit<>stand transfers, stand pivot transfers towards B direction and toilet transfer  Pt agreeable to PT treatment session upon arrival, pt found supine in bed w/ HOB elevated, responsive  In comparison to previous session, pt with no improvements as evidenced by pt limited by fatigue, requiring assistx1-2 for safety and mobility  Post session: pt returned BTB, bed alarm engaged, all needs in reach and RN notified of session findings/recommendations  Continue to recommend post acute rehabilitation services at time of d/c in order to maximize pt's functional independence and safety w/ mobility  Pt continues to be functioning below baseline level  PT will continue to see pt during current hospitalization in order to address the deficits listed above and provide interventions consistent w/ POC in effort to achieve STGs    Barriers to Discharge: Decreased caregiver support  Barriers to Discharge Comments: Pt lives alone, limited support  PT Discharge Recommendation: Post acute rehabilitation services    See flowsheet documentation for full assessment

## 2023-04-24 NOTE — ASSESSMENT & PLAN NOTE
· S/p syncopal event falling down wooden steps  · Sustained scalp laceration  · Repaired with staple and Dermabond  · Appreciate wound care consultation  · Pain control with Tylenol

## 2023-04-25 PROBLEM — K92.1 HEMATOCHEZIA: Status: ACTIVE | Noted: 2023-04-25

## 2023-04-25 LAB
ANION GAP SERPL CALCULATED.3IONS-SCNC: 9 MMOL/L (ref 4–13)
BUN SERPL-MCNC: 27 MG/DL (ref 5–25)
CALCIUM SERPL-MCNC: 9.1 MG/DL (ref 8.4–10.2)
CHLORIDE SERPL-SCNC: 109 MMOL/L (ref 96–108)
CO2 SERPL-SCNC: 22 MMOL/L (ref 21–32)
CREAT SERPL-MCNC: 0.86 MG/DL (ref 0.6–1.3)
ERYTHROCYTE [DISTWIDTH] IN BLOOD BY AUTOMATED COUNT: 15.3 % (ref 11.6–15.1)
GFR SERPL CREATININE-BSD FRML MDRD: 68 ML/MIN/1.73SQ M
GLUCOSE SERPL-MCNC: 92 MG/DL (ref 65–140)
HCT VFR BLD AUTO: 33.3 % (ref 34.8–46.1)
HCT VFR BLD AUTO: 36.5 % (ref 34.8–46.1)
HGB BLD-MCNC: 10.7 G/DL (ref 11.5–15.4)
HGB BLD-MCNC: 11.3 G/DL (ref 11.5–15.4)
HGB BLD-MCNC: 11.7 G/DL (ref 11.5–15.4)
MCH RBC QN AUTO: 29.6 PG (ref 26.8–34.3)
MCHC RBC AUTO-ENTMCNC: 32.1 G/DL (ref 31.4–37.4)
MCV RBC AUTO: 92 FL (ref 82–98)
PLATELET # BLD AUTO: 340 THOUSANDS/UL (ref 149–390)
PMV BLD AUTO: 9.1 FL (ref 8.9–12.7)
POTASSIUM SERPL-SCNC: 4 MMOL/L (ref 3.5–5.3)
RBC # BLD AUTO: 3.95 MILLION/UL (ref 3.81–5.12)
SODIUM SERPL-SCNC: 140 MMOL/L (ref 135–147)
WBC # BLD AUTO: 8.87 THOUSAND/UL (ref 4.31–10.16)

## 2023-04-25 RX ORDER — POLYETHYLENE GLYCOL 3350 17 G/17G
17 POWDER, FOR SOLUTION ORAL DAILY
Status: DISCONTINUED | OUTPATIENT
Start: 2023-04-25 | End: 2023-04-27 | Stop reason: HOSPADM

## 2023-04-25 RX ADMIN — Medication 6 MG: at 21:04

## 2023-04-25 RX ADMIN — MECLIZINE HYDROCHLORIDE 25 MG: 25 TABLET ORAL at 14:01

## 2023-04-25 RX ADMIN — PANTOPRAZOLE SODIUM 40 MG: 40 INJECTION, POWDER, FOR SOLUTION INTRAVENOUS at 21:04

## 2023-04-25 RX ADMIN — LORAZEPAM 2 MG: 1 TABLET ORAL at 21:04

## 2023-04-25 RX ADMIN — BISACODYL 10 MG: 10 SUPPOSITORY RECTAL at 08:58

## 2023-04-25 RX ADMIN — MECLIZINE HYDROCHLORIDE 25 MG: 25 TABLET ORAL at 05:33

## 2023-04-25 RX ADMIN — DOCUSATE SODIUM AND SENNOSIDES 1 TABLET: 8.6; 5 TABLET, FILM COATED ORAL at 21:04

## 2023-04-25 RX ADMIN — LAMOTRIGINE 200 MG: 100 TABLET ORAL at 16:52

## 2023-04-25 RX ADMIN — LORAZEPAM 1 MG: 1 TABLET ORAL at 14:01

## 2023-04-25 RX ADMIN — LAMOTRIGINE 200 MG: 100 TABLET ORAL at 08:58

## 2023-04-25 RX ADMIN — ATORVASTATIN CALCIUM 40 MG: 40 TABLET, FILM COATED ORAL at 08:58

## 2023-04-25 RX ADMIN — MECLIZINE HYDROCHLORIDE 25 MG: 25 TABLET ORAL at 21:04

## 2023-04-25 RX ADMIN — LORAZEPAM 1 MG: 1 TABLET ORAL at 08:58

## 2023-04-25 RX ADMIN — PANTOPRAZOLE SODIUM 40 MG: 40 INJECTION, POWDER, FOR SOLUTION INTRAVENOUS at 08:58

## 2023-04-25 NOTE — PROGRESS NOTES
[address]  Progress Note  Name: Linda Becerril  MRN: [id number]  Unit/Bed#: -01 I Date of Admission: 4/19/2023   Date of Service: 4/25/2023 I Hospital Day: 6    Assessment/Plan   Syncope  Assessment & Plan  · Presents after syncopal episode, patient does not recall any events and fell down 12 wooden steps sustaining scalp laceration  · Son at bedside reports 3 weeks of diarrhea-patient states history of C  difficile but this is yellow without the foul odor, unlike her previous C  difficile episode  · Monitored on telemetry for 24 hours  No arrythmia noted by previous provider telemetry has since been discontinued  · Orthostatic BP appear negative  · Losartan and hydrochlorothiazide on hold  · Scalp laceration repaired by ED-neurochecks x24 hours, CT brain no acute normality  · Pt/ot recommend subacute rehab  · MRI brain obtained: no evidence of acute CVA, hemorrhage or mass   · Continue Lipitor   · Patient has psychosomatic symptoms due to her bipolar depression    * Hematochezia  Assessment & Plan  Could be mild oozing due to recent gastric polypectomy  Hemoglobin remained stable  May use glycerin suppository prior to manual disimpaction  Keep patient n p o  after midnight, if patient hemoglobin drop or patient and urgent procedure  GI consult appreciated  Continue PPI    Diarrhea of presumed infectious origin  Assessment & Plan  · Patient is complaining of intermittent episodes of diarrhea for the last 3 weeks  · No diarrhea noted to check stool cultures  · egd done which shows multiple gastric polyps which were biopsied  · Episode of vomiting today and got a little diaphoretic with brown vomitus noted    Check cbc, obstruction series placed on IV Protonix and IV Zofran and monitor for now discharge for today canceled to SNF    Severe protein-calorie malnutrition [facility])  Assessment & Plan  Malnutrition Findings:   Adult Malnutrition type: Chronic illness  Adult Degree of Malnutrition: Other severe protein calorie malnutrition  Malnutrition Characteristics: Inadequate energy, Weight loss                  360 Statement: Chronic severe malnutrition related to depression, decreased appetite, recent diarrhea and nausea as evidenced by < 75% estimated energy intake > 1 mo, 13 5% weight loss x 7 mo ( 9/12/22 193lb, 2/9/22 177lb, 4/19/23 167lb)  Treated with: Advance diet as medically appropriate to lactose free, low fiber diet per GI recommendations  Recommend daily weights for nutrition monitoring  Will add ensure plus high PRO BID which is lactose free  BMI Findings: Body mass index is 28 67 kg/m²  Gastric polyp  Assessment & Plan  · Gastric polyp, incidental finding on CT abdomen of pelvis during trauma work-up of 1 8 and 1 3 cm along the lateral wall  · Extensive history of esophagitis  · Continue PTA Protonix  · GI consulted recommended  · EGD completed polyps removed and sent for exam  Out patient follow up  · Recommend clear liquid diet, keep patient n p o  after midnight in case patient needs to proceed with EGD      Primary hypertension  Assessment & Plan  · PTA maintained on losartan 50 mg daily and hydrochlorothiazide 12 5 at home  · Syncopal episode with suspected orthostatic hypotension  Likely hypovolemia in setting of diarrhea at home  · BP normotensive last orthostatic bp negative   · Cont to hold losartan and hctz    Bipolar depression (HCC)  Assessment & Plan  · Follows closely with outpatient psychiatrist  · Continue PTA Lamictal 200 mg twice daily  ·  PTA lorazepam 1 mg in the a m  and afternoon, 4 mg at at bedtime  decrease bedtime dose to 2 mg  try To gradually wean down Ativan  As per son she has been having increasing loneliness anxiety and crying and at her outpatient psychiatrist has tried other medications which did not work and then he was increasing [address]    Concerned that that might be giving her some side effects of dizziness and weakness  Will ask psychiatry for evaluation while here  Patient denies any suicidal or homicidal ideation  needs outpatient psych follow up  · PDMP reviewed, no red flags    Scalp laceration  Assessment & Plan  · S/p syncopal event falling down wooden steps  · Sustained scalp laceration  · Repaired with staple and Dermabond  · Appreciate wound care consultation  · Pain control with Tylenol             VTE Pharmacologic Prophylaxis: VTE Score: 5 Anticoagulation remains on hold    Patient Centered Rounds: I performed bedside rounds with nursing staff today  Discussions with Specialists or Other Care Team Provider: Gastroenterology    Education and Discussions with Family / Patient: Updated  (son) via phone  Total Time Spent on Date of Encounter in care of patient: 10 minutes This time was spent on one or more of the following: performing physical exam; counseling and coordination of care; obtaining or reviewing history; documenting in the medical record; reviewing/ordering tests, medications or procedures; communicating with other healthcare professionals and discussing with patient's family/caregivers  Current Length of Stay: 6 day(s)  Current Patient Status: Inpatient   Certification Statement: The patient will continue to require additional inpatient hospital stay due to To monitor above conditions  Discharge Plan: Anticipate discharge in 24-48 hrs to home  Code Status: Level 1 - Full Code    Subjective:   Seen and evaluated during the event  Resting comfortably  Denies any significant complaint  Objective:     Vitals:   Temp (24hrs), Av °F (36 7 °C), Min:97 7 °F (36 5 °C), Max:98 2 °F (36 8 °C)    Temp:  [97 7 °F (36 5 °C)-98 2 °F (36 8 °C)] 98 2 °F (36 8 °C)  HR:  [] 84  Resp:  [18-20] 18  BP: (101-133)/(56-81) 120/75  SpO2:  [88 %-96 %] 94 %  Body mass index is 28 67 kg/m²  Input and Output Summary (last 24 hours):      Intake/Output Summary (Last 24 hours) at 2023 [address] filed at 4/25/2023 1116  Gross per 24 hour   Intake --   Output 300 ml   Net -300 ml       Physical Exam:   Physical Exam  Vitals reviewed  Constitutional:       Appearance: Normal appearance  She is not ill-appearing  HENT:      Head: Normocephalic  Nose: No congestion  Mouth/Throat:      Mouth: Mucous membranes are moist       Pharynx: Oropharynx is clear  No oropharyngeal exudate  Eyes:      General: No scleral icterus  Left eye: No discharge  Extraocular Movements: Extraocular movements intact  Conjunctiva/sclera: Conjunctivae normal       Pupils: Pupils are equal, round, and reactive to light  Cardiovascular:      Rate and Rhythm: Normal rate  Pulses: Normal pulses  Heart sounds: No murmur heard  No gallop  Pulmonary:      Effort: Pulmonary effort is normal  No respiratory distress  Breath sounds: No stridor  No wheezing or rhonchi  Abdominal:      General: Abdomen is flat  Bowel sounds are normal  There is no distension  Palpations: There is no mass  Tenderness: There is no abdominal tenderness  Hernia: No hernia is present  Musculoskeletal:         General: No swelling, tenderness, deformity or signs of injury  Normal range of motion  Skin:     General: Skin is warm  Neurological:      General: No focal deficit present  Mental Status: She is alert and oriented to person, place, and time  Cranial Nerves: No cranial nerve deficit  Sensory: No sensory deficit  Motor: No weakness        Coordination: Coordination normal          Additional Data:     Labs:  Results from last 7 days   Lab Units 04/25/23  0526 04/20/23  0535 04/19/23  1525   WBC Thousand/uL 8 87   < > 11 63*   HEMOGLOBIN g/dL 11 7   < > 14 4   HEMATOCRIT % 36 5   < > 44 1   PLATELETS Thousands/uL 340   < > 331   NEUTROS PCT %  --   --  82*   LYMPHS PCT %  --   --  11*   MONOS PCT %  --   --  6   EOS PCT %  --   --  0    < > = values in this interval not displayed  Results from last 7 days   Lab Units 04/25/23  0526 04/20/23  0535 04/19/23  1525   SODIUM mmol/L 140   < > 142   POTASSIUM mmol/L 4 0   < > 3 3*   CHLORIDE mmol/L 109*   < > 104   CO2 mmol/L 22   < > 27   BUN mg/dL 27*   < > 9   CREATININE mg/dL 0 86   < > 0 90   ANION GAP mmol/L 9   < > 11   CALCIUM mg/dL 9 1   < > 9 4   ALBUMIN g/dL  --   --  4 3   TOTAL BILIRUBIN mg/dL  --   --  0 81   ALK PHOS U/L  --   --  97   ALT U/L  --   --  36   AST U/L  --   --  22   GLUCOSE RANDOM mg/dL 92   < > 117    < > = values in this interval not displayed  Results from last 7 days   Lab Units 04/19/23  1525   INR  1 06     Results from last 7 days   Lab Units 04/24/23  1152 04/19/23  1539   POC GLUCOSE mg/dl 128 109               Lines/Drains:  Invasive Devices     Peripheral Intravenous Line  Duration           Peripheral IV 04/23/23 Right;Ventral (anterior) Forearm 2 days          Drain  Duration           External Urinary Catheter 3 days                      Imaging: No pertinent imaging reviewed      Recent Cultures (last 7 days):         Last 24 Hours Medication List:   Current Facility-Administered Medications   Medication Dose Route Frequency Provider Last Rate   • acetaminophen  650 mg Oral Q6H PRN Asya S Molly, CRNP     • atorvastatin  40 mg Oral Daily Asya S Molly, CRNP     • bisacodyl  10 mg Rectal Daily Yazmin Velásquez MD     • glycerin (adult)  1 suppository Rectal Daily PRN Willie Anne MD     • lamoTRIgine  200 mg Oral BID Asya S Molly, CRNP     • LORazepam  1 mg Oral BID Asya S Molly, CRNP     • LORazepam  2 mg Oral HS Yazmin Velásquez MD     • meclizine  25 mg Oral [facility] Yazmin Velásquez MD     • melatonin  6 mg Oral HS ASHVIN Dsouza     • ondansetron  4 mg Intravenous Q6H PRN Yazmin Velásquez MD     • oxyCODONE  5 mg Oral Q6H PRN Asya S Molly, CRNP     • pantoprazole  40 mg Intravenous Q12H [address] Yazmin Velásquez MD     • senna-docusate sodium  1 tablet Oral HS Concepcion Emerald Radha Lechuga MD          Today, Patient Was Seen By: Ana Ferrari MD    **Please Note: This note may have been constructed using a voice recognition system  **

## 2023-04-25 NOTE — ASSESSMENT & PLAN NOTE
· Gastric polyp, incidental finding on CT abdomen of pelvis during trauma work-up of 1 8 and 1 3 cm along the lateral wall  · Extensive history of esophagitis  · Continue PTA Protonix  · GI consulted recommended  · EGD completed polyps removed and sent for exam  Out patient follow up  · Recommend clear liquid diet, keep patient n p o  after midnight in case patient needs to proceed with EGD

## 2023-04-25 NOTE — PROGRESS NOTES
" [facility]  Progress Note - Brianda Singh 72 y o  female MRN: [id number]    Unit/Bed#: -[place] Encounter: [id number]    Assessment/Plan:  Gastric polyps  Constipation  Fecal impaction  Hematochezia  -She underwent EGD 4/21/2023 for evaluation of gastric mass seen on CT scan found to have multiple large gastric polyps that were greater than 1 cm 15 of which were removed likely representing fundic gland or hyperplastic polyps pathology still pending; biopsies for H  pylori are also pending at that time  -Patient ultimately was able to tolerate a regular diet  -Re-consulted due to patient having an episode of brown emesis and some maroon stool  -Hemoglobin stable today at 11 7, vital signs stable  -Elevated BUN  -Low suspicion for active high-volume GI bleeding however she may be having some oozing from her gastric polypectomy sites  -Her hematochezia is likely due to recent disimpaction and have low suspicion for brisk upper GI bleed  -Last bowel movement reported on 4/24 at 2010 loose, mucousy, red-brown    Plan:  -Monitor hemoglobin every 12 hours  -Advance to clear liquid diet and keep n p o  after midnight in case patient's clinical status warrants EGD  -Continue with PPI every 12 hours  -Rest of bowel regimen and consider glycerin suppositories in light of her disimpaction    Subjective:   Patient seen and examined  No abdominal pain  Denies any current nausea or vomiting  Objective:     Vitals: Blood pressure 122/72, pulse 97, temperature 97 9 °F (36 6 °C), resp  rate 18, height 5' 4\" (1 626 m), weight 75 8 kg (167 lb), SpO2 96 %  ,Body mass index is 28 67 kg/m²  No intake or output data in the 24 hours ending 04/25/23 6049    Review of Systems: as per HPI  Review of Systems    Physical Exam:     Physical Exam  Constitutional:       General: She is not in acute distress  HENT:      Head: Normocephalic  Eyes:      General: No scleral icterus    Cardiovascular:      Rate and " Rhythm: Normal rate  Pulmonary:      Effort: Pulmonary effort is normal    Abdominal:      General: There is no distension  Palpations: Abdomen is soft  Tenderness: There is no abdominal tenderness  There is no guarding  Musculoskeletal:         General: No swelling  Cervical back: Neck supple  Skin:     Coloration: Skin is not jaundiced  Neurological:      Mental Status: She is alert and oriented to person, place, and time     Psychiatric:         Mood and Affect: Mood normal            Invasive Devices     Peripheral Intravenous Line  Duration           Peripheral IV 04/23/23 Right;Ventral (anterior) Forearm 2 days          Drain  Duration           External Urinary Catheter 3 days                        CBC:   Lab Results   Component Value Date    WBC 8 87 04/25/2023    HGB 11 7 04/25/2023    HCT 36 5 04/25/2023    MCV 92 04/25/2023     04/25/2023    MCH 29 6 04/25/2023    MCHC 32 1 04/25/2023    RDW 15 3 (H) 04/25/2023    MPV 9 1 04/25/2023   ,   CMP:   Lab Results   Component Value Date    K 4 0 04/25/2023     (H) 04/25/2023    CO2 22 04/25/2023    BUN 27 (H) 04/25/2023    CREATININE 0 86 04/25/2023    CALCIUM 9 1 04/25/2023    EGFR 68 04/25/2023

## 2023-04-25 NOTE — ASSESSMENT & PLAN NOTE
· Follows closely with outpatient psychiatrist  · Continue PTA Lamictal 200 mg twice daily  ·  PTA lorazepam 1 mg in the a m  and afternoon, 4 mg at at bedtime  decrease bedtime dose to 2 mg  try To gradually wean down Ativan  As per son she has been having increasing loneliness anxiety and crying and at her outpatient psychiatrist has tried other medications which did not work and then he was increasing [address]  Concerned that that might be giving her some side effects of dizziness and weakness  Will ask psychiatry for evaluation while here  Patient denies any suicidal or homicidal ideation  needs outpatient psych follow up  · PDMP reviewed, no red flags

## 2023-04-25 NOTE — PLAN OF CARE
Problem: PAIN - ADULT  Goal: Verbalizes/displays adequate comfort level or baseline comfort level  Description: Interventions:  - Encourage patient to monitor pain and request assistance  - Assess pain using appropriate pain scale  - Administer analgesics based on type and severity of pain and evaluate response  - Implement non-pharmacological measures as appropriate and evaluate response  - Consider cultural and social influences on pain and pain management  - Notify physician/advanced practitioner if interventions unsuccessful or patient reports new pain  Outcome: Progressing     Problem: SAFETY ADULT  Goal: Maintain or return to baseline ADL function  Description: INTERVENTIONS:  - Educate patient/family on patient safety including physical limitations  - Instruct patient to call for assistance with activity   - Consult OT/PT to assist with strengthening/mobility   - Keep Call bell within reach  - Keep bed low and locked with side rails adjusted as appropriate  - Keep care items and personal belongings within reach  - Initiate and maintain comfort rounds  - Make Fall Risk Sign visible to staff  - Offer Toileting every 2 Hours, in advance of need  - Initiate/Maintain bed/chair alarm  - Apply yellow socks and bracelet for high fall risk patients  - Consider moving patient to room near nurses station  Outcome: Progressing     Problem: DISCHARGE PLANNING  Goal: Discharge to home or other facility with appropriate resources  Description: INTERVENTIONS:  - Identify barriers to discharge w/patient and caregiver  - Arrange for needed discharge resources and transportation as appropriate  - Identify discharge learning needs (meds, wound care, etc )  - Arrange for interpretive services to assist at discharge as needed  - Refer to Case Management Department for coordinating discharge planning if the patient needs post-hospital services based on physician/advanced practitioner order or complex needs related to functional status, cognitive ability, or social support system  Outcome: Progressing     Problem: Knowledge Deficit  Goal: Patient/family/caregiver demonstrates understanding of disease process, treatment plan, medications, and discharge instructions  Description: Complete learning assessment and assess knowledge base    Interventions:  - Provide teaching at level of understanding  - Provide teaching via preferred learning methods  Outcome: Progressing     Problem: MOBILITY - ADULT  Goal: Maintain or return to baseline ADL function  Description: INTERVENTIONS:  - Educate patient/family on patient safety including physical limitations  - Instruct patient to call for assistance with activity   - Consult OT/PT to assist with strengthening/mobility   - Keep Call bell within reach  - Keep bed low and locked with side rails adjusted as appropriate  - Keep care items and personal belongings within reach  - Initiate and maintain comfort rounds  - Make Fall Risk Sign visible to staff  - Offer Toileting every 2 Hours, in advance of need  - Initiate/Maintain bed/chair alarm  - Apply yellow socks and bracelet for high fall risk patients  - Consider moving patient to room near nurses station  Outcome: Progressing

## 2023-04-25 NOTE — PLAN OF CARE
Problem: Potential for Falls  Goal: Patient will remain free of falls  Description: INTERVENTIONS:  - Educate patient/family on patient safety including physical limitations  - Instruct patient to call for assistance with activity   - Consult OT/PT to assist with strengthening/mobility   - Keep Call bell within reach  - Keep bed low and locked with side rails adjusted as appropriate  - Keep care items and personal belongings within reach  - Initiate and maintain comfort rounds  - Make Fall Risk Sign visible to staff  - Offer Toileting every 2 Hours, in advance of need  - Initiate/Maintain bed/chair alarm  - Apply yellow socks and bracelet for high fall risk patients  - Consider moving patient to room near nurses station  Outcome: Progressing     Problem: PAIN - ADULT  Goal: Verbalizes/displays adequate comfort level or baseline comfort level  Description: Interventions:  - Encourage patient to monitor pain and request assistance  - Assess pain using appropriate pain scale  - Administer analgesics based on type and severity of pain and evaluate response  - Implement non-pharmacological measures as appropriate and evaluate response  - Consider cultural and social influences on pain and pain management  - Notify physician/advanced practitioner if interventions unsuccessful or patient reports new pain  Outcome: Progressing     Problem: INFECTION - ADULT  Goal: Absence or prevention of progression during hospitalization  Description: INTERVENTIONS:  - Assess and monitor for signs and symptoms of infection  - Monitor lab/diagnostic results  - Monitor all insertion sites, i e  indwelling lines, tubes, and drains  - Monitor endotracheal if appropriate and nasal secretions for changes in amount and color  - [place] appropriate cooling/warming therapies per order  - Administer medications as ordered  - Instruct and encourage patient and family to use good hand hygiene technique  - Identify and instruct in appropriate isolation precautions for identified infection/condition  Outcome: Progressing     Problem: SAFETY ADULT  Goal: Patient will remain free of falls  Description: INTERVENTIONS:  - Educate patient/family on patient safety including physical limitations  - Instruct patient to call for assistance with activity   - Consult OT/PT to assist with strengthening/mobility   - Keep Call bell within reach  - Keep bed low and locked with side rails adjusted as appropriate  - Keep care items and personal belongings within reach  - Initiate and maintain comfort rounds  - Make Fall Risk Sign visible to staff  - Offer Toileting every 2 Hours, in advance of need  - Initiate/Maintain bed/chair alarm  - Apply yellow socks and bracelet for high fall risk patients  - Consider moving patient to room near nurses station  Outcome: Progressing  Goal: Maintain or return to baseline ADL function  Description: INTERVENTIONS:  - Educate patient/family on patient safety including physical limitations  - Instruct patient to call for assistance with activity   - Consult OT/PT to assist with strengthening/mobility   - Keep Call bell within reach  - Keep bed low and locked with side rails adjusted as appropriate  - Keep care items and personal belongings within reach  - Initiate and maintain comfort rounds  - Make Fall Risk Sign visible to staff  - Offer Toileting every 2 Hours, in advance of need  - Initiate/Maintain bed/chair alarm  - Apply yellow socks and bracelet for high fall risk patients  - Consider moving patient to room near nurses station  Outcome: Progressing  Goal: Maintains/Returns to pre admission functional level  Description: INTERVENTIONS:  - Educate patient/family on patient safety including physical limitations  - Instruct patient to call for assistance with activity   - Consult OT/PT to assist with strengthening/mobility   - Keep Call bell within reach  - Keep bed low and locked with side rails adjusted as appropriate  - Keep care items and personal belongings within reach  - Initiate and maintain comfort rounds  - Make Fall Risk Sign visible to staff  - Offer Toileting every 2 Hours, in advance of need  - Initiate/Maintain bed/chair alarm  - Apply yellow socks and bracelet for high fall risk patients  - Consider moving patient to room near nurses station  Outcome: Progressing     Problem: DISCHARGE PLANNING  Goal: Discharge to home or other facility with appropriate resources  Description: INTERVENTIONS:  - Identify barriers to discharge w/patient and caregiver  - Arrange for needed discharge resources and transportation as appropriate  - Identify discharge learning needs (meds, wound care, etc )  - Arrange for interpretive services to assist at discharge as needed  - Refer to Case Management Department for coordinating discharge planning if the patient needs post-hospital services based on physician/advanced practitioner order or complex needs related to functional status, cognitive ability, or social support system  Outcome: Progressing     Problem: Knowledge Deficit  Goal: Patient/family/caregiver demonstrates understanding of disease process, treatment plan, medications, and discharge instructions  Description: Complete learning assessment and assess knowledge base    Interventions:  - Provide teaching at level of understanding  - Provide teaching via preferred learning methods  Outcome: Progressing     Problem: MOBILITY - ADULT  Goal: Maintain or return to baseline ADL function  Description: INTERVENTIONS:  - Educate patient/family on patient safety including physical limitations  - Instruct patient to call for assistance with activity   - Consult OT/PT to assist with strengthening/mobility   - Keep Call bell within reach  - Keep bed low and locked with side rails adjusted as appropriate  - Keep care items and personal belongings within reach  - Initiate and maintain comfort rounds  - Make Fall Risk Sign visible to staff  - Offer Toileting every 2 Hours, in advance of need  - Initiate/Maintain bed/chair alarm  - Apply yellow socks and bracelet for high fall risk patients  - Consider moving patient to room near nurses station  Outcome: Progressing  Goal: Maintains/Returns to pre admission functional level  Description: INTERVENTIONS:  - Educate patient/family on patient safety including physical limitations  - Instruct patient to call for assistance with activity   - Consult OT/PT to assist with strengthening/mobility   - Keep Call bell within reach  - Keep bed low and locked with side rails adjusted as appropriate  - Keep care items and personal belongings within reach  - Initiate and maintain comfort rounds  - Make Fall Risk Sign visible to staff  - Offer Toileting every 2 Hours, in advance of need  - Initiate/Maintain bed/chair alarm  - Apply yellow socks and bracelet for high fall risk patients  - Consider moving patient to room near nurses station  Outcome: Progressing     Problem: Nutrition/Hydration-ADULT  Goal: Nutrient/Hydration intake appropriate for improving, restoring or maintaining nutritional needs  Description: Monitor and assess patient's nutrition/hydration status for malnutrition  Collaborate with interdisciplinary team and initiate plan and interventions as ordered  Monitor patient's weight and dietary intake as ordered or per policy  Utilize nutrition screening tool and intervene as necessary  Determine patient's food preferences and provide high-protein, high-caloric foods as appropriate       INTERVENTIONS:  - Monitor oral intake, urinary output, labs, and treatment plans  - Assess nutrition and hydration status and recommend course of action  - Evaluate amount of meals eaten  - Assist patient with eating if necessary   - Allow adequate time for meals  - Recommend/ encourage appropriate diets, oral nutritional supplements, and vitamin/mineral supplements  - Order, calculate, and assess calorie counts as needed  - Recommend, monitor, and adjust tube feedings and TPN/PPN based on assessed needs  - Assess need for intravenous fluids  - Provide specific nutrition/hydration education as appropriate  - Include patient/family/caregiver in decisions related to nutrition  Outcome: Progressing

## 2023-04-25 NOTE — ASSESSMENT & PLAN NOTE
Could be mild oozing due to recent gastric polypectomy  Hemoglobin remained stable  May use glycerin suppository prior to manual disimpaction  Keep patient n p o  after midnight, if patient hemoglobin drop or patient and urgent procedure  GI consult appreciated  Continue PPI

## 2023-04-26 LAB
ANION GAP SERPL CALCULATED.3IONS-SCNC: 6 MMOL/L (ref 4–13)
BUN SERPL-MCNC: 17 MG/DL (ref 5–25)
CALCIUM SERPL-MCNC: 8.7 MG/DL (ref 8.4–10.2)
CHLORIDE SERPL-SCNC: 109 MMOL/L (ref 96–108)
CO2 SERPL-SCNC: 26 MMOL/L (ref 21–32)
CREAT SERPL-MCNC: 0.86 MG/DL (ref 0.6–1.3)
FLUAV RNA RESP QL NAA+PROBE: NEGATIVE
FLUBV RNA RESP QL NAA+PROBE: NEGATIVE
GFR SERPL CREATININE-BSD FRML MDRD: 68 ML/MIN/1.73SQ M
GLUCOSE SERPL-MCNC: 90 MG/DL (ref 65–140)
HCT VFR BLD AUTO: 34.7 % (ref 34.8–46.1)
HCT VFR BLD AUTO: 35.4 % (ref 34.8–46.1)
HGB BLD-MCNC: 11.2 G/DL (ref 11.5–15.4)
HGB BLD-MCNC: 11.4 G/DL (ref 11.5–15.4)
POTASSIUM SERPL-SCNC: 3.8 MMOL/L (ref 3.5–5.3)
RSV RNA RESP QL NAA+PROBE: NEGATIVE
SARS-COV-2 RNA RESP QL NAA+PROBE: NEGATIVE
SODIUM SERPL-SCNC: 141 MMOL/L (ref 135–147)

## 2023-04-26 RX ORDER — SODIUM CHLORIDE 9 MG/ML
125 INJECTION, SOLUTION INTRAVENOUS CONTINUOUS
Status: DISCONTINUED | OUTPATIENT
Start: 2023-04-27 | End: 2023-04-27 | Stop reason: HOSPADM

## 2023-04-26 RX ORDER — SODIUM CHLORIDE 9 MG/ML
125 INJECTION, SOLUTION INTRAVENOUS CONTINUOUS
Status: DISCONTINUED | OUTPATIENT
Start: 2023-04-26 | End: 2023-04-26

## 2023-04-26 RX ORDER — LOSARTAN POTASSIUM 25 MG/1
25 TABLET ORAL DAILY
Status: DISCONTINUED | OUTPATIENT
Start: 2023-04-26 | End: 2023-04-27 | Stop reason: HOSPADM

## 2023-04-26 RX ADMIN — ATORVASTATIN CALCIUM 40 MG: 40 TABLET, FILM COATED ORAL at 09:03

## 2023-04-26 RX ADMIN — MECLIZINE HYDROCHLORIDE 25 MG: 25 TABLET ORAL at 05:20

## 2023-04-26 RX ADMIN — LORAZEPAM 1 MG: 1 TABLET ORAL at 09:04

## 2023-04-26 RX ADMIN — LORAZEPAM 2 MG: 1 TABLET ORAL at 21:02

## 2023-04-26 RX ADMIN — LAMOTRIGINE 200 MG: 100 TABLET ORAL at 09:04

## 2023-04-26 RX ADMIN — DOCUSATE SODIUM AND SENNOSIDES 1 TABLET: 8.6; 5 TABLET, FILM COATED ORAL at 21:02

## 2023-04-26 RX ADMIN — MECLIZINE HYDROCHLORIDE 25 MG: 25 TABLET ORAL at 13:41

## 2023-04-26 RX ADMIN — POLYETHYLENE GLYCOL 3350 17 G: 17 POWDER, FOR SOLUTION ORAL at 09:04

## 2023-04-26 RX ADMIN — LOSARTAN POTASSIUM 25 MG: 25 TABLET, FILM COATED ORAL at 13:43

## 2023-04-26 RX ADMIN — MECLIZINE HYDROCHLORIDE 25 MG: 25 TABLET ORAL at 21:02

## 2023-04-26 RX ADMIN — PANTOPRAZOLE SODIUM 40 MG: 40 INJECTION, POWDER, FOR SOLUTION INTRAVENOUS at 09:04

## 2023-04-26 RX ADMIN — Medication 6 MG: at 21:02

## 2023-04-26 RX ADMIN — LAMOTRIGINE 200 MG: 100 TABLET ORAL at 15:57

## 2023-04-26 RX ADMIN — LORAZEPAM 1 MG: 1 TABLET ORAL at 13:41

## 2023-04-26 RX ADMIN — PANTOPRAZOLE SODIUM 40 MG: 40 INJECTION, POWDER, FOR SOLUTION INTRAVENOUS at 20:53

## 2023-04-26 NOTE — OCCUPATIONAL THERAPY NOTE
Occupational Therapy Progress Note     Patient Name: Sabina Hunter  QFPDI'M Date: 4/26/2023  Problem List  Principal Problem:    Hematochezia  Active Problems:    Syncope    Scalp laceration    Bipolar depression ([facility] [address] )    Primary hypertension    Gastric polyp    Severe protein-calorie malnutrition (HCC)    Diarrhea of presumed infectious origin     04/26/23 0915   Note Type   Note Type Treatment   Pain Assessment   Pain Assessment Tool 0-10   Pain Score No Pain   Restrictions/Precautions   Other Precautions Chair Alarm; Bed Alarm; Fall Risk;Cognitive   ADL   LB Dressing Assistance 4  Minimal Assistance   LB Dressing Deficit Setup; Requires assistive device for steadying;Verbal cueing;Supervision/safety; Increased time to complete;Pull up over hips   LB Dressing Comments Pt able to don socks and thread briefs while seated in chair but required assist with CM while standing with BUE support on RW to maintain balance   Functional Standing Tolerance   Activity Pt able to complete balance activity in which she maintained standing with 1UE support on RW and used other UE to reach outside of TOYA to reach therapists hand  Steadying assist required to maintain standing balance  Activity completed to increase standing balance during ADL tasks (i e  CM) and IADL tasks  Transfers   Sit to Stand   (SBA)   Additional items Increased time required;Verbal cues   Stand to Sit   (SBA)   Additional items Increased time required;Verbal cues   Stand pivot 4  Minimal assistance   Additional items Assist x 1; Increased time required;Verbal cues   Additional Comments Pt seated OOB in chair at beginning and end of session  STS from chair to RW @ SBA  Pt able to complete functional mobility around room 2x with Min A d/t decreased balance and safety awareness  Pt clearly walking straight into objects during mobility, requiring cues for direction and safety  During mobility, pt reported dizziness and was seated at EOB   Vitals monitored, see "chart below  Significant cueing required for hand placement and safety throughout mobility  Pt seated OOB in chair at end of session with chair alarm intact, call bell within reach and all needs met  Subjective   Subjective \"I'm just so tired today from being NPO I think,, I'm supposed to go for a scope\"   Cognition   Overall Cognitive Status [facility]   Arousal/Participation Alert; Responsive; Cooperative   Attention Attends with cues to redirect   Orientation Level Oriented X4   Memory Decreased recall of precautions   Following Commands Follows one step commands without difficulty   Comments Pt demonstrated improved cognition throughout session during conversation and during BIMS assessment scoring 15/15  Although cognition improved, pt still demonstrated safety awareness deficits throughout mobility  Difficult time safely utilizing RW and walked directly into objects on multiple occassions requiring cueing for direction  Activity Tolerance   Activity Tolerance Patient limited by fatigue   Medical Staff Made Aware Spoke with PT Shirley   Assessment   Assessment Pt completed OT tx session #3 focused on ADL performance and functional mobility  Pt alert and agreeable to participate  PT/OT co-treat completed d/t significant mobility deficits and safety concerns  Pt demonstrated improvements in cognition and mobility this session but continues to be limited by safety awareness  Pt currently completing UB ADLs @ S/set-up, LB ADLs @ Min A, and functional mobility with RW @ Min A  Pt demonstrating Good participation and Good potential to achieve goals but is currently demonstrating deficits in decrease activity tolerance, decrease standing balance, decrease performance during ADL tasks, decrease safety awareness , decrease UB MS, decrease generalized strength, decrease activity engagement and decrease performance during functional transfers   Continue to recommend post acute rehabilitation services upon discharge to increase " safety and independence in ADL tasks and functional mobility  Plan   Treatment Interventions ADL retraining;Functional transfer training   Goal Expiration Date 05/04/23   OT Treatment Day 3   OT Frequency 3-5x/wk   Recommendation   OT Discharge Recommendation Post acute rehabilitation services   [facility]   Lower Body Dressing 2   Bathing 2   Toileting 2   Upper Body Dressing 3   Grooming 3   Eating 4   Daily Activity Raw Score 16   Daily Activity Standardized Score (Calc for Raw Score >=11) 35 96   AM-[facility] Applied Cognition Inpatient   Following a Speech/Presentation 3   Understanding Ordinary Conversation 4   Taking Medications 3   Remembering Where Things Are Placed or Put Away 4   Remembering List of 4-5 Errands 2   Taking Care of Complicated Tasks 2   Applied Cognition Raw Score 18   Applied Cognition Standardized Score 38 07     The patient's raw score on the AM-PAC Daily Activity Inpatient Short Form is 16  A raw score of less than 19 suggests the patient may benefit from discharge to post-acute rehabilitation services  Please refer to the recommendation of the Occupational Therapist for safe discharge planning  Pt goals to be met by 5/4/2023     1  Pt will demonstrate ability to complete supine<>sit @ Mod I in order to increase safety and independence during ADL tasks  2  Pt will demonstrate ability to complete LB dressing @ Mod I in order to increase safety and independence during meaningful tasks  3  Pt will demonstrate ability to alessia/doff socks/shoes while sitting EOB @ Mod I in order to increase safety and independence during meaningful tasks  4  Pt will demonstrate ability to complete toileting tasks including CM and pericare @ Mod I in order to increase safety and independence during meaningful tasks  5  Pt will demonstrate ability to complete EOB, chair, toilet/commode transfers @ Mod I in order to increase performance and participation during functional tasks    6  Pt will demonstrate ability to stand for 7-8 minutes while maintaining G balance with use of least restrictive device for UB support PRN  7  Pt will demonstrate ability to tolerate 30-35 minute OT session with no vc'ing for deep breathing or use of energy conservation techniques in order to increase activity tolerance during functional tasks  8  Pt will demonstrate Good carryover of use of energy conservation/compensatory strategies during ADLs and functional tasks in order to increase safety and reduce risk for falls  9  Pt will demonstrate Good attention and participation in continued evaluation of functional ambulation house hold distances in order to assist with safe d/c planning  10  Pt will attend to continued cognitive assessments 100% of the time in order to provide most appropriate d/c recommendations  11  Pt will follow 100% simple 2-step commands and be A&O x4 consistently with environmental cues to increase participation in functional activities  12  Pt will identify 3 areas of interest/hobbies and 1 intervention on how to incorporate into daily life in order to increase interaction with environment and peers as well as increase participation in meaningful tasks  13  Pt will demonstrate 100% carryover of BUE HEP in order to increase BUE MS and increase performance during functional tasks upon d/c home      Melina Danielle OTR/L

## 2023-04-26 NOTE — PHYSICAL THERAPY NOTE
PHYSICAL THERAPY TREATMENT NOTE  NAME:  Stephen Osullivan  DATE: 04/26/23    Length Of Stay: 7  Performed at least 2 patient identifiers during session: Name and Birthday  Treatment time: [id number]  Treatment length: 26 min       04/26/23 0913   Note Type   Note Type Treatment   Pain Assessment   Pain Assessment Tool 0-10   Pain Score No Pain   Restrictions/Precautions   Other Precautions Chair Alarm; Bed Alarm; Fall Risk   General   Chart Reviewed Yes   Response to Previous Treatment Patient with no complaints from previous session  Cognition   Overall Cognitive Status WFL   Orientation Level Oriented X4   Following Commands Follows one step commands with increased time or repetition   Bed Mobility   Additional Comments Pt seated OOB in recliner at start and end of session; bed mobility not assessed this session   Transfers   Sit to Stand   (SBA)   Additional items Increased time required;Verbal cues   Stand to Sit   (SBA)   Additional items Increased time required;Verbal cues   Stand pivot 4  Minimal assistance   Additional items Assist x 1; Increased time required;Verbal cues   Additional Comments STS with SBA with VC for hand placement and safety   Ambulation/Elevation   Gait pattern Improper Weight shift; Wide TOYA; Decreased foot clearance; Foward flexed; Inconsistent sue; Excessively slow; Short stride   Gait Assistance   (min - mod A)   Additional items Verbal cues   Assistive Device Rolling walker   Distance 76' with min A primarily for weight shifting and balance however pt with moderate R lateral LOB requiring mod A to recover  Pt at times walking directly into objects into room with VC and tactile cues for improved path and safety  Pt completed obstacle negotiation, ambulating additional 40' with RW, max VC to complete figure 8 however pt attempting to step over objects, mod A to step over   Pt reporting dizziness at end of session, returned to seated, slightly pale, /93, sx resolving slightly over 5 min seated rest   Balance   Static Sitting Fair +   Dynamic Sitting Fair   Static Standing Fair -   Dynamic Standing Poor +   Ambulatory Poor   Endurance Deficit   Endurance Deficit Yes   Endurance Deficit Description fatigue, dizziness   Activity Tolerance   Activity Tolerance Patient limited by fatigue   Medical Staff Made Aware Nela MORRIS   Nurse Made Aware RN aware   Exercises   Knee AROM Long Arc Quad Sitting;20 reps;AROM; Bilateral   Ankle Pumps Sitting;20 reps;AROM; Bilateral   Assessment   Prognosis Fair   Problem List Decreased strength;Decreased endurance; Impaired balance;Decreased mobility; Impaired judgement;Decreased safety awareness   Barriers to Discharge Decreased caregiver support   Barriers to Discharge Comments Pt lives alone, limited activity tolerance/safety awareness   Goals   PT Treatment Day 2   Plan   Treatment/Interventions Functional transfer training;LE strengthening/ROM; Elevations; Therapeutic exercise;Patient/family training; Endurance training;Equipment eval/education; Bed mobility;Gait training; Compensatory technique education;Spoke to nursing;OT   Progress Slow progress, decreased activity tolerance   PT Frequency 3-5x/wk   Recommendation   PT Discharge Recommendation Post acute rehabilitation services   Equipment Recommended   (TBD by rehab)   AM-PAC Basic Mobility Inpatient   Turning in Flat Bed Without Bedrails 3   Lying on Back to Sitting on Edge of Flat Bed Without Bedrails 3   Moving Bed to Chair 3   Standing Up From Chair Using Arms 3   Walk in Room 2   Climb 3-5 Stairs With Railing 1   Basic Mobility Inpatient Raw Score 15   Basic Mobility Standardized Score 36 97   Highest Level Of Mobility   JH-HLM Goal 4: Move to chair/commode   JH-HLM Achieved 7: Walk 25 feet or more   Education   Education Provided Mobility training;Home exercise program;Assistive device   Patient Reinforcement needed   End of Consult   Patient Position at End of Consult Bedside chair;Bed/Chair alarm activated; All needs within reach     The patient's AM-PAC Basic Mobility Inpatient Short Form Raw Score is 15  A Raw score of less than or equal to 16 suggests the patient may benefit from discharge to post-acute rehabilitation services  Please also refer to the recommendation of the Physical Therapist for safe discharge planning  Assessment: Pt seen for PT treatment session this date with interventions consisting of gait training w/ emphasis on improving pt's ability to ambulate level surfaces x 75' with min - mod provided by therapist with RW and Therapeutic exercise consisting of: AROM 10 reps B LE in sitting position  Pt agreeable to PT treatment session upon arrival, pt found seated OOB in recliner, in no apparent distress  In comparison to previous session, pt with improvements in pt able to ambulate increased distance with decreased support however limited safety insight and dizziness limiting pt performance   Post session: pt returned back to recliner, chair alarm engaged, all needs in reach, and RN notified of session findings/recommendations Continue to recommend STR at time of d/c in order to maximize pt's functional independence and safety w/ mobility  Pt continues to be functioning below baseline level, and remains limited 2* factors listed above and including decreased strength, balance, mobility, and activity tolerance  PT will continue to see pt while here in order to address the deficits listed above and provide interventions consistent w/ POC in effort to achieve STGs       Arsalan Brandon, PT,DPT

## 2023-04-26 NOTE — ASSESSMENT & PLAN NOTE
Malnutrition Findings:   Adult Malnutrition type: Chronic illness  Adult Degree of Malnutrition: Other severe protein calorie malnutrition  Malnutrition Characteristics: Inadequate energy, Weight loss                  360 Statement: Chronic severe malnutrition related to depression, decreased appetite, recent diarrhea and nausea as evidenced by < 75% estimated energy intake > 1 mo, 13 5% weight loss x 7 mo ( 9/12/22 193lb, 2/9/22 177lb, 4/19/23 167lb)  Treated with: Advance diet as medically appropriate to lactose free, low fiber diet per GI recommendations  Recommend daily weights for nutrition monitoring  Will add ensure plus high PRO BID which is lactose free  BMI Findings: Body mass index is 28 67 kg/m²  Anna daughter/Family

## 2023-04-26 NOTE — PROGRESS NOTES
" [facility]  Progress Note - Alo Noel 72 y o  female MRN: [id number]    Unit/Bed#: -01 Encounter: [id number]    Assessment/Plan:  Gastric polyps  Constipation  Fecal impaction  Hematochezia  Melena     -She underwent EGD 4/21/2023 for evaluation of gastric mass seen on CT scan found to have multiple large gastric polyps that were greater than 1 cm 15 of which were removed likely representing fundic gland or hyperplastic polyps pathology still pending; biopsies for H  pylori are also pending   -Patient ultimately was able to tolerate a regular diet  -Re-consulted due to patient having an episode of brown emesis and some maroon stool  -VSS  -hgb today is 11 2, was 10 7 yesterday  -Bun was elevated yesterday, but has returned to normal today  -observed stool in bedside commode is black and formed    -suspect observed hematochezia yesterday was secondary to manual disimpaction    Plan:  -given improvement in hgb with black formed stool today, do not suspect active bleeding but may have residual oozing from multiple gastric polypectomies 04/21/2023  -will recheck hgb at noon  -if any decrease in hgb will consider EGD/colonoscopy tomorrow  -cont PPI BID    Mauricio Phoenix [facility]  Gastroenterology    Patient plan of care formulated with Dr Eboni Irizarry  Subjective:   Reports feeling well, and would like to go home  Denies any n/v/abd pain  Just moved her bowels for black formed stool in bedside commode  Would like to eat/drink when able  Objective:     Vitals: Blood pressure 130/78, pulse 100, temperature 98 1 °F (36 7 °C), resp  rate 18, height 5' 4\" (1 626 m), weight 75 8 kg (167 lb), SpO2 96 %  ,Body mass index is 28 67 kg/m²        Intake/Output Summary (Last 24 hours) at 4/26/2023 0915  Last data filed at 4/25/2023 1845  Gross per 24 hour   Intake 500 ml   Output 700 ml   Net -200 ml       Review of Systems: as per HPI  Review of Systems   Gastrointestinal: Positive for blood " in stool  Negative for abdominal pain, anal bleeding, constipation, diarrhea, nausea, rectal pain and vomiting  All other systems reviewed and are negative  Physical Exam:     Physical Exam  Vitals and nursing note reviewed  Constitutional:       General: She is not in acute distress  Appearance: She is well-developed  HENT:      Head: Normocephalic and atraumatic  Mouth/Throat:      Mouth: Mucous membranes are dry  Pharynx: Oropharynx is clear  Eyes:      Conjunctiva/sclera: Conjunctivae normal    Cardiovascular:      Rate and Rhythm: Normal rate  Heart sounds: No murmur heard  Pulmonary:      Effort: Pulmonary effort is normal  No respiratory distress  Abdominal:      Palpations: Abdomen is soft  Tenderness: There is no abdominal tenderness  Genitourinary:     Comments: Stool observed in bedside commode is black and formed  Skin:     General: Skin is warm and dry  Capillary Refill: Capillary refill takes less than 2 seconds  Coloration: Skin is not jaundiced  Neurological:      Mental Status: She is alert  Psychiatric:         Mood and Affect: Mood normal            Invasive Devices     Peripheral Intravenous Line  Duration           Peripheral IV 04/25/23 Right Antecubital <1 day          Drain  Duration           External Urinary Catheter 4 days                        CBC:   Lab Results   Component Value Date    HGB 11 2 (L) 04/26/2023    HCT 34 7 (L) 04/26/2023   ,   CMP:   Lab Results   Component Value Date    K 3 8 04/26/2023     (H) 04/26/2023    CO2 26 04/26/2023    BUN 17 04/26/2023    CREATININE 0 86 04/26/2023    CALCIUM 8 7 04/26/2023    EGFR 68 04/26/2023   ,   Lipase: No results found for: LIPASE,  PT/INR: No results found for: PT, INR,   Troponin: No results found for: TROPONINI,   Magnesium: No components found for: MAG,   Phosphorous: No results found for: PHOS    Counseling / Coordination of Care  Total time spent today  15 minutes  Greater than 50% of total time was spent with the patient and / or family counseling and / or coordination of care

## 2023-04-26 NOTE — ASSESSMENT & PLAN NOTE
· Gastric polyp, incidental finding on CT abdomen of pelvis during trauma work-up of 1 8 and 1 3 cm along the lateral wall  · Extensive history of esophagitis  · Continue PTA Protonix  · GI consulted recommended  · EGD completed polyps removed and sent for exam  Out patient follow up

## 2023-04-26 NOTE — PLAN OF CARE
Problem: OCCUPATIONAL THERAPY ADULT  Goal: Performs self-care activities at highest level of function for planned discharge setting  See evaluation for individualized goals  Description: Treatment Interventions: ADL retraining, Functional transfer training, UE strengthening/ROM, Endurance training, Patient/family training, Equipment evaluation/education, Neuromuscular reeducation, Compensatory technique education, Continued evaluation, Energy conservation, Activityengagement          See flowsheet documentation for full assessment, interventions and recommendations  Outcome: Progressing  Note: Limitation: Decreased ADL status, Decreased UE strength, Decreased Safe judgement during ADL, Decreased endurance, Decreased self-care trans, Decreased high-level ADLs  Prognosis: Good  Assessment: Pt completed OT tx session #3 focused on ADL performance and functional mobility  Pt alert and agreeable to participate  PT/OT co-treat completed d/t significant mobility deficits and safety concerns  Pt demonstrated improvements in cognition and mobility this session but continues to be limited by safety awareness  Pt currently completing UB ADLs @ S/set-up, LB ADLs @ Min A, and functional mobility with RW @ Min A  Pt demonstrating Good participation and Good potential to achieve goals but is currently demonstrating deficits in decrease activity tolerance, decrease standing balance, decrease performance during ADL tasks, decrease safety awareness , decrease UB MS, decrease generalized strength, decrease activity engagement and decrease performance during functional transfers  Continue to recommend post acute rehabilitation services upon discharge to increase safety and independence in ADL tasks and functional mobility       OT Discharge Recommendation: Post acute rehabilitation services

## 2023-04-26 NOTE — PROGRESS NOTES
[address]  Progress Note  Name: Kelsie Causey  MRN: [id number]  Unit/Bed#: -01 I Date of Admission: 4/19/2023   Date of Service: 4/26/2023 I Hospital Day: 7    Assessment/Plan   Syncope  Assessment & Plan  · Presents after syncopal episode, patient does not recall any events and fell down 12 wooden steps sustaining scalp laceration  · Son at bedside reports 3 weeks of diarrhea-patient states history of C  difficile but this is yellow without the foul odor, unlike her previous C  difficile episode  · Monitored on telemetry for 24 hours  No arrythmia noted by previous provider telemetry has since been discontinued  · Orthostatic BP appear negative  · Losartan and hydrochlorothiazide on hold  · Scalp laceration repaired by ED-neurochecks x24 hours, CT brain no acute normality  · Pt/ot recommend subacute rehab  · MRI brain obtained: no evidence of acute CVA, hemorrhage or mass   · Continue Lipitor   · Patient has psychosomatic symptoms due to her bipolar depression    * Hematochezia  Assessment & Plan  Could be mild oozing due to recent gastric polypectomy  Hemoglobin remained stable-we will check another H&H at noon, to determine if patient needs any procedure or can proceed conservatively  May use glycerin suppository prior to manual disimpaction  Continue PPI    Diarrhea of presumed infectious origin  Assessment & Plan  · Patient is complaining of intermittent episodes of diarrhea for the last 3 weeks  · No diarrhea noted to check stool cultures  · egd done which shows multiple gastric polyps which were biopsied  · Episode of vomiting today and got a little diaphoretic with brown vomitus noted    Check cbc, obstruction series placed on IV Protonix and IV Zofran and monitor for now discharge for today canceled to SNF    Severe protein-calorie malnutrition [facility])  Assessment & Plan  Malnutrition Findings:   Adult Malnutrition type: Chronic illness  Adult Degree of Malnutrition: Other severe protein calorie malnutrition  Malnutrition Characteristics: Inadequate energy, Weight loss                  360 Statement: Chronic severe malnutrition related to depression, decreased appetite, recent diarrhea and nausea as evidenced by < 75% estimated energy intake > 1 mo, 13 5% weight loss x 7 mo ( 9/12/22 193lb, 2/9/22 177lb, 4/19/23 167lb)  Treated with: Advance diet as medically appropriate to lactose free, low fiber diet per GI recommendations  Recommend daily weights for nutrition monitoring  Will add ensure plus high PRO BID which is lactose free  BMI Findings: Body mass index is 28 67 kg/m²  Gastric polyp  Assessment & Plan  · Gastric polyp, incidental finding on CT abdomen of pelvis during trauma work-up of 1 8 and 1 3 cm along the lateral wall  · Extensive history of esophagitis  · Continue PTA Protonix  · GI consulted recommended  · EGD completed polyps removed and sent for exam  Out patient follow up      Primary hypertension  Assessment & Plan  · PTA maintained on losartan 50 mg daily and hydrochlorothiazide 12 5 at home  · Syncopal episode with suspected orthostatic hypotension  Likely hypovolemia in setting of diarrhea at home  · BP normotensive last orthostatic bp negative   · Cont to hold  Hctz, resume losartan    Bipolar depression ([facility] [address] )  Assessment & Plan  · Follows closely with outpatient psychiatrist  · Continue PTA Lamictal 200 mg twice daily  ·  PTA lorazepam 1 mg in the a m  and afternoon, 4 mg at at bedtime  decrease bedtime dose to 2 mg  try To gradually wean down Ativan  As per son she has been having increasing loneliness anxiety and crying and at her outpatient psychiatrist has tried other medications which did not work and then he was increasing [address]  Concerned that that might be giving her some side effects of dizziness and weakness  Will ask psychiatry for evaluation while here  Patient denies any suicidal or homicidal ideation  needs outpatient psych follow up  · PDMP reviewed, no red flags    Scalp laceration  Assessment & Plan  · S/p syncopal event falling down wooden steps  · Sustained scalp laceration  · Repaired with staple and Dermabond  · Appreciate wound care consultation  · Pain control with Tylenol               VTE Pharmacologic Prophylaxis: VTE Score: 5 Hematochezia, remained on hold anticoagulation    Patient Centered Rounds: I performed bedside rounds with nursing staff today  Discussions with Specialists or Other Care Team Provider: Gastroenterology    Education and Discussions with Family / Patient: Updated  (son) via phone  Total Time Spent on Date of Encounter in care of patient: To monitor above conditions This time was spent on one or more of the following: performing physical exam; counseling and coordination of care; obtaining or reviewing history; documenting in the medical record; reviewing/ordering tests, medications or procedures; communicating with other healthcare professionals and discussing with patient's family/caregivers  Current Length of Stay: 7 day(s)  Current Patient Status: Inpatient   Certification Statement: The patient will continue to require additional inpatient hospital stay due to To monitor above conditions  Discharge Plan: Anticipate discharge in 24-48 hrs to To be due to mild    Code Status: Level 1 - Full Code    Subjective:   Seen and evaluated and examined  Resting comfortably  Reports when she was doing exercises with physical therapy, she felt lightheaded and had a color change  Denies any chest pain, nausea, vomiting  Objective:     Vitals:   Temp (24hrs), Av °F (36 7 °C), Min:97 7 °F (36 5 °C), Max:98 2 °F (36 8 °C)    Temp:  [97 7 °F (36 5 °C)-98 2 °F (36 8 °C)] 98 1 °F (36 7 °C)  HR:  [] 103  Resp:  [18] 18  BP: (106-143)/(56-93) 139/93  SpO2:  [91 %-96 %] 95 %  Body mass index is 28 67 kg/m²  Input and Output Summary (last 24 hours):      Intake/Output Summary (Last 24 hours) at 4/26/2023 1249  Last data filed at 4/26/2023 0919  Gross per 24 hour   Intake 500 ml   Output 800 ml   Net -300 ml       Physical Exam:   Physical Exam  Vitals reviewed  Constitutional:       Appearance: Normal appearance  She is not ill-appearing or diaphoretic  Eyes:      General: No scleral icterus  Left eye: No discharge  Extraocular Movements: Extraocular movements intact  Conjunctiva/sclera: Conjunctivae normal       Pupils: Pupils are equal, round, and reactive to light  Neck:      Vascular: No carotid bruit  Cardiovascular:      Rate and Rhythm: Normal rate  Heart sounds: Normal heart sounds  No murmur heard  No friction rub  No gallop  Pulmonary:      Effort: Pulmonary effort is normal  No respiratory distress  Breath sounds: No stridor  No wheezing or rhonchi  Abdominal:      General: Bowel sounds are normal  There is no distension  Palpations: There is no mass  Tenderness: There is no abdominal tenderness  Hernia: No hernia is present  Musculoskeletal:         General: No swelling, tenderness or signs of injury  Normal range of motion  Cervical back: Normal range of motion  No rigidity or tenderness  Lymphadenopathy:      Cervical: No cervical adenopathy  Skin:     General: Skin is warm  Neurological:      General: No focal deficit present  Mental Status: She is alert and oriented to person, place, and time  Cranial Nerves: No cranial nerve deficit  Sensory: No sensory deficit  Motor: No weakness        Coordination: Coordination normal          Additional Data:     Labs:  Results from last 7 days   Lab Units 04/26/23  0521 04/25/23  1449 04/25/23  0526 04/20/23  0535 04/19/23  1525   WBC Thousand/uL  --   --  8 87   < > 11 63*   HEMOGLOBIN g/dL 11 2*   < > 11 7   < > 14 4   HEMATOCRIT % 34 7*   < > 36 5   < > 44 1   PLATELETS Thousands/uL  --   --  340   < > 331   NEUTROS PCT %  --   --   --   -- 82*   LYMPHS PCT %  --   --   --   --  11*   MONOS PCT %  --   --   --   --  6   EOS PCT %  --   --   --   --  0    < > = values in this interval not displayed  Results from last 7 days   Lab Units 04/26/23  0521 04/20/23  0535 04/19/23  1525   SODIUM mmol/L 141   < > 142   POTASSIUM mmol/L 3 8   < > 3 3*   CHLORIDE mmol/L 109*   < > 104   CO2 mmol/L 26   < > 27   BUN mg/dL 17   < > 9   CREATININE mg/dL 0 86   < > 0 90   ANION GAP mmol/L 6   < > 11   CALCIUM mg/dL 8 7   < > 9 4   ALBUMIN g/dL  --   --  4 3   TOTAL BILIRUBIN mg/dL  --   --  0 81   ALK PHOS U/L  --   --  97   ALT U/L  --   --  36   AST U/L  --   --  22   GLUCOSE RANDOM mg/dL 90   < > 117    < > = values in this interval not displayed  Results from last 7 days   Lab Units 04/19/23  1525   INR  1 06     Results from last 7 days   Lab Units 04/24/23  1152 04/19/23  1539   POC GLUCOSE mg/dl 128 109               Lines/Drains:  Invasive Devices     Peripheral Intravenous Line  Duration           Peripheral IV 04/25/23 Right Antecubital <1 day          Drain  Duration           External Urinary Catheter 4 days                      Imaging: No pertinent imaging reviewed      Recent Cultures (last 7 days):         Last 24 Hours Medication List:   Current Facility-Administered Medications   Medication Dose Route Frequency Provider Last Rate   • acetaminophen  650 mg Oral Q6H PRN Asya S Molly, CRNP     • atorvastatin  40 mg Oral Daily Asya S Molly, CRNP     • glycerin (adult)  1 suppository Rectal Daily PRN Spring Anne MD     • lamoTRIgine  200 mg Oral BID Asya S Molly, CRNP     • LORazepam  1 mg Oral BID Asya S Molly, CRNP     • LORazepam  2 mg Oral HS Bjorn Hilario MD     • losartan  25 mg Oral Daily Kimberley Farrell MD     • meclizine  25 mg Oral [facility] Bjorn Hilario MD     • melatonin  6 mg Oral HS ASHVIN Cruz     • ondansetron  4 mg Intravenous Q6H PRN Bjorn Hilario MD     • oxyCODONE  5 mg Oral Q6H PRN Asya S Molly, SANDOVALNP • pantoprazole  40 mg Intravenous Q12H [address] Radford Gottron, MD     • polyethylene glycol  17 g Oral Daily Marine Mejia MD     • senna-docusate sodium  1 tablet Oral HS Radford Gottron, MD     • sodium chloride  125 mL/hr Intravenous Continuous Marine Mejia MD          Today, Patient Was Seen By: Marine Mejia MD    **Please Note: This note may have been constructed using a voice recognition system  **

## 2023-04-26 NOTE — ASSESSMENT & PLAN NOTE
Could be mild oozing due to recent gastric polypectomy  Hemoglobin remained stable-we will check another H&H at noon, to determine if patient needs any procedure or can proceed conservatively    May use glycerin suppository prior to manual disimpaction  Continue PPI

## 2023-04-26 NOTE — ASSESSMENT & PLAN NOTE
· Follows closely with outpatient psychiatrist  · Continue PTA Lamictal 200 mg twice daily  ·  PTA lorazepam 1 mg in the a m  and afternoon, 4 mg at at bedtime  decrease bedtime dose to 2 mg  try To gradually wean down Ativan  As per son she has been having increasing loneliness anxiety and crying and at her outpatient psychiatrist has tried other medications which did not work and then he was increasing [address]  Concerned that that might be giving her some side effects of dizziness and weakness  Will ask psychiatry for evaluation while here  Patient denies any suicidal or homicidal ideation  needs outpatient psych follow up  · PDMP reviewed, no red flags

## 2023-04-26 NOTE — PLAN OF CARE
Problem: PHYSICAL THERAPY ADULT  Goal: Performs mobility at highest level of function for planned discharge setting  See evaluation for individualized goals  Description: Treatment/Interventions: Functional transfer training, LE strengthening/ROM, Elevations, Therapeutic exercise, Endurance training, Patient/family training, Equipment eval/education, Bed mobility, Gait training, Compensatory technique education, Spoke to nursing, OT  Equipment Recommended:  (pt owns RW)       See flowsheet documentation for full assessment, interventions and recommendations  Outcome: Progressing  Note: Prognosis: Fair  Problem List: Decreased strength, Decreased endurance, Impaired balance, Decreased mobility, Impaired judgement, Decreased safety awareness  Assessment: Pt seen for PT treatment session this date with interventions consisting of gait training w/ emphasis on improving pt's ability to ambulate level surfaces x 75' with min - mod provided by therapist with RW and Therapeutic exercise consisting of: AROM 10 reps B LE in sitting position  Pt agreeable to PT treatment session upon arrival, pt found seated OOB in recliner, in no apparent distress  In comparison to previous session, pt with improvements in pt able to ambulate increased distance with decreased support however limited safety insight and dizziness limiting pt performance   Post session: pt returned back to recliner, chair alarm engaged, all needs in reach, and RN notified of session findings/recommendations Continue to recommend STR at time of d/c in order to maximize pt's functional independence and safety w/ mobility  Pt continues to be functioning below baseline level, and remains limited 2* factors listed above and including decreased strength, balance, mobility, and activity tolerance  PT will continue to see pt while here in order to address the deficits listed above and provide interventions consistent w/ POC in effort to achieve STGs    Barriers to Discharge: Decreased caregiver support  Barriers to Discharge Comments: Pt lives alone, limited activity tolerance/safety awareness  PT Discharge Recommendation: Post acute rehabilitation services    See flowsheet documentation for full assessment

## 2023-04-26 NOTE — CASE MANAGEMENT
Case Management Discharge Planning Note    Patient name Vamshi Sanchez  Location /-40 MRN [id number]  : 1950 Date 2023       Current Admission Date: 2023  Current Admission Diagnosis:Hematochezia   Patient Active Problem List    Diagnosis Date Noted   • Hematochezia 2023   • Breast cancer ([facility] [address] )    • Scalp laceration 2023   • Bipolar depression ([facility] ) 2023   • Primary hypertension 2023   • Gastric polyp 2023   • Severe protein-calorie malnutrition ([facility] ) 2023   • Diarrhea of presumed infectious origin 2023   • Syncope 2023      LOS (days): 7  Geometric Mean LOS (GMLOS) (days): 3 40  Days to GMLOS:-3 5     OBJECTIVE:  Risk of Unplanned Readmission Score: 12 99         Current admission status: Inpatient   Preferred Pharmacy:   [address] [place] [address] [address] [place] [id number]  Phone: [phone] Fax: [phone]    Primary Care Provider: Vasquez Chairez MD    Primary Insurance: MEDICARE  Secondary Insurance: [address] [place] [address] DETAILS:     CM explained DC IMM pt signed and copy placed in bin to be scanned and copy with patient                                                                                    IMM Given (Date):: 23

## 2023-04-26 NOTE — PLAN OF CARE
Problem: Potential for Falls  Goal: Patient will remain free of falls  Description: INTERVENTIONS:  - Educate patient/family on patient safety including physical limitations  - Instruct patient to call for assistance with activity   - Consult OT/PT to assist with strengthening/mobility   - Keep Call bell within reach  - Keep bed low and locked with side rails adjusted as appropriate  - Keep care items and personal belongings within reach  - Initiate and maintain comfort rounds  - Make Fall Risk Sign visible to staff  - Offer Toileting every 2 Hours, in advance of need  - Initiate/Maintain bed/chair alarm  - Apply yellow socks and bracelet for high fall risk patients  - Consider moving patient to room near nurses station  Outcome: Progressing     Problem: PAIN - ADULT  Goal: Verbalizes/displays adequate comfort level or baseline comfort level  Description: Interventions:  - Encourage patient to monitor pain and request assistance  - Assess pain using appropriate pain scale  - Administer analgesics based on type and severity of pain and evaluate response  - Implement non-pharmacological measures as appropriate and evaluate response  - Consider cultural and social influences on pain and pain management  - Notify physician/advanced practitioner if interventions unsuccessful or patient reports new pain  Outcome: Progressing     Problem: INFECTION - ADULT  Goal: Absence or prevention of progression during hospitalization  Description: INTERVENTIONS:  - Assess and monitor for signs and symptoms of infection  - Monitor lab/diagnostic results  - Monitor all insertion sites, i e  indwelling lines, tubes, and drains  - Monitor endotracheal if appropriate and nasal secretions for changes in amount and color  - [place] appropriate cooling/warming therapies per order  - Administer medications as ordered  - Instruct and encourage patient and family to use good hand hygiene technique  - Identify and instruct in appropriate isolation precautions for identified infection/condition  Outcome: Progressing     Problem: SAFETY ADULT  Goal: Patient will remain free of falls  Description: INTERVENTIONS:  - Educate patient/family on patient safety including physical limitations  - Instruct patient to call for assistance with activity   - Consult OT/PT to assist with strengthening/mobility   - Keep Call bell within reach  - Keep bed low and locked with side rails adjusted as appropriate  - Keep care items and personal belongings within reach  - Initiate and maintain comfort rounds  - Make Fall Risk Sign visible to staff  - Offer Toileting every 2 Hours, in advance of need  - Initiate/Maintain bed/chair alarm  - Apply yellow socks and bracelet for high fall risk patients  - Consider moving patient to room near nurses station  Outcome: Progressing  Goal: Maintain or return to baseline ADL function  Description: INTERVENTIONS:  - Educate patient/family on patient safety including physical limitations  - Instruct patient to call for assistance with activity   - Consult OT/PT to assist with strengthening/mobility   - Keep Call bell within reach  - Keep bed low and locked with side rails adjusted as appropriate  - Keep care items and personal belongings within reach  - Initiate and maintain comfort rounds  - Make Fall Risk Sign visible to staff  - Offer Toileting every 2 Hours, in advance of need  - Initiate/Maintain bed/chair alarm  - Apply yellow socks and bracelet for high fall risk patients  - Consider moving patient to room near nurses station  Outcome: Progressing  Goal: Maintains/Returns to pre admission functional level  Description: INTERVENTIONS:  - Educate patient/family on patient safety including physical limitations  - Instruct patient to call for assistance with activity   - Consult OT/PT to assist with strengthening/mobility   - Keep Call bell within reach  - Keep bed low and locked with side rails adjusted as appropriate  - Keep care items and personal belongings within reach  - Initiate and maintain comfort rounds  - Make Fall Risk Sign visible to staff  - Offer Toileting every 2 Hours, in advance of need  - Initiate/Maintain bed/chair alarm  - Apply yellow socks and bracelet for high fall risk patients  - Consider moving patient to room near nurses station  Outcome: Progressing     Problem: DISCHARGE PLANNING  Goal: Discharge to home or other facility with appropriate resources  Description: INTERVENTIONS:  - Identify barriers to discharge w/patient and caregiver  - Arrange for needed discharge resources and transportation as appropriate  - Identify discharge learning needs (meds, wound care, etc )  - Arrange for interpretive services to assist at discharge as needed  - Refer to Case Management Department for coordinating discharge planning if the patient needs post-hospital services based on physician/advanced practitioner order or complex needs related to functional status, cognitive ability, or social support system  Outcome: Progressing     Problem: Knowledge Deficit  Goal: Patient/family/caregiver demonstrates understanding of disease process, treatment plan, medications, and discharge instructions  Description: Complete learning assessment and assess knowledge base    Interventions:  - Provide teaching at level of understanding  - Provide teaching via preferred learning methods  Outcome: Progressing     Problem: MOBILITY - ADULT  Goal: Maintain or return to baseline ADL function  Description: INTERVENTIONS:  - Educate patient/family on patient safety including physical limitations  - Instruct patient to call for assistance with activity   - Consult OT/PT to assist with strengthening/mobility   - Keep Call bell within reach  - Keep bed low and locked with side rails adjusted as appropriate  - Keep care items and personal belongings within reach  - Initiate and maintain comfort rounds  - Make Fall Risk Sign visible to staff  - Offer Toileting every 2 Hours, in advance of need  - Initiate/Maintain bed/chair alarm  - Apply yellow socks and bracelet for high fall risk patients  - Consider moving patient to room near nurses station  Outcome: Progressing  Goal: Maintains/Returns to pre admission functional level  Description: INTERVENTIONS:  - Educate patient/family on patient safety including physical limitations  - Instruct patient to call for assistance with activity   - Consult OT/PT to assist with strengthening/mobility   - Keep Call bell within reach  - Keep bed low and locked with side rails adjusted as appropriate  - Keep care items and personal belongings within reach  - Initiate and maintain comfort rounds  - Make Fall Risk Sign visible to staff  - Offer Toileting every 2 Hours, in advance of need  - Initiate/Maintain bed/chair alarm  - Apply yellow socks and bracelet for high fall risk patients  - Consider moving patient to room near nurses station  Outcome: Progressing     Problem: Nutrition/Hydration-ADULT  Goal: Nutrient/Hydration intake appropriate for improving, restoring or maintaining nutritional needs  Description: Monitor and assess patient's nutrition/hydration status for malnutrition  Collaborate with interdisciplinary team and initiate plan and interventions as ordered  Monitor patient's weight and dietary intake as ordered or per policy  Utilize nutrition screening tool and intervene as necessary  Determine patient's food preferences and provide high-protein, high-caloric foods as appropriate       INTERVENTIONS:  - Monitor oral intake, urinary output, labs, and treatment plans  - Assess nutrition and hydration status and recommend course of action  - Evaluate amount of meals eaten  - Assist patient with eating if necessary   - Allow adequate time for meals  - Recommend/ encourage appropriate diets, oral nutritional supplements, and vitamin/mineral supplements  - Order, calculate, and assess calorie counts as needed  - Recommend, monitor, and adjust tube feedings and TPN/PPN based on assessed needs  - Assess need for intravenous fluids  - Provide specific nutrition/hydration education as appropriate  - Include patient/family/caregiver in decisions related to nutrition  Outcome: Progressing

## 2023-04-26 NOTE — ASSESSMENT & PLAN NOTE
· PTA maintained on losartan 50 mg daily and hydrochlorothiazide 12 5 at home  · Syncopal episode with suspected orthostatic hypotension  Likely hypovolemia in setting of diarrhea at home    · BP normotensive last orthostatic bp negative   · Cont to hold  Hctz, resume losartan

## 2023-04-26 NOTE — CASE MANAGEMENT
Case Management Discharge Planning Note    Patient name Hope Jacobs  Location /-96 MRN [id number]  : 1950 Date 2023       Current Admission Date: 2023  Current Admission Diagnosis:Hematochezia   Patient Active Problem List    Diagnosis Date Noted   • Hematochezia 2023   • Breast cancer ([facility] )    • Scalp laceration 2023   • Bipolar depression ([facility] ) 2023   • Primary hypertension 2023   • Gastric polyp 2023   • Severe protein-calorie malnutrition ([facility] ) 2023   • Diarrhea of presumed infectious origin 2023   • Syncope 2023      LOS (days): 7  Geometric Mean LOS (GMLOS) (days): 3 40  Days to GMLOS:-3 2     OBJECTIVE:  Risk of Unplanned Readmission Score: 12 78         Current admission status: Inpatient   Preferred Pharmacy:   [address] [place] [address] [address] [place] [id number]  Phone: [phone] Fax: [phone]    Primary Care Provider: Pavel Leslie MD    Primary Insurance: MEDICARE  Secondary Insurance: [facility]    DISCHARGE DETAILS:     CM returned son Tramaine Porter call and he is in agreement with [facility] post discharge STR

## 2023-04-27 VITALS
DIASTOLIC BLOOD PRESSURE: 83 MMHG | SYSTOLIC BLOOD PRESSURE: 150 MMHG | OXYGEN SATURATION: 93 % | TEMPERATURE: 97.9 F | RESPIRATION RATE: 18 BRPM | BODY MASS INDEX: 28.51 KG/M2 | HEIGHT: 64 IN | WEIGHT: 167 LBS | HEART RATE: 82 BPM

## 2023-04-27 LAB
ALBUMIN SERPL BCP-MCNC: 3.9 G/DL (ref 3.5–5)
ALP SERPL-CCNC: 86 U/L (ref 34–104)
ALT SERPL W P-5'-P-CCNC: 53 U/L (ref 7–52)
ANION GAP SERPL CALCULATED.3IONS-SCNC: 7 MMOL/L (ref 4–13)
AST SERPL W P-5'-P-CCNC: 30 U/L (ref 13–39)
BASOPHILS # BLD AUTO: 0.05 THOUSANDS/ΜL (ref 0–0.1)
BASOPHILS NFR BLD AUTO: 1 % (ref 0–1)
BILIRUB SERPL-MCNC: 0.77 MG/DL (ref 0.2–1)
BUN SERPL-MCNC: 14 MG/DL (ref 5–25)
CALCIUM SERPL-MCNC: 9.1 MG/DL (ref 8.4–10.2)
CHLORIDE SERPL-SCNC: 110 MMOL/L (ref 96–108)
CO2 SERPL-SCNC: 26 MMOL/L (ref 21–32)
CREAT SERPL-MCNC: 0.75 MG/DL (ref 0.6–1.3)
EOSINOPHIL # BLD AUTO: 0.17 THOUSAND/ΜL (ref 0–0.61)
EOSINOPHIL NFR BLD AUTO: 3 % (ref 0–6)
ERYTHROCYTE [DISTWIDTH] IN BLOOD BY AUTOMATED COUNT: 15.6 % (ref 11.6–15.1)
FLUAV RNA RESP QL NAA+PROBE: NEGATIVE
FLUBV RNA RESP QL NAA+PROBE: NEGATIVE
GFR SERPL CREATININE-BSD FRML MDRD: 80 ML/MIN/1.73SQ M
GLUCOSE SERPL-MCNC: 90 MG/DL (ref 65–140)
HCT VFR BLD AUTO: 34.3 % (ref 34.8–46.1)
HGB BLD-MCNC: 10.9 G/DL (ref 11.5–15.4)
IMM GRANULOCYTES # BLD AUTO: 0.02 THOUSAND/UL (ref 0–0.2)
IMM GRANULOCYTES NFR BLD AUTO: 0 % (ref 0–2)
LYMPHOCYTES # BLD AUTO: 1.87 THOUSANDS/ΜL (ref 0.6–4.47)
LYMPHOCYTES NFR BLD AUTO: 31 % (ref 14–44)
MCH RBC QN AUTO: 29.4 PG (ref 26.8–34.3)
MCHC RBC AUTO-ENTMCNC: 31.8 G/DL (ref 31.4–37.4)
MCV RBC AUTO: 93 FL (ref 82–98)
MONOCYTES # BLD AUTO: 0.54 THOUSAND/ΜL (ref 0.17–1.22)
MONOCYTES NFR BLD AUTO: 9 % (ref 4–12)
NEUTROPHILS # BLD AUTO: 3.44 THOUSANDS/ΜL (ref 1.85–7.62)
NEUTS SEG NFR BLD AUTO: 56 % (ref 43–75)
NRBC BLD AUTO-RTO: 0 /100 WBCS
PLATELET # BLD AUTO: 337 THOUSANDS/UL (ref 149–390)
PMV BLD AUTO: 9 FL (ref 8.9–12.7)
POTASSIUM SERPL-SCNC: 4.1 MMOL/L (ref 3.5–5.3)
PROT SERPL-MCNC: 6.2 G/DL (ref 6.4–8.4)
RBC # BLD AUTO: 3.71 MILLION/UL (ref 3.81–5.12)
RSV RNA RESP QL NAA+PROBE: NEGATIVE
SARS-COV-2 RNA RESP QL NAA+PROBE: NEGATIVE
SODIUM SERPL-SCNC: 143 MMOL/L (ref 135–147)
WBC # BLD AUTO: 6.09 THOUSAND/UL (ref 4.31–10.16)

## 2023-04-27 RX ORDER — PANTOPRAZOLE SODIUM 40 MG/1
TABLET, DELAYED RELEASE ORAL
Qty: 45 TABLET | Refills: 0
Start: 2023-04-27

## 2023-04-27 RX ORDER — AMOXICILLIN 250 MG
1 CAPSULE ORAL DAILY PRN
Qty: 30 TABLET | Refills: 0
Start: 2023-04-27 | End: 2023-05-27

## 2023-04-27 RX ADMIN — SODIUM CHLORIDE 125 ML/HR: 0.9 INJECTION, SOLUTION INTRAVENOUS at 05:31

## 2023-04-27 RX ADMIN — LORAZEPAM 1 MG: 1 TABLET ORAL at 08:44

## 2023-04-27 RX ADMIN — PANTOPRAZOLE SODIUM 40 MG: 40 INJECTION, POWDER, FOR SOLUTION INTRAVENOUS at 08:44

## 2023-04-27 RX ADMIN — ATORVASTATIN CALCIUM 40 MG: 40 TABLET, FILM COATED ORAL at 08:44

## 2023-04-27 RX ADMIN — MECLIZINE HYDROCHLORIDE 25 MG: 25 TABLET ORAL at 05:30

## 2023-04-27 RX ADMIN — LAMOTRIGINE 200 MG: 100 TABLET ORAL at 08:44

## 2023-04-27 RX ADMIN — LOSARTAN POTASSIUM 25 MG: 25 TABLET, FILM COATED ORAL at 08:44

## 2023-04-27 NOTE — ASSESSMENT & PLAN NOTE
· S/p syncopal event falling down wooden steps  · Sustained scalp laceration  · Appreciate wound care consultation  · Pain control with Tylenol  · Staple removed

## 2023-04-27 NOTE — ASSESSMENT & PLAN NOTE
· Patient is complaining of intermittent episodes of diarrhea for the last 3 weeks  · No diarrhea noted to check stool cultures  · egd done which shows multiple gastric polyps which were biopsied  · Patient is on laxative, will adjust bowel regimen

## 2023-04-27 NOTE — ASSESSMENT & PLAN NOTE
· PTA maintained on losartan 50 mg daily and hydrochlorothiazide 12 5 at home  · Syncopal episode with suspected orthostatic hypotension  Likely hypovolemia in setting of diarrhea at home    · BP normotensive last orthostatic bp negative   · Can resume home medication

## 2023-04-27 NOTE — ASSESSMENT & PLAN NOTE
· Follows closely with outpatient psychiatrist  · Continue PTA Lamictal 200 mg twice daily  ·  PTA lorazepam 1 mg in the a m  and afternoon, 4 mg at at bedtime  decrease bedtime dose to 2 mg  try To gradually wean down Ativan  As per son she has been having increasing loneliness anxiety and crying and at her outpatient psychiatrist has tried other medications which did not work and then he was increasing [address]  Concerned that that might be giving her some side effects of dizziness and weakness  Will ask psychiatry for evaluation while here  Patient denies any suicidal or homicidal ideation  needs outpatient psych follow up  · PDMP reviewed, no red flags

## 2023-04-27 NOTE — PLAN OF CARE
Problem: Potential for Falls  Goal: Patient will remain free of falls  Description: INTERVENTIONS:  - Educate patient/family on patient safety including physical limitations  - Instruct patient to call for assistance with activity   - Consult OT/PT to assist with strengthening/mobility   - Keep Call bell within reach  - Keep bed low and locked with side rails adjusted as appropriate  - Keep care items and personal belongings within reach  - Initiate and maintain comfort rounds  - Make Fall Risk Sign visible to staff  - Offer Toileting every 2 Hours, in advance of need  - Initiate/Maintain bed/chair alarm  - Apply yellow socks and bracelet for high fall risk patients  - Consider moving patient to room near nurses station  Outcome: Progressing     Problem: PAIN - ADULT  Goal: Verbalizes/displays adequate comfort level or baseline comfort level  Description: Interventions:  - Encourage patient to monitor pain and request assistance  - Assess pain using appropriate pain scale  - Administer analgesics based on type and severity of pain and evaluate response  - Implement non-pharmacological measures as appropriate and evaluate response  - Consider cultural and social influences on pain and pain management  - Notify physician/advanced practitioner if interventions unsuccessful or patient reports new pain  Outcome: Progressing     Problem: INFECTION - ADULT  Goal: Absence or prevention of progression during hospitalization  Description: INTERVENTIONS:  - Assess and monitor for signs and symptoms of infection  - Monitor lab/diagnostic results  - Monitor all insertion sites, i e  indwelling lines, tubes, and drains  - Monitor endotracheal if appropriate and nasal secretions for changes in amount and color  - [place] appropriate cooling/warming therapies per order  - Administer medications as ordered  - Instruct and encourage patient and family to use good hand hygiene technique  - Identify and instruct in appropriate isolation precautions for identified infection/condition  Outcome: Progressing     Problem: SAFETY ADULT  Goal: Patient will remain free of falls  Description: INTERVENTIONS:  - Educate patient/family on patient safety including physical limitations  - Instruct patient to call for assistance with activity   - Consult OT/PT to assist with strengthening/mobility   - Keep Call bell within reach  - Keep bed low and locked with side rails adjusted as appropriate  - Keep care items and personal belongings within reach  - Initiate and maintain comfort rounds  - Make Fall Risk Sign visible to staff  - Offer Toileting every 2 Hours, in advance of need  - Initiate/Maintain bed/chair alarm  - Apply yellow socks and bracelet for high fall risk patients  - Consider moving patient to room near nurses station  Outcome: Progressing  Goal: Maintain or return to baseline ADL function  Description: INTERVENTIONS:  - Educate patient/family on patient safety including physical limitations  - Instruct patient to call for assistance with activity   - Consult OT/PT to assist with strengthening/mobility   - Keep Call bell within reach  - Keep bed low and locked with side rails adjusted as appropriate  - Keep care items and personal belongings within reach  - Initiate and maintain comfort rounds  - Make Fall Risk Sign visible to staff  - Offer Toileting every 2 Hours, in advance of need  - Initiate/Maintain bed/chair alarm  - Apply yellow socks and bracelet for high fall risk patients  - Consider moving patient to room near nurses station  Outcome: Progressing  Goal: Maintains/Returns to pre admission functional level  Description: INTERVENTIONS:  - Educate patient/family on patient safety including physical limitations  - Instruct patient to call for assistance with activity   - Consult OT/PT to assist with strengthening/mobility   - Keep Call bell within reach  - Keep bed low and locked with side rails adjusted as appropriate  - Keep care items and personal belongings within reach  - Initiate and maintain comfort rounds  - Make Fall Risk Sign visible to staff  - Offer Toileting every 2 Hours, in advance of need  - Initiate/Maintain bed/chair alarm  - Apply yellow socks and bracelet for high fall risk patients  - Consider moving patient to room near nurses station  Outcome: Progressing     Problem: DISCHARGE PLANNING  Goal: Discharge to home or other facility with appropriate resources  Description: INTERVENTIONS:  - Identify barriers to discharge w/patient and caregiver  - Arrange for needed discharge resources and transportation as appropriate  - Identify discharge learning needs (meds, wound care, etc )  - Arrange for interpretive services to assist at discharge as needed  - Refer to Case Management Department for coordinating discharge planning if the patient needs post-hospital services based on physician/advanced practitioner order or complex needs related to functional status, cognitive ability, or social support system  Outcome: Progressing     Problem: Knowledge Deficit  Goal: Patient/family/caregiver demonstrates understanding of disease process, treatment plan, medications, and discharge instructions  Description: Complete learning assessment and assess knowledge base    Interventions:  - Provide teaching at level of understanding  - Provide teaching via preferred learning methods  Outcome: Progressing     Problem: MOBILITY - ADULT  Goal: Maintain or return to baseline ADL function  Description: INTERVENTIONS:  - Educate patient/family on patient safety including physical limitations  - Instruct patient to call for assistance with activity   - Consult OT/PT to assist with strengthening/mobility   - Keep Call bell within reach  - Keep bed low and locked with side rails adjusted as appropriate  - Keep care items and personal belongings within reach  - Initiate and maintain comfort rounds  - Make Fall Risk Sign visible to staff  - Offer Toileting every 2 Hours, in advance of need  - Initiate/Maintain bed/chair alarm  - Apply yellow socks and bracelet for high fall risk patients  - Consider moving patient to room near nurses station  Outcome: Progressing  Goal: Maintains/Returns to pre admission functional level  Description: INTERVENTIONS:  - Educate patient/family on patient safety including physical limitations  - Instruct patient to call for assistance with activity   - Consult OT/PT to assist with strengthening/mobility   - Keep Call bell within reach  - Keep bed low and locked with side rails adjusted as appropriate  - Keep care items and personal belongings within reach  - Initiate and maintain comfort rounds  - Make Fall Risk Sign visible to staff  - Offer Toileting every 2 Hours, in advance of need  - Initiate/Maintain bed/chair alarm  - Apply yellow socks and bracelet for high fall risk patients  - Consider moving patient to room near nurses station  Outcome: Progressing     Problem: Nutrition/Hydration-ADULT  Goal: Nutrient/Hydration intake appropriate for improving, restoring or maintaining nutritional needs  Description: Monitor and assess patient's nutrition/hydration status for malnutrition  Collaborate with interdisciplinary team and initiate plan and interventions as ordered  Monitor patient's weight and dietary intake as ordered or per policy  Utilize nutrition screening tool and intervene as necessary  Determine patient's food preferences and provide high-protein, high-caloric foods as appropriate       INTERVENTIONS:  - Monitor oral intake, urinary output, labs, and treatment plans  - Assess nutrition and hydration status and recommend course of action  - Evaluate amount of meals eaten  - Assist patient with eating if necessary   - Allow adequate time for meals  - Recommend/ encourage appropriate diets, oral nutritional supplements, and vitamin/mineral supplements  - Order, calculate, and assess calorie counts as needed  - Recommend, monitor, and adjust tube feedings and TPN/PPN based on assessed needs  - Assess need for intravenous fluids  - Provide specific nutrition/hydration education as appropriate  - Include patient/family/caregiver in decisions related to nutrition  Outcome: Progressing

## 2023-04-27 NOTE — ASSESSMENT & PLAN NOTE
Could be mild oozing due to recent gastric polypectomy  Hemoglobin remained stable-we will check another H&H at noon, to determine if patient needs any procedure or can proceed conservatively  May use glycerin suppository prior to manual disimpaction  Continue PPI 2 times daily for 2 weeks, then 1 times daily    Pending biopsy report  Check CBC in 1 week  Hemoglobin remained stable

## 2023-04-27 NOTE — CASE MANAGEMENT
Case Management Discharge Planning Note    Patient name Mary Shannon  Location /-09 MRN [id number]  : 1950 Date 2023       Current Admission Date: 2023  Current Admission Diagnosis:Hematochezia   Patient Active Problem List    Diagnosis Date Noted   • Hematochezia 2023   • Breast cancer ([facility] )    • Scalp laceration 2023   • Bipolar depression ([facility] ) 2023   • Primary hypertension 2023   • Gastric polyp 2023   • Severe protein-calorie malnutrition ([facility] ) 2023   • Diarrhea of presumed infectious origin 2023   • Syncope 2023      LOS (days): 8  Geometric Mean LOS (GMLOS) (days): 4 30  Days to GMLOS:-3 3     OBJECTIVE:  Risk of Unplanned Readmission Score: 13 26         Current admission status: Inpatient   Preferred Pharmacy:   [address] [place] [address] [address] [place] [id number]  Phone: [phone] Fax: [phone]    Primary Care Provider: Hector Rubinstein, MD    Primary Insurance: MEDICARE  Secondary Insurance: [address] [place] [address] DETAILS:              pt is medically stable for discharge  CM placing [facility] request [facility]         as per [facility] will  pt at 1300 to transport to [facility]

## 2023-04-27 NOTE — PROGRESS NOTES
[facility]  Progress Note - Krystian Mcgarry 72 y o  female MRN: [id number]    Unit/Bed#: -01 Encounter: [id number]    Assessment/Plan:  Gastric polyps  Constipation  Fecal impaction  Hematochezia  -She underwent EGD 4/21/2023 for evaluation of gastric mass seen on CT scan found to have multiple large gastric polyps that were greater than 1 cm 15 of which were removed likely representing fundic gland or hyperplastic polyps pathology still pending; biopsies for H  pylori are also pending at that time  -Patient ultimately was able to tolerate a regular diet  -Re-consulted due to patient having an episode of brown emesis and some maroon stool  -Hemoglobin stable today at 11 7-->,11 3-->10 7-->11 2-->11 4-->10 9 vital signs stable  -Elevated BUN-now normalized  -Low suspicion for active high-volume GI bleeding however she may be having some oozing from her gastric polypectomy sites  -Her hematochezia is likely due to recent disimpaction and have low suspicion for brisk upper GI bleed  -Last bowel movement reported as brown       Plan:  -Stability in her hemoglobin over the past 24 hours suspect she has residual bleeding/melena from polypectomy performed on 4/21   -May advance diet to previously tolerated diet   -Continue with PPI every 12 hours for 2 weeks then reduce to once daily  -Continue bowel regimen-consider titrating down bowel regimen as she reports having loose bowel movements now   -Not planning on any endoscopic evaluation at this time  -Will follow-up on pathology results  -Recommended checking hemoglobin 1 week from discharge and follow-up with her primary gastroenterologist       Subjective:   Patient seen and examined  No further red or black stool  Had a brown loose bowel movement today  Asking about discharge  States that she has some mild abdominal discomfort      Objective:     Vitals: Blood pressure 150/83, pulse 82, temperature 97 9 °F (36 6 °C), temperature source "Temporal, resp  rate 18, height 5' 4\" (1 626 m), weight 75 8 kg (167 lb), SpO2 93 %  ,Body mass index is 28 67 kg/m²  Intake/Output Summary (Last 24 hours) at 4/27/2023 0850  Last data filed at 4/26/2023 1851  Gross per 24 hour   Intake 120 ml   Output 400 ml   Net -280 ml       Review of Systems: as per HPI  Review of Systems    Physical Exam:     Physical Exam  Constitutional:       General: She is not in acute distress  HENT:      Head: Normocephalic  Eyes:      General: No scleral icterus  Cardiovascular:      Rate and Rhythm: Normal rate  Pulmonary:      Effort: Pulmonary effort is normal    Abdominal:      General: There is no distension  Palpations: Abdomen is soft  Tenderness: There is no abdominal tenderness  There is no guarding  Musculoskeletal:         General: No swelling  Cervical back: Neck supple  Skin:     Coloration: Skin is not jaundiced  Neurological:      Mental Status: She is alert and oriented to person, place, and time     Psychiatric:         Mood and Affect: Mood normal            Invasive Devices     Peripheral Intravenous Line  Duration           Peripheral IV 04/25/23 Right Antecubital 1 day          Drain  Duration           External Urinary Catheter 5 days                        CBC:   Lab Results   Component Value Date    WBC 6 09 04/27/2023    HGB 10 9 (L) 04/27/2023    HCT 34 3 (L) 04/27/2023    MCV 93 04/27/2023     04/27/2023    MCH 29 4 04/27/2023    MCHC 31 8 04/27/2023    RDW 15 6 (H) 04/27/2023    MPV 9 0 04/27/2023    NRBC 0 04/27/2023   ,   CMP:   Lab Results   Component Value Date    K 4 1 04/27/2023     (H) 04/27/2023    CO2 26 04/27/2023    BUN 14 04/27/2023    CREATININE 0 75 04/27/2023    CALCIUM 9 1 04/27/2023    AST 30 04/27/2023    ALT 53 (H) 04/27/2023    ALKPHOS 86 04/27/2023    EGFR 80 04/27/2023     "

## 2023-04-27 NOTE — NURSING NOTE
One staple removed from right scalp, patient tolerated procedure well   Dried bloody drainage present at staple site, no new drainage seen after staple removal

## 2023-04-27 NOTE — ASSESSMENT & PLAN NOTE
· Presents after syncopal episode, patient does not recall any events and fell down 12 wooden steps sustaining scalp laceration  · Son at bedside reports 3 weeks of diarrhea-patient states history of C  difficile but this is yellow without the foul odor, unlike her previous C  difficile episode  · Monitored on telemetry for 24 hours  No arrythmia noted by previous provider telemetry has since been discontinued  · Orthostatic BP appear negative  · Losartan and hydrochlorothiazide on hold  · Scalp laceration repaired by ED-neurochecks x24 hours, CT brain no acute normality  · Pt/ot recommend subacute rehab  · MRI brain obtained: no evidence of acute CVA, hemorrhage or mass   · Continue Lipitor   · Patient has psychosomatic symptoms due to her bipolar depression  · Advised the patient to increase p o  hydration, may use compression stocking  Change posture slowly

## 2023-04-27 NOTE — NURSING NOTE
KAR'ed to [facility] for acute rehab via BLS in stable condition  Script for lorazepam and bloodwork sent w AVS with transport team  Son at bedside

## 2023-04-27 NOTE — DISCHARGE SUMMARY
[address]  Discharge- Betty Kim 1950, 72 y o  female MRN: [id number]  Unit/Bed#: -01 Encounter: [id number]  Primary Care Provider: Nathan Long MD   Date and time admitted to hospital: 4/19/2023  2:50 PM    Syncope  Assessment & Plan  · Presents after syncopal episode, patient does not recall any events and fell down 12 wooden steps sustaining scalp laceration  · Son at bedside reports 3 weeks of diarrhea-patient states history of C  difficile but this is yellow without the foul odor, unlike her previous C  difficile episode  · Monitored on telemetry for 24 hours  No arrythmia noted by previous provider telemetry has since been discontinued  · Orthostatic BP appear negative  · Losartan and hydrochlorothiazide on hold  · Scalp laceration repaired by ED-neurochecks x24 hours, CT brain no acute normality  · Pt/ot recommend subacute rehab  · MRI brain obtained: no evidence of acute CVA, hemorrhage or mass   · Continue Lipitor   · Patient has psychosomatic symptoms due to her bipolar depression  · Advised the patient to increase p o  hydration, may use compression stocking  Change posture slowly  * Hematochezia  Assessment & Plan  Could be mild oozing due to recent gastric polypectomy  Hemoglobin remained stable-we will check another H&H at noon, to determine if patient needs any procedure or can proceed conservatively  May use glycerin suppository prior to manual disimpaction  Continue PPI 2 times daily for 2 weeks, then 1 times daily  Pending biopsy report  Check CBC in 1 week  Hemoglobin remained stable      Diarrhea of presumed infectious origin  Assessment & Plan  · Patient is complaining of intermittent episodes of diarrhea for the last 3 weeks  · No diarrhea noted to check stool cultures  · egd done which shows multiple gastric polyps which were biopsied  · Patient is on laxative, will adjust bowel regimen      Severe protein-calorie malnutrition [facility])  Assessment & Plan  Malnutrition Findings:   Adult Malnutrition type: Chronic illness  Adult Degree of Malnutrition: Other severe protein calorie malnutrition  Malnutrition Characteristics: Inadequate energy, Weight loss                  360 Statement: Chronic severe malnutrition related to depression, decreased appetite, recent diarrhea and nausea as evidenced by < 75% estimated energy intake > 1 mo, 13 5% weight loss x 7 mo ( 9/12/22 193lb, 2/9/22 177lb, 4/19/23 167lb)  Treated with: Advance diet as medically appropriate to lactose free, low fiber diet per GI recommendations  Recommend daily weights for nutrition monitoring  Will add ensure plus high PRO BID which is lactose free  BMI Findings: Body mass index is 28 67 kg/m²  Gastric polyp  Assessment & Plan  · Gastric polyp, incidental finding on CT abdomen of pelvis during trauma work-up of 1 8 and 1 3 cm along the lateral wall  · Extensive history of esophagitis  · Continue PTA Protonix  · GI consulted recommended  · EGD completed polyps removed and sent for exam  Out patient follow up      Primary hypertension  Assessment & Plan  · PTA maintained on losartan 50 mg daily and hydrochlorothiazide 12 5 at home  · Syncopal episode with suspected orthostatic hypotension  Likely hypovolemia in setting of diarrhea at home  · BP normotensive last orthostatic bp negative   · Can resume home medication    Bipolar depression ([facility] [address] )  Assessment & Plan  · Follows closely with outpatient psychiatrist  · Continue PTA Lamictal 200 mg twice daily  ·  PTA lorazepam 1 mg in the a m  and afternoon, 4 mg at at bedtime  decrease bedtime dose to 2 mg  try To gradually wean down Ativan  As per son she has been having increasing loneliness anxiety and crying and at her outpatient psychiatrist has tried other medications which did not work and then [facility] [address]  Concerned that that might be giving her some side effects of dizziness and weakness  Will ask psychiatry for evaluation while here  Patient denies any suicidal or homicidal ideation  needs outpatient psych follow up  · PDMP reviewed, no red flags    Scalp laceration  Assessment & Plan  · S/p syncopal event falling down wooden steps  · Sustained scalp laceration  · Appreciate wound care consultation  · Pain control with Tylenol  · Staple removed        Medical Problems     Resolved Problems  Date Reviewed: 4/24/2023          Resolved    Disease of thyroid gland 4/25/2023     Resolved by  Marine Mejia MD    Anxiety 4/25/2023     Resolved by  Marine Mejia MD    Esophagitis 4/25/2023     Resolved by  Marine Mejia MD        Discharging Physician / Practitioner: Marine Mejia MD  PCP: Steve Roman MD  Admission Date:   Admission Orders (From admission, onward)     Ordered        04/19/23 [address]  Once                      Discharge Date: 04/27/23    Consultations During Hospital Stay:  · Gastroenterology  · PT/OT    Procedures Performed:   XR abdomen obstruction series   Final Result by Anthony Jaime MD (04/24 1218)      Nonobstructive bowel gas pattern  Workstation performed: [id number]         MRI brain wo contrast   Final Result by TRACY Ogden MD (04/20 1723)      No acute intracranial pathology  Chronic microangiopathy  Workstation performed: [id number]         TRAUMA - CT head wo contrast   Final Result by Rachel Regan MD (04/19 1547)      No acute intracranial abnormality  Workstation performed: [id number]         TRAUMA - CT spine cervical wo contrast   Final Result by Rachel Regan MD (04/19 1547)      No cervical spine fracture or traumatic malalignment  Workstation performed: [id number]         TRAUMA - CT chest abdomen pelvis w contrast   Final Result by Rachel Regan MD (04/19 1547)      No CT findings of trauma        Polypoid enhancing mass in the gastric body along the lateral wall in series 2 image 111 measuring 1 8 x 1 3 cm  Correlate with GI symptoms and GI consult  Workstation performed: [id number]         XR Trauma chest portable   Final Result by Gaby Mcgill MD (04/19 1526)      No acute cardiopulmonary disease  Workstation performed: [id number]         XR Trauma pelvis ap only 1 or 2 vw   Final Result by Gaby Mcgill MD (04/19 1527)      No acute osseous abnormality  Workstation performed: [id number]         ·   EGD:Normal-appearing esophagus  Multiple gastric polyps largest about 12 to 14 mm in size; 15 removed with cold snare polypectomy  Biopsies taken to rule out H  pylori  · Normal-appearing duodenal bulb and visualized second portion of duodenum    Significant Findings / Test Results:   Lab Results   Component Value Date    WBC 6 09 04/27/2023    HGB 10 9 (L) 04/27/2023    HCT 34 3 (L) 04/27/2023    MCV 93 04/27/2023     04/27/2023   ·   Lab Results   Component Value Date    SODIUM 143 04/27/2023    K 4 1 04/27/2023     (H) 04/27/2023    CO2 26 04/27/2023    AGAP 7 04/27/2023    BUN 14 04/27/2023    CREATININE 0 75 04/27/2023    GLUC 90 04/27/2023    CALCIUM 9 1 04/27/2023    AST 30 04/27/2023    ALT 53 (H) 04/27/2023    ALKPHOS 86 04/27/2023    TP 6 2 (L) 04/27/2023    TBILI 0 77 04/27/2023    EGFR 80 04/27/2023   ·   Result Status Patient Facility Result Comment       04/27/2023 1150 04/27/2023 1156 COVID/FLU/RSV [[id number]]   Nares from Nose    In process [address]  Component Value   No component results          04/26/2023 1347 04/26/2023 1432 COVID/FLU/RSV [[id number]]    Nares from Nose    Final result [facility] - copy/paste COVID Guidelines URL to browser: [email or web address]   SARS-CoV-2 assay is a Nucleic Acid Amplification assay intended for the   qualitative detection of nucleic acid from SARS-CoV-2 in nasopharyngeal   swabs  Results are for the presumptive identification of SARS-CoV-2 RNA  Positive results are indicative of infection with SARS-CoV-2, the virus   causing COVID-19, but do not rule out bacterial infection or co-infection   with other viruses  Laboratories within the [place] and its   territories are required to report all positive results to the appropriate   public health authorities  Negative results do not preclude SARS-CoV-2   infection and should not be used as the sole basis for treatment or other   patient management decisions  Negative results must be combined with   clinical observations, patient history, and epidemiological information  This test has not been FDA cleared or approved  This test has been authorized by FDA under an Emergency Use Authorization   (EUA)  This test is only authorized for the duration of time the   declaration that circumstances exist justifying the authorization of the   emergency use of an in vitro diagnostic tests for detection of SARS-CoV-2   virus and/or diagnosis of COVID-19 infection under section 564(b)(1) of   the Act, 21 U  S C  861XJY-3(Q)(4), unless the authorization is terminated   or revoked sooner  The test has been validated but independent review by FDA   and CLIA is pending  Test performed using [employer] GeneXpert: This RT-PCR assay targets N2,   a region unique to SARS-CoV-2  A conserved region in the E-gene was chosen   for pan-Sarbecovirus detection which includes SARS-CoV-2  According to CMS-2020-01-R, this platform meets the definition of high-throughput technology      Component Value   SARS-CoV-2 Negative   INFLUENZA A PCR Negative   INFLUENZA B PCR Negative   RSV PCR Negative          04/24/2023 1756 04/25/2023 1952 Occult blood 1-3, stool [[id number]]   (Abnormal)   Stool from Rectum    Preliminary result [address]  Component Value   Fecal Occult Blood "Diagnostic Positive Abnormal  P   Fecal Occult Blood Diagnostic 2 Positive Abnormal  P    Fecal Occult Blood Diagnostic 3 -- P            ·     Incidental Findings:   · As mentioned above  · I reviewed the above mentioned incidental findings with the patient and/or family and they expressed understanding  Test Results Pending at Discharge (will require follow up): · Biopsy from polypectomy which will be followed by GI     Outpatient Tests Requested:  · CBC in 1 week    Complications: Hematochezia    Reason for Admission: Syncope    Hospital Course: Pamela Wang is a 72 y o  female patient who originally presented to the hospital on 4/19/2023 due to syncope  Unknown etiology, suspect dehydration due to recent diarrhea  Trauma work-up negative, patient had a scalp laceration which was taken care of by staples and Dermabond, staples removed on discharge date  Also developed hematochezia, status post EGD, it was suspected most likely secondary to oozing from polypectomy, which improved, hemoglobin remained stable  As per GI recommendation, continue PPI twice daily for 2 weeks, then daily basis  Repeat CBC in 1 week  Also evaluated by PT OT recommends rehab placement  Patient will be discharged to [facility]  Lab results, imaging findings, treatment plan and option discussed in details with patient and her son on the bedside  Verbalizes to understand and agrees  Please see above list of diagnoses and related plan for additional information  Condition at Discharge: stable    Discharge Day Visit / Exam:   Subjective: Seen and evaluated and examined  Resting comfortably  Still complaining of dizziness especially when she tried to move around or change position    Vitals: Blood Pressure: 150/83 (04/27/23 0719)  Pulse: 82 (04/27/23 0719)  Temperature: 97 9 °F (36 6 °C) (04/27/23 0719)  Temp Source: Temporal (04/27/23 0719)  Respirations: 18 (04/27/23 0719)  Height: 5' 4\" (162 6 cm) " (04/21/23 0908)  Weight - Scale: 75 8 kg (167 lb) (04/21/23 0908)  SpO2: 93 % (04/27/23 0719)  Exam:   Physical Exam  Vitals and nursing note reviewed  Constitutional:       Appearance: Normal appearance  She is obese  She is not ill-appearing or diaphoretic  HENT:      Nose: Nose normal  No congestion  Mouth/Throat:      Mouth: Mucous membranes are moist       Pharynx: Oropharynx is clear  No oropharyngeal exudate  Eyes:      General: No scleral icterus  Left eye: No discharge  Extraocular Movements: Extraocular movements intact  Conjunctiva/sclera: Conjunctivae normal       Pupils: Pupils are equal, round, and reactive to light  Cardiovascular:      Rate and Rhythm: Normal rate  Heart sounds: Normal heart sounds  No murmur heard  No friction rub  No gallop  Pulmonary:      Effort: Pulmonary effort is normal  No respiratory distress  Breath sounds: No stridor  No wheezing or rhonchi  Abdominal:      General: Abdomen is flat  Bowel sounds are normal  There is no distension  Palpations: There is no mass  Tenderness: There is no abdominal tenderness  Musculoskeletal:         General: No swelling, tenderness, deformity or signs of injury  Normal range of motion  Cervical back: Normal range of motion  Skin:     General: Skin is warm  Capillary Refill: Capillary refill takes less than 2 seconds  Neurological:      General: No focal deficit present  Mental Status: She is alert and oriented to person, place, and time  Cranial Nerves: No cranial nerve deficit  Sensory: No sensory deficit  Motor: No weakness  Coordination: Coordination normal          Discussion with Family: Updated  (son) at bedside  Discharge instructions/Information to patient and family:   See after visit summary for information provided to patient and family        Provisions for Follow-Up Care:  See after visit summary for information related to follow-up care and any pertinent home health orders  Disposition:   Vianney Hung Godoy at [facility] Readmission: Condition get worse     Discharge Statement:  Greater than 50% of the total time was spent examining patient, answering all patient questions, arranging and discussing plan of care with patient as well as directly providing post-discharge instructions  Additional time then spent on discharge activities  Discharge Medications:  See after visit summary for reconciled discharge medications provided to patient and/or family        **Please Note: This note may have been constructed using a voice recognition system**

## 2023-04-28 LAB
HEMOCCULT STL QL: POSITIVE
HEMOCCULT STL QL: POSITIVE

## 2023-05-10 ENCOUNTER — APPOINTMENT (EMERGENCY)
Dept: RADIOLOGY | Facility: HOSPITAL | Age: 73
End: 2023-05-10

## 2023-05-10 ENCOUNTER — APPOINTMENT (EMERGENCY)
Dept: CT IMAGING | Facility: HOSPITAL | Age: 73
End: 2023-05-10

## 2023-05-10 ENCOUNTER — HOSPITAL ENCOUNTER (OUTPATIENT)
Facility: HOSPITAL | Age: 73
Setting detail: OBSERVATION
Discharge: NURSING, REHAB OR LONG-TERM CARE | End: 2023-05-11
Attending: EMERGENCY MEDICINE | Admitting: INTERNAL MEDICINE

## 2023-05-10 DIAGNOSIS — H53.47 HEMIANOPSIA: ICD-10-CM

## 2023-05-10 DIAGNOSIS — F31.9 BIPOLAR DEPRESSION (HCC): ICD-10-CM

## 2023-05-10 DIAGNOSIS — H55.09 VERTICAL NYSTAGMUS: ICD-10-CM

## 2023-05-10 DIAGNOSIS — R42 DIZZINESS: ICD-10-CM

## 2023-05-10 DIAGNOSIS — I10 PRIMARY HYPERTENSION: ICD-10-CM

## 2023-05-10 DIAGNOSIS — E87.6 HYPOKALEMIA: Primary | ICD-10-CM

## 2023-05-10 DIAGNOSIS — R27.0 ATAXIA: ICD-10-CM

## 2023-05-10 PROBLEM — R11.2 NAUSEA AND VOMITING: Status: ACTIVE | Noted: 2023-05-10

## 2023-05-10 PROBLEM — R51.9 HEADACHE: Status: ACTIVE | Noted: 2023-05-10

## 2023-05-10 LAB
25(OH)D3 SERPL-MCNC: 57.6 NG/ML (ref 30–100)
ABO GROUP BLD: NORMAL
ABO GROUP BLD: NORMAL
ALBUMIN SERPL BCP-MCNC: 4.5 G/DL (ref 3.5–5)
ALP SERPL-CCNC: 108 U/L (ref 34–104)
ALT SERPL W P-5'-P-CCNC: 19 U/L (ref 7–52)
ANION GAP SERPL CALCULATED.3IONS-SCNC: 12 MMOL/L (ref 4–13)
APTT PPP: 26 SECONDS (ref 23–37)
AST SERPL W P-5'-P-CCNC: 16 U/L (ref 13–39)
BASOPHILS # BLD AUTO: 0.03 THOUSANDS/ÂΜL (ref 0–0.1)
BASOPHILS NFR BLD AUTO: 0 % (ref 0–1)
BILIRUB SERPL-MCNC: 0.67 MG/DL (ref 0.2–1)
BILIRUB UR QL STRIP: NEGATIVE
BLD GP AB SCN SERPL QL: NEGATIVE
BUN SERPL-MCNC: 9 MG/DL (ref 5–25)
CALCIUM SERPL-MCNC: 9.6 MG/DL (ref 8.4–10.2)
CARDIAC TROPONIN I PNL SERPL HS: 4 NG/L
CHLORIDE SERPL-SCNC: 107 MMOL/L (ref 96–108)
CLARITY UR: ABNORMAL
CO2 SERPL-SCNC: 24 MMOL/L (ref 21–32)
COLOR UR: YELLOW
CREAT SERPL-MCNC: 0.69 MG/DL (ref 0.6–1.3)
EOSINOPHIL # BLD AUTO: 0 THOUSAND/ÂΜL (ref 0–0.61)
EOSINOPHIL NFR BLD AUTO: 0 % (ref 0–6)
ERYTHROCYTE [DISTWIDTH] IN BLOOD BY AUTOMATED COUNT: 15.7 % (ref 11.6–15.1)
FLUAV RNA RESP QL NAA+PROBE: NEGATIVE
FLUBV RNA RESP QL NAA+PROBE: NEGATIVE
FOLATE SERPL-MCNC: 10 NG/ML (ref 3.1–17.5)
GFR SERPL CREATININE-BSD FRML MDRD: 87 ML/MIN/1.73SQ M
GLUCOSE SERPL-MCNC: 114 MG/DL (ref 65–140)
GLUCOSE UR STRIP-MCNC: NEGATIVE MG/DL
HCT VFR BLD AUTO: 38.8 % (ref 34.8–46.1)
HGB BLD-MCNC: 12.4 G/DL (ref 11.5–15.4)
HGB UR QL STRIP.AUTO: NEGATIVE
IMM GRANULOCYTES # BLD AUTO: 0.03 THOUSAND/UL (ref 0–0.2)
IMM GRANULOCYTES NFR BLD AUTO: 0 % (ref 0–2)
INR PPP: 0.98 (ref 0.84–1.19)
KETONES UR STRIP-MCNC: ABNORMAL MG/DL
LACTATE SERPL-SCNC: 1 MMOL/L (ref 0.5–2)
LEUKOCYTE ESTERASE UR QL STRIP: NEGATIVE
LYMPHOCYTES # BLD AUTO: 0.78 THOUSANDS/ÂΜL (ref 0.6–4.47)
LYMPHOCYTES NFR BLD AUTO: 11 % (ref 14–44)
MAGNESIUM SERPL-MCNC: 2.2 MG/DL (ref 1.9–2.7)
MCH RBC QN AUTO: 29.3 PG (ref 26.8–34.3)
MCHC RBC AUTO-ENTMCNC: 32 G/DL (ref 31.4–37.4)
MCV RBC AUTO: 92 FL (ref 82–98)
MONOCYTES # BLD AUTO: 0.35 THOUSAND/ÂΜL (ref 0.17–1.22)
MONOCYTES NFR BLD AUTO: 5 % (ref 4–12)
NEUTROPHILS # BLD AUTO: 5.83 THOUSANDS/ÂΜL (ref 1.85–7.62)
NEUTS SEG NFR BLD AUTO: 84 % (ref 43–75)
NITRITE UR QL STRIP: NEGATIVE
NRBC BLD AUTO-RTO: 0 /100 WBCS
PH UR STRIP.AUTO: 8 [PH]
PLATELET # BLD AUTO: 485 THOUSANDS/UL (ref 149–390)
PMV BLD AUTO: 8.9 FL (ref 8.9–12.7)
POTASSIUM SERPL-SCNC: 3.3 MMOL/L (ref 3.5–5.3)
PROT SERPL-MCNC: 7.3 G/DL (ref 6.4–8.4)
PROT UR STRIP-MCNC: NEGATIVE MG/DL
PROTHROMBIN TIME: 13.1 SECONDS (ref 11.6–14.5)
RBC # BLD AUTO: 4.23 MILLION/UL (ref 3.81–5.12)
RH BLD: POSITIVE
RH BLD: POSITIVE
RSV RNA RESP QL NAA+PROBE: NEGATIVE
SARS-COV-2 RNA RESP QL NAA+PROBE: NEGATIVE
SODIUM SERPL-SCNC: 143 MMOL/L (ref 135–147)
SP GR UR STRIP.AUTO: 1.01 (ref 1–1.03)
SPECIMEN EXPIRATION DATE: NORMAL
TSH SERPL DL<=0.05 MIU/L-ACNC: 1.43 UIU/ML (ref 0.45–4.5)
UROBILINOGEN UR QL STRIP.AUTO: 0.2 E.U./DL
WBC # BLD AUTO: 7.02 THOUSAND/UL (ref 4.31–10.16)

## 2023-05-10 RX ORDER — LANOLIN ALCOHOL/MO/W.PET/CERES
6 CREAM (GRAM) TOPICAL
Status: DISCONTINUED | OUTPATIENT
Start: 2023-05-10 | End: 2023-05-11 | Stop reason: HOSPADM

## 2023-05-10 RX ORDER — DIPHENHYDRAMINE HYDROCHLORIDE 50 MG/ML
25 INJECTION INTRAMUSCULAR; INTRAVENOUS ONCE
Status: DISCONTINUED | OUTPATIENT
Start: 2023-05-10 | End: 2023-05-10

## 2023-05-10 RX ORDER — DIPHENHYDRAMINE HYDROCHLORIDE 50 MG/ML
25 INJECTION INTRAMUSCULAR; INTRAVENOUS EVERY 8 HOURS PRN
Status: DISCONTINUED | OUTPATIENT
Start: 2023-05-10 | End: 2023-05-11 | Stop reason: HOSPADM

## 2023-05-10 RX ORDER — MECLIZINE HYDROCHLORIDE 25 MG/1
25 TABLET ORAL EVERY 8 HOURS SCHEDULED
Status: DISCONTINUED | OUTPATIENT
Start: 2023-05-10 | End: 2023-05-11 | Stop reason: HOSPADM

## 2023-05-10 RX ORDER — DEXAMETHASONE SODIUM PHOSPHATE 10 MG/ML
10 INJECTION, SOLUTION INTRAMUSCULAR; INTRAVENOUS ONCE
Status: DISCONTINUED | OUTPATIENT
Start: 2023-05-10 | End: 2023-05-10

## 2023-05-10 RX ORDER — MAGNESIUM SULFATE HEPTAHYDRATE 40 MG/ML
2 INJECTION, SOLUTION INTRAVENOUS ONCE
Status: COMPLETED | OUTPATIENT
Start: 2023-05-10 | End: 2023-05-10

## 2023-05-10 RX ORDER — PANTOPRAZOLE SODIUM 40 MG/1
40 TABLET, DELAYED RELEASE ORAL
Status: DISCONTINUED | OUTPATIENT
Start: 2023-05-11 | End: 2023-05-11 | Stop reason: HOSPADM

## 2023-05-10 RX ORDER — CALCIUM CARBONATE 500 MG/1
1000 TABLET, CHEWABLE ORAL DAILY PRN
Status: DISCONTINUED | OUTPATIENT
Start: 2023-05-10 | End: 2023-05-11 | Stop reason: HOSPADM

## 2023-05-10 RX ORDER — HYDRALAZINE HYDROCHLORIDE 20 MG/ML
10 INJECTION INTRAMUSCULAR; INTRAVENOUS ONCE
Status: COMPLETED | OUTPATIENT
Start: 2023-05-10 | End: 2023-05-10

## 2023-05-10 RX ORDER — LOSARTAN POTASSIUM 25 MG/1
25 TABLET ORAL DAILY
Status: DISCONTINUED | OUTPATIENT
Start: 2023-05-10 | End: 2023-05-11

## 2023-05-10 RX ORDER — POTASSIUM CHLORIDE 14.9 MG/ML
20 INJECTION INTRAVENOUS ONCE
Status: DISCONTINUED | OUTPATIENT
Start: 2023-05-10 | End: 2023-05-10

## 2023-05-10 RX ORDER — METOCLOPRAMIDE HYDROCHLORIDE 5 MG/ML
10 INJECTION INTRAMUSCULAR; INTRAVENOUS ONCE
Status: COMPLETED | OUTPATIENT
Start: 2023-05-10 | End: 2023-05-10

## 2023-05-10 RX ORDER — LORAZEPAM 1 MG/1
2 TABLET ORAL
Status: DISCONTINUED | OUTPATIENT
Start: 2023-05-10 | End: 2023-05-11 | Stop reason: HOSPADM

## 2023-05-10 RX ORDER — PANTOPRAZOLE SODIUM 40 MG/1
40 TABLET, DELAYED RELEASE ORAL
Status: DISCONTINUED | OUTPATIENT
Start: 2023-05-13 | End: 2023-05-11

## 2023-05-10 RX ORDER — POTASSIUM CHLORIDE 20 MEQ/1
40 TABLET, EXTENDED RELEASE ORAL ONCE
Status: COMPLETED | OUTPATIENT
Start: 2023-05-10 | End: 2023-05-10

## 2023-05-10 RX ORDER — PANTOPRAZOLE SODIUM 40 MG/1
40 TABLET, DELAYED RELEASE ORAL
Status: DISCONTINUED | OUTPATIENT
Start: 2023-05-11 | End: 2023-05-10

## 2023-05-10 RX ORDER — LAMOTRIGINE 100 MG/1
200 TABLET ORAL 2 TIMES DAILY
Status: DISCONTINUED | OUTPATIENT
Start: 2023-05-10 | End: 2023-05-11 | Stop reason: HOSPADM

## 2023-05-10 RX ORDER — LORAZEPAM 1 MG/1
1 TABLET ORAL 2 TIMES DAILY
Status: DISCONTINUED | OUTPATIENT
Start: 2023-05-11 | End: 2023-05-11 | Stop reason: HOSPADM

## 2023-05-10 RX ORDER — ACETAMINOPHEN 325 MG/1
650 TABLET ORAL EVERY 6 HOURS PRN
Status: DISCONTINUED | OUTPATIENT
Start: 2023-05-10 | End: 2023-05-11 | Stop reason: HOSPADM

## 2023-05-10 RX ORDER — ENOXAPARIN SODIUM 100 MG/ML
40 INJECTION SUBCUTANEOUS DAILY
Status: DISCONTINUED | OUTPATIENT
Start: 2023-05-11 | End: 2023-05-11 | Stop reason: HOSPADM

## 2023-05-10 RX ORDER — DEXAMETHASONE SODIUM PHOSPHATE 10 MG/ML
10 INJECTION, SOLUTION INTRAMUSCULAR; INTRAVENOUS ONCE
Status: COMPLETED | OUTPATIENT
Start: 2023-05-10 | End: 2023-05-10

## 2023-05-10 RX ORDER — METOCLOPRAMIDE HYDROCHLORIDE 5 MG/ML
10 INJECTION INTRAMUSCULAR; INTRAVENOUS EVERY 8 HOURS SCHEDULED
Status: DISCONTINUED | OUTPATIENT
Start: 2023-05-10 | End: 2023-05-11 | Stop reason: HOSPADM

## 2023-05-10 RX ORDER — AMOXICILLIN 250 MG
1 CAPSULE ORAL DAILY PRN
Status: DISCONTINUED | OUTPATIENT
Start: 2023-05-10 | End: 2023-05-11 | Stop reason: HOSPADM

## 2023-05-10 RX ORDER — ATORVASTATIN CALCIUM 40 MG/1
40 TABLET, FILM COATED ORAL
Status: DISCONTINUED | OUTPATIENT
Start: 2023-05-10 | End: 2023-05-11 | Stop reason: HOSPADM

## 2023-05-10 RX ADMIN — DEXAMETHASONE SODIUM PHOSPHATE 10 MG: 10 INJECTION, SOLUTION INTRAMUSCULAR; INTRAVENOUS at 16:35

## 2023-05-10 RX ADMIN — METOCLOPRAMIDE 10 MG: 5 INJECTION, SOLUTION INTRAMUSCULAR; INTRAVENOUS at 15:02

## 2023-05-10 RX ADMIN — LOSARTAN POTASSIUM 25 MG: 25 TABLET, FILM COATED ORAL at 20:20

## 2023-05-10 RX ADMIN — ATORVASTATIN CALCIUM 40 MG: 40 TABLET, FILM COATED ORAL at 20:20

## 2023-05-10 RX ADMIN — HYDRALAZINE HYDROCHLORIDE 10 MG: 20 INJECTION INTRAMUSCULAR; INTRAVENOUS at 16:34

## 2023-05-10 RX ADMIN — LAMOTRIGINE 200 MG: 100 TABLET ORAL at 20:20

## 2023-05-10 RX ADMIN — SODIUM CHLORIDE 1000 ML: 0.9 INJECTION, SOLUTION INTRAVENOUS at 15:02

## 2023-05-10 RX ADMIN — MAGNESIUM SULFATE HEPTAHYDRATE 2 G: 40 INJECTION, SOLUTION INTRAVENOUS at 20:19

## 2023-05-10 RX ADMIN — POTASSIUM CHLORIDE 40 MEQ: 1500 TABLET, EXTENDED RELEASE ORAL at 20:20

## 2023-05-10 NOTE — ASSESSMENT & PLAN NOTE
Patient with bipolar depression maintained on Lamictal and Ativan 2 mg at bedtime, 1 mg morning and afternoon   PDMP reviewed and no red flags

## 2023-05-10 NOTE — Clinical Note
Case was discussed with Felix Bermudez  and the patient's admission status was agreed to be Admission Status: inpatient status to the service of Dr Felix Bermudez

## 2023-05-10 NOTE — PLAN OF CARE
Problem: PAIN - ADULT  Goal: Verbalizes/displays adequate comfort level or baseline comfort level  Description: Interventions:  - Encourage patient to monitor pain and request assistance  - Assess pain using appropriate pain scale  - Administer analgesics based on type and severity of pain and evaluate response  - Implement non-pharmacological measures as appropriate and evaluate response  - Consider cultural and social influences on pain and pain management  - Notify physician/advanced practitioner if interventions unsuccessful or patient reports new pain  Outcome: Progressing     Problem: INFECTION - ADULT  Goal: Absence or prevention of progression during hospitalization  Description: INTERVENTIONS:  - Assess and monitor for signs and symptoms of infection  - Monitor lab/diagnostic results  - Monitor all insertion sites, i e  indwelling lines, tubes, and drains  - Monitor endotracheal if appropriate and nasal secretions for changes in amount and color  - [place] appropriate cooling/warming therapies per order  - Administer medications as ordered  - Instruct and encourage patient and family to use good hand hygiene technique  - Identify and instruct in appropriate isolation precautions for identified infection/condition  Outcome: Progressing  Goal: Absence of fever/infection during neutropenic period  Description: INTERVENTIONS:  - Monitor WBC    Outcome: Progressing     Problem: SAFETY ADULT  Goal: Patient will remain free of falls  Description: INTERVENTIONS:  - Educate patient/family on patient safety including physical limitations  - Instruct patient to call for assistance with activity   - Consult OT/PT to assist with strengthening/mobility   - Keep Call bell within reach  - Keep bed low and locked with side rails adjusted as appropriate  - Keep care items and personal belongings within reach  - Initiate and maintain comfort rounds  - Make Fall Risk Sign visible to staff  - Offer Toileting every 2 Hours, in advance of need  - Initiate/Maintain bed/chair alarm  - Obtain necessary fall risk management equipment:   - Apply yellow socks and bracelet for high fall risk patients  - Consider moving patient to room near nurses station  Outcome: Progressing  Goal: Maintain or return to baseline ADL function  Description: INTERVENTIONS:  -  Assess patient's ability to carry out ADLs; assess patient's baseline for ADL function and identify physical deficits which impact ability to perform ADLs (bathing, care of mouth/teeth, toileting, grooming, dressing, etc )  - Assess/evaluate cause of self-care deficits   - Assess range of motion  - Assess patient's mobility; develop plan if impaired  - Assess patient's need for assistive devices and provide as appropriate  - Encourage maximum independence but intervene and supervise when necessary  - Involve family in performance of ADLs  - Assess for home care needs following discharge   - Consider OT consult to assist with ADL evaluation and planning for discharge  - Provide patient education as appropriate  Outcome: Progressing  Goal: Maintains/Returns to pre admission functional level  Description: INTERVENTIONS:  - Perform BMAT or MOVE assessment daily    - Set and communicate daily mobility goal to care team and patient/family/caregiver  - Collaborate with rehabilitation services on mobility goals if consulted  - Perform Range of Motion 3 times a day  - Reposition patient every 2 hours    - Dangle patient 3 times a day  - Stand patient 3 times a day  - Ambulate patient 3 times a day  - Out of bed to chair 3 times a day   - Out of bed for meals 3 times a day  - Out of bed for toileting  - Record patient progress and toleration of activity level   Outcome: Progressing     Problem: DISCHARGE PLANNING  Goal: Discharge to home or other facility with appropriate resources  Description: INTERVENTIONS:  - Identify barriers to discharge w/patient and caregiver  - Arrange for needed discharge resources and transportation as appropriate  - Identify discharge learning needs (meds, wound care, etc )  - Arrange for interpretive services to assist at discharge as needed  - Refer to Case Management Department for coordinating discharge planning if the patient needs post-hospital services based on physician/advanced practitioner order or complex needs related to functional status, cognitive ability, or social support system  Outcome: Progressing     Problem: Knowledge Deficit  Goal: Patient/family/caregiver demonstrates understanding of disease process, treatment plan, medications, and discharge instructions  Description: Complete learning assessment and assess knowledge base    Interventions:  - Provide teaching at level of understanding  - Provide teaching via preferred learning methods  Outcome: Progressing     Problem: NEUROSENSORY - ADULT  Goal: Achieves stable or improved neurological status  Description: INTERVENTIONS  - Monitor and report changes in neurological status  - Monitor vital signs such as temperature, blood pressure, glucose, and any other labs ordered   - Initiate measures to prevent increased intracranial pressure  - Monitor for seizure activity and implement precautions if appropriate      Outcome: Progressing  Goal: Remains free of injury related to seizures activity  Description: INTERVENTIONS  - Maintain airway, patient safety  and administer oxygen as ordered  - Monitor patient for seizure activity, document and report duration and description of seizure to physician/advanced practitioner  - If seizure occurs,  ensure patient safety during seizure  - Reorient patient post seizure  - Seizure pads on all 4 side rails  - Instruct patient/family to notify RN of any seizure activity including if an aura is experienced  - Instruct patient/family to call for assistance with activity based on nursing assessment  - Administer anti-seizure medications if ordered    Outcome: Progressing  Goal: Achieves maximal functionality and self care  Description: INTERVENTIONS  - Monitor swallowing and airway patency with patient fatigue and changes in neurological status  - Encourage and assist patient to increase activity and self care     - Encourage visually impaired, hearing impaired and aphasic patients to use assistive/communication devices  Outcome: Progressing     Problem: METABOLIC, FLUID AND ELECTROLYTES - ADULT  Goal: Electrolytes maintained within normal limits  Description: INTERVENTIONS:  - Monitor labs and assess patient for signs and symptoms of electrolyte imbalances  - Administer electrolyte replacement as ordered  - Monitor response to electrolyte replacements, including repeat lab results as appropriate  - Instruct patient on fluid and nutrition as appropriate  Outcome: Progressing  Goal: Fluid balance maintained  Description: INTERVENTIONS:  - Monitor labs   - Monitor I/O and WT  - Instruct patient on fluid and nutrition as appropriate  - Assess for signs & symptoms of volume excess or deficit  Outcome: Progressing  Goal: Glucose maintained within target range  Description: INTERVENTIONS:  - Monitor Blood Glucose as ordered  - Assess for signs and symptoms of hyperglycemia and hypoglycemia  - Administer ordered medications to maintain glucose within target range  - Assess nutritional intake and initiate nutrition service referral as needed  Outcome: Progressing

## 2023-05-10 NOTE — ASSESSMENT & PLAN NOTE
Patient has hypertension on losartan at home  Blood pressure is elevated in the ER, did not take medications this morning due to nausea  Continue losartan

## 2023-05-10 NOTE — ED ATTENDING ATTESTATION
5/10/2023  Trip Martínez DO, saw and evaluated the patient  I have discussed the patient with the resident/non-physician practitioner and agree with the resident's/non-physician practitioner's findings, Plan of Care, and MDM as documented in the resident's/non-physician practitioner's note, except where noted  All available labs and Radiology studies were reviewed  I was present for key portions of any procedure(s) performed by the resident/non-physician practitioner and I was immediately available to provide assistance  At this point I agree with the current assessment done in the Emergency Department  I have conducted an independent evaluation of this patient a history and physical is as follows:    ED Course     72-year-old female presenting to the emergency room  Seen and evaluated with the physician's assistant  Patient presented with headache, dizziness, difficulty ambulation  Patient had been recently seen status post fall and had been admitted to our facility  Had been admitted for syncope and GI bleed  On neuro exam patient demonstrated vertical nystagmus, right-sided gaze hemianopsia and bilateral upper extremity ataxia  Recent CT of the head has been negative for acute process  Given the central neurological findings  We have referred the patient for admission and further evaluation  We have also reevaluated the patient in the emergency room with CT imaging and blood work  Please see the physician assistant's note for further information regarding the studies and final disposition

## 2023-05-10 NOTE — H&P
[address]  H&P  Name: Hemant Lehman 72 y o  female I MRN: [id number]  Unit/Bed#: -01 I Date of Admission: 5/10/2023   Date of Service: 5/10/2023 I Hospital Day: 1      Assessment/Plan   * Headache  Assessment & Plan  · Patient presents from rehab with headache x 1-2, left-sided posterior  · Denies ever having headaches like this before, did recently have admission after fall and hitting head  · Her CT head and MRI during that admission were negative for acute pathologies, CT head repeated in the ER today also negative  · No r new falls or traumas  · Has also been dizzy with associated nausea and vomiting today -states dizziness has been ongoing the last 2 weeks  · Hypertensive on arrival, did not take medications this morning, control blood pressure as may be instigating factor    Headache is improving right now  Do migraine cocktail  Hold off on recurrent MRI given that symptoms are improved and just recently had MRI    Dizziness  Assessment & Plan  · Presents with ongoing dizziness for the last few weeks, worse the last 2 days  · States present with most movement, no dizziness at rest  · Feels like the room is spinning around her  · Per tensive when she arrives, did not take medications today if possible worsening factor for her dizziness hypertensive in the outpatient setting    · Is on meclizine at home-continue  · Head CT negative  · PT/OT    Hypokalemia  Assessment & Plan  Patient presents with nausea vomiting  We will give supplementation for potassium-3 2 on admission    BMP daily    Nausea and vomiting  Assessment & Plan  Patient presents with nausea and vomiting at rehab, states that started this morning  She was unable to hold down her pills    States nausea has resolved, no vomiting since this morning  Reglan as part of headache protocol, recheck EKG in the morning for QTc  No abdominal pain    Diet as tolerated    Severe protein-calorie malnutrition ([facility] [address] )  Assessment & Plan  Malnutrition Findings:                               Nutrition consult      BMI Findings: Body mass index is 27 66 kg/m²  Primary hypertension  Assessment & Plan  Patient has hypertension on losartan at home  Blood pressure is elevated in the ER, did not take medications this morning due to nausea  Continue losartan    Bipolar depression ([facility] [address] )  Assessment & Plan  Patient with bipolar depression maintained on Lamictal and Ativan 2 mg at bedtime, 1 mg morning and afternoon   PDMP reviewed and no red flags       VTE Pharmacologic Prophylaxis: VTE Score: 3 Moderate Risk (Score 3-4) - Pharmacological DVT Prophylaxis Ordered: enoxaparin (Lovenox)  Code Status: Level 1 - Full Code   Discussion with family: Attempted to update  (son) via phone  Left voicemail  Anticipated Length of Stay: Patient will be admitted on an observation basis with an anticipated length of stay of less than 2 midnights secondary to Nausea vomiting headache dizziness  Total Time Spent on Date of Encounter in care of patient: 65 minutes This time was spent on one or more of the following: performing physical exam; counseling and coordination of care; obtaining or reviewing history; documenting in the medical record; reviewing/ordering tests, medications or procedures; communicating with other healthcare professionals and discussing with patient's family/caregivers  Chief Complaint: Headache    History of Present Illness: Kavya Butts is a 72 y o  female with a PMH of recent fall, bipolar disorder, generalized weakness at rehab who presents with headache, nausea vomiting and dizziness x2 days  She has been feeling dizzy for the last few weeks however worse in the last 2 days  Has been having headache in the back of her head on the left side since yesterday, more so early this morning  Headache has started to improve now    Also had multiple episodes of vomiting this morning, unable to tolerate her pills   She now states the nausea is starting to resolve  Never had any abdominal pain, no change in bowel movements  Denies any chest pain or shortness of breath  Has been feeling generally weak and was recently hospitalized and discharged to rehab, states no specific changes to her weakness, just weakness in bilateral legs when she is trying to ambulate       Review of Systems:  Review of Systems   Constitutional: Positive for fatigue  Negative for activity change, chills and fever  HENT: Negative for congestion, rhinorrhea and sore throat  Eyes: Negative for visual disturbance  Respiratory: Negative for cough, chest tightness and shortness of breath  Cardiovascular: Negative for chest pain and palpitations  Gastrointestinal: Positive for nausea and vomiting  Negative for abdominal pain, constipation and diarrhea  Genitourinary: Negative for difficulty urinating, dysuria, frequency and urgency  Musculoskeletal: Negative for arthralgias and myalgias  Skin: Negative for rash  Neurological: Positive for dizziness, weakness and headaches  Negative for seizures and syncope  All other systems reviewed and are negative  Past Medical and Surgical History:   Past Medical History:   Diagnosis Date   • Anxiety    • Breast cancer ([facility] [address] )    • Disease of thyroid gland    • Esophagitis    • Hypertension        Past Surgical History:   Procedure Laterality Date   • BREAST LUMPECTOMY         Meds/Allergies:  Prior to Admission medications    Medication Sig Start Date End Date Taking?  Authorizing Provider   acetaminophen (TYLENOL) 325 mg tablet Take 2 tablets (650 mg total) by mouth every 6 (six) hours as needed for mild pain, headaches or fever 4/24/23   Danis Leslie MD   aspirin (ECOTRIN LOW STRENGTH) 81 mg EC tablet Take 1 tablet (81 mg total) by mouth daily Do not start before April 25, 2023 4/25/23   Danis Leslie MD   atorvastatin (LIPITOR) 40 mg tablet Take 40 mg by mouth daily 12/21/22 Historical Provider, MD   lamoTRIgine (LaMICtal) 200 MG tablet Take 200 mg by mouth 2 (two) times a day    Historical Provider, MD   LORazepam (ATIVAN) 2 mg tablet Take 1 tablet (2 mg total) by mouth daily at bedtime for 10 days 4/24/23 5/4/23  Karen Dash MD   LORazepam (ATIVAN) 2 mg tablet Take 0 5 tablets (1 mg total) by mouth 2 (two) times a day for 10 days Morning and afternoon 4/24/23 5/4/23  Karen Dash MD   losartan (COZAAR) 25 mg tablet Take 25 mg by mouth daily    Historical Provider, MD   meclizine (ANTIVERT) 25 mg tablet Take 1 tablet (25 mg total) by mouth every 8 (eight) hours 4/24/23   Karen Dash MD   melatonin 3 mg Take 2 tablets (6 mg total) by mouth daily at bedtime 4/24/23   Karen Dash MD   pantoprazole (PROTONIX) 40 mg tablet Take 1 tablet 2 times daily for 2 weeks, after that, take 1 tablet daily  4/27/23   Bonifacio Nolan MD   senna-docusate sodium (SENOKOT S) 8 6-50 mg per tablet Take 1 tablet by mouth daily as needed for constipation 4/27/23 5/27/23  Bonifacio Nolan MD     I have reviewed home medications with patient personally  Allergies: Allergies   Allergen Reactions   • Vitamin B12 Hives   • Erythromycin Other (See Comments)     Vomiting  • Escitalopram Other (See Comments)   • Lisinopril Cough   • Other Itching and Dizziness   • Paroxetine Other (See Comments)       Social History:  Marital Status:    Occupation: None  Patient Pre-hospital Living Situation: [facility]: rehab  Patient Pre-hospital Level of Mobility: At rehab due to generalized weakness and lower extremities  Patient Pre-hospital Diet Restrictions: None  Substance Use History:   Social History     Substance and Sexual Activity   Alcohol Use Never     Social History     Tobacco Use   Smoking Status Never   Smokeless Tobacco Never     Social History     Substance and Sexual Activity   Drug Use Never       Family History:  History reviewed  No pertinent family history      Physical Exam: "    Vitals:   Blood Pressure: (!) 182/88 (05/10/23 1816)  Pulse: 92 (05/10/23 1816)  Temperature: 97 7 °F (36 5 °C) (05/10/23 1816)  Temp Source: Temporal (05/10/23 1501)  Respirations: 19 (05/10/23 1630)  Height: 5' 4\" (162 6 cm) (05/10/23 1451)  Weight - Scale: 73 1 kg (161 lb 2 5 oz) (05/10/23 1451)  SpO2: 95 % (05/10/23 1816)    Physical Exam  Vitals and nursing note reviewed  Constitutional:       General: She is not in acute distress  Appearance: Normal appearance  She is ill-appearing (chronically )  HENT:      Head: Normocephalic and atraumatic  Nose: No congestion  Mouth/Throat:      Mouth: Mucous membranes are moist    Eyes:      Conjunctiva/sclera: Conjunctivae normal       Pupils: Pupils are equal, round, and reactive to light  Comments: Slight vertical nystagmus bl   Cardiovascular:      Rate and Rhythm: Normal rate and regular rhythm  Pulses: Normal pulses  Heart sounds: Normal heart sounds  No murmur heard  Pulmonary:      Effort: Pulmonary effort is normal  No respiratory distress  Breath sounds: Normal breath sounds  Abdominal:      General: Bowel sounds are normal       Palpations: Abdomen is soft  Tenderness: There is no abdominal tenderness  Musculoskeletal:         General: Normal range of motion  Right lower leg: No edema  Left lower leg: No edema  Skin:     General: Skin is warm and dry  Neurological:      General: No focal deficit present  Mental Status: She is alert and oriented to person, place, and time        Comments: No focal deficits          Additional Data:     Lab Results:  Results from last 7 days   Lab Units 05/10/23  1459   WBC Thousand/uL 7 02   HEMOGLOBIN g/dL 12 4   HEMATOCRIT % 38 8   PLATELETS Thousands/uL 485*   NEUTROS PCT % 84*   LYMPHS PCT % 11*   MONOS PCT % 5   EOS PCT % 0     Results from last 7 days   Lab Units 05/10/23  1459   SODIUM mmol/L 143   POTASSIUM mmol/L 3 3*   CHLORIDE mmol/L 107   CO2 " mmol/L 24   BUN mg/dL 9   CREATININE mg/dL 0 69   ANION GAP mmol/L 12   CALCIUM mg/dL 9 6   ALBUMIN g/dL 4 5   TOTAL BILIRUBIN mg/dL 0 67   ALK PHOS U/L 108*   ALT U/L 19   AST U/L 16   GLUCOSE RANDOM mg/dL 114     Results from last 7 days   Lab Units 05/10/23  1459   INR  0 98             Results from last 7 days   Lab Units 05/10/23  1459   LACTIC ACID mmol/L 1 0       Lines/Drains:  Invasive Devices     Peripheral Intravenous Line  Duration           Peripheral IV 05/10/23 Right Antecubital <1 day                    Imaging: Reviewed radiology reports from this admission including: CT head  CT head without contrast   Final Result by Adriana Alvarado, DO (05/10 1600)      Stable examination  No acute intracranial abnormality  Workstation performed: [id number]         XR chest 1 view portable    (Results Pending)       EKG and Other Studies Reviewed on Admission:   · EKG: NSR  HR 84     ** Please Note: This note has been constructed using a voice recognition system   **

## 2023-05-10 NOTE — ED PROVIDER NOTES
History  Chief Complaint   Patient presents with   • Headache     Pt arrives from home via EMS with a c/o headache, dizziness and nausea/vomiting since last night  Pt had recent fall down 17 steps and was seen in ED at that time  Patient is a 24-year-old female, presents emerged department today with the complaint of headache x2 days, nausea vomiting and dizziness via EMS from nursing home  Patient charged from the hospital on 4/28  Patient was admitted for syncope and fall  Patient had a head injury  SHe states that she has been dizzy over the last few weeks  He states that over the last 2 days she has had a left-sided posterior headache with nausea vomiting  She states that she has not had any new falls or traumas  She was unable to keep down her medications therefore was sent here for further evaluation  Denies any chest pain, belly pain, back pain  Headache  Pain location:  Occipital  Quality:  Unable to specify  Severity currently:  Unable to specify  Severity at highest:  Unable to specify  Duration:  2 days  Timing:  Constant  Progression:  Worsening  Chronicity:  New  Similar to prior headaches: no    Relieved by:  Nothing  Worsened by:  Nothing  Ineffective treatments:  None tried  Associated symptoms: vomiting    Associated symptoms: no abdominal pain, no back pain, no diarrhea, no eye pain, no facial pain, no neck stiffness, no URI and no weakness    Vomiting:     Duration:  2 days    Timing:  Constant    Progression:  Worsening      Prior to Admission Medications   Prescriptions Last Dose Informant Patient Reported? Taking?    LORazepam (ATIVAN) 2 mg tablet   No No   Sig: Take 1 tablet (2 mg total) by mouth daily at bedtime for 10 days   LORazepam (ATIVAN) 2 mg tablet   No No   Sig: Take 0 5 tablets (1 mg total) by mouth 2 (two) times a day for 10 days Morning and afternoon   acetaminophen (TYLENOL) 325 mg tablet   No No   Sig: Take 2 tablets (650 mg total) by mouth every 6 (six) hours as needed for mild pain, headaches or fever   aspirin (ECOTRIN LOW STRENGTH) 81 mg EC tablet   No No   Sig: Take 1 tablet (81 mg total) by mouth daily Do not start before April 25, 2023  atorvastatin (LIPITOR) 40 mg tablet   Yes No   Sig: Take 40 mg by mouth daily   lamoTRIgine (LaMICtal) 200 MG tablet   Yes No   Sig: Take 200 mg by mouth 2 (two) times a day   losartan (COZAAR) 25 mg tablet   Yes No   Sig: Take 25 mg by mouth daily   meclizine (ANTIVERT) 25 mg tablet   No No   Sig: Take 1 tablet (25 mg total) by mouth every 8 (eight) hours   melatonin 3 mg   No No   Sig: Take 2 tablets (6 mg total) by mouth daily at bedtime   pantoprazole (PROTONIX) 40 mg tablet   No No   Sig: Take 1 tablet 2 times daily for 2 weeks, after that, take 1 tablet daily  senna-docusate sodium (SENOKOT S) 8 6-50 mg per tablet   No No   Sig: Take 1 tablet by mouth daily as needed for constipation      Facility-Administered Medications: None       Past Medical History:   Diagnosis Date   • Anxiety    • Breast cancer ([facility] [address] )    • Disease of thyroid gland    • Esophagitis    • Hypertension        Past Surgical History:   Procedure Laterality Date   • BREAST LUMPECTOMY         History reviewed  No pertinent family history  I have reviewed and agree with the history as documented  E-Cigarette/Vaping   • E-Cigarette Use Never User      E-Cigarette/Vaping Substances     Social History     Tobacco Use   • Smoking status: Never   • Smokeless tobacco: Never   Vaping Use   • Vaping Use: Never used   Substance Use Topics   • Alcohol use: Never   • Drug use: Never       Review of Systems   Eyes: Negative for pain  Gastrointestinal: Positive for vomiting  Negative for abdominal pain and diarrhea  Musculoskeletal: Negative for back pain and neck stiffness  Neurological: Positive for headaches  Negative for weakness  Physical Exam  Physical Exam  Vitals and nursing note reviewed     Constitutional:       General: She is not in acute distress  Appearance: She is well-developed  HENT:      Head: Normocephalic and atraumatic  Eyes:      Extraocular Movements: Extraocular movements intact  Right eye: Nystagmus present  Left eye: Nystagmus present  Conjunctiva/sclera: Conjunctivae normal       Pupils: Pupils are equal, round, and reactive to light  Comments: Vertical nystagmus   Cardiovascular:      Rate and Rhythm: Normal rate and regular rhythm  Heart sounds: No murmur heard  Pulmonary:      Effort: Pulmonary effort is normal  No respiratory distress  Breath sounds: Normal breath sounds  Abdominal:      Palpations: Abdomen is soft  Tenderness: There is no abdominal tenderness  Musculoskeletal:         General: No swelling  Cervical back: Neck supple  Skin:     General: Skin is warm and dry  Capillary Refill: Capillary refill takes less than 2 seconds  Coloration: Skin is pale  Neurological:      Mental Status: She is alert        Coordination: Coordination abnormal  Finger-Nose-Finger Test abnormal       Comments: Is ataxic   Psychiatric:         Mood and Affect: Mood normal          Vital Signs  ED Triage Vitals   Temperature Pulse Respirations Blood Pressure SpO2   05/10/23 1501 05/10/23 1451 05/10/23 1451 05/10/23 1451 05/10/23 1451   98 3 °F (36 8 °C) 88 16 (!) 189/100 95 %      Temp Source Heart Rate Source Patient Position - Orthostatic VS BP Location FiO2 (%)   05/10/23 1501 05/10/23 1451 05/10/23 1451 05/10/23 1451 --   Temporal Monitor Lying Right arm       Pain Score       05/10/23 1451       5           Vitals:    05/10/23 1500 05/10/23 1530 05/10/23 1600 05/10/23 1630   BP: (!) 192/99 168/92 (!) 180/101 (!) 187/91   Pulse: 84 78 82 85   Patient Position - Orthostatic VS:             Visual Acuity      ED Medications  Medications   sodium chloride 0 9 % bolus 1,000 mL (1,000 mL Intravenous New Bag 5/10/23 1502)   metoclopramide (REGLAN) injection 10 mg (10 mg Intravenous Given 5/10/23 1502)   dexamethasone (PF) (DECADRON) injection 10 mg (10 mg Intravenous Given 5/10/23 1635)   hydrALAZINE (APRESOLINE) injection 10 mg (10 mg Intravenous Given 5/10/23 1634)       Diagnostic Studies  Results Reviewed     Procedure Component Value Units Date/Time    FLU/RSV/COVID - if FLU/RSV clinically relevant [[id number]]  (Normal) Collected: 05/10/23 1621    Lab Status: Final result Specimen: Nares from Nose Updated: 05/10/23 1705     SARS-CoV-2 Negative     INFLUENZA A PCR Negative     INFLUENZA B PCR Negative     RSV PCR Negative    Narrative:      FOR PEDIATRIC PATIENTS - copy/paste COVID Guidelines URL to browser: [email or web address]    SARS-CoV-2 assay is a Nucleic Acid Amplification assay intended for the  qualitative detection of nucleic acid from SARS-CoV-2 in nasopharyngeal  swabs  Results are for the presumptive identification of SARS-CoV-2 RNA  Positive results are indicative of infection with SARS-CoV-2, the virus  causing COVID-19, but do not rule out bacterial infection or co-infection  with other viruses  Laboratories within the [place] and its  territories are required to report all positive results to the appropriate  public health authorities  Negative results do not preclude SARS-CoV-2  infection and should not be used as the sole basis for treatment or other  patient management decisions  Negative results must be combined with  clinical observations, patient history, and epidemiological information  This test has not been FDA cleared or approved  This test has been authorized by FDA under an Emergency Use Authorization  (EUA)   This test is only authorized for the duration of time the  declaration that circumstances exist justifying the authorization of the  emergency use of an in vitro diagnostic tests for detection of SARS-CoV-2  virus and/or diagnosis of COVID-19 infection under section 564(b)(1) of  the Act, [place]  360bbb-3(b)(1), unless the authorization is terminated  or revoked sooner  The test has been validated but independent review by FDA  and CLIA is pending  Test performed using [employer] GeneXpert: This RT-PCR assay targets N2,  a region unique to SARS-CoV-2  A conserved region in the E-gene was chosen  for pan-Sarbecovirus detection which includes SARS-CoV-2  According to CMS-2020-01-R, this platform meets the definition of high-throughput technology  UA w Reflex to Microscopic w Reflex to Culture [[id number]]  (Abnormal) Collected: 05/10/23 1621    Lab Status: Final result Specimen: Urine, Clean Catch Updated: 05/10/23 1628     Color, UA Yellow     Clarity, UA Slightly Cloudy     Specific Gravity, UA 1 015     pH, UA 8 0     Leukocytes, UA Negative     Nitrite, UA Negative     Protein, UA Negative mg/dl      Glucose, UA Negative mg/dl      Ketones, UA 15 (1+) mg/dl      Urobilinogen, UA 0 2 E U /dl      Bilirubin, UA Negative     Occult Blood, UA Negative    Vitamin D 25 hydroxy [[id number]] Collected: 05/10/23 1617    Lab Status: In process Specimen: Blood from Arm, Right Updated: 05/10/23 1624    Folate [[id number]] Collected: 05/10/23 1617    Lab Status: In process Specimen: Blood from Arm, Right Updated: 05/10/23 1624    Lamotrigine level [[id number]] Collected: 05/10/23 1617    Lab Status: In process Specimen: Blood from Arm, Right Updated: 05/10/23 1624    Vitamin B1, whole blood [[id number]] Collected: 05/10/23 1617    Lab Status:  In process Specimen: Blood from Arm, Right Updated: 05/10/23 1624    APTT [[id number]]  (Normal) Collected: 05/10/23 1459    Lab Status: Final result Specimen: Blood from Arm, Right Updated: 05/10/23 1531     PTT 26 seconds     Protime-INR [[id number]]  (Normal) Collected: 05/10/23 1459    Lab Status: Final result Specimen: Blood from Arm, Right Updated: 05/10/23 1530     Protime 13 1 seconds      INR 0 98    HS Troponin 0hr (reflex protocol) [[id number]]  (Normal) Collected: 05/10/23 1459    Lab Status: Final result Specimen: Blood from Arm, Right Updated: 05/10/23 1530     hs TnI 0hr 4 ng/L     Comprehensive metabolic panel [[id number]]  (Abnormal) Collected: 05/10/23 1459    Lab Status: Final result Specimen: Blood from Arm, Right Updated: 05/10/23 1522     Sodium 143 mmol/L      Potassium 3 3 mmol/L      Chloride 107 mmol/L      CO2 24 mmol/L      ANION GAP 12 mmol/L      BUN 9 mg/dL      Creatinine 0 69 mg/dL      Glucose 114 mg/dL      Calcium 9 6 mg/dL      AST 16 U/L      ALT 19 U/L      Alkaline Phosphatase 108 U/L      Total Protein 7 3 g/dL      Albumin 4 5 g/dL      Total Bilirubin 0 67 mg/dL      eGFR 87 ml/min/1 73sq m     Narrative:      [place] guidelines for Chronic Kidney Disease (CKD):   •  Stage 1 with normal or high GFR (GFR > 90 mL/min/1 73 square meters)  •  Stage 2 Mild CKD (GFR = 60-89 mL/min/1 73 square meters)  •  Stage 3A Moderate CKD (GFR = 45-59 mL/min/1 73 square meters)  •  Stage 3B Moderate CKD (GFR = 30-44 mL/min/1 73 square meters)  •  Stage 4 Severe CKD (GFR = 15-29 mL/min/1 73 square meters)  •  Stage 5 End Stage CKD (GFR <15 mL/min/1 73 square meters)  Note: GFR calculation is accurate only with a steady state creatinine    Magnesium [[id number]]  (Normal) Collected: 05/10/23 1459    Lab Status: Final result Specimen: Blood from Arm, Right Updated: 05/10/23 1522     Magnesium 2 2 mg/dL     Lactic acid, plasma (w/reflex if result > 2 0) [[id number]]  (Normal) Collected: 05/10/23 1459    Lab Status: Final result Specimen: Blood from Arm, Right Updated: 05/10/23 1521     LACTIC ACID 1 0 mmol/L     Narrative:      Result may be elevated if tourniquet was used during collection      CBC and differential [[id number]]  (Abnormal) Collected: 05/10/23 1459    Lab Status: Final result Specimen: Blood from Arm, Right Updated: 05/10/23 1508     WBC 7 02 Thousand/uL      RBC 4 23 Million/uL      Hemoglobin 12 4 g/dL Hematocrit 38 8 %      MCV 92 fL      MCH 29 3 pg      MCHC 32 0 g/dL      RDW 15 7 %      MPV 8 9 fL      Platelets 676 Thousands/uL      nRBC 0 /100 WBCs      Neutrophils Relative 84 %      Immat GRANS % 0 %      Lymphocytes Relative 11 %      Monocytes Relative 5 %      Eosinophils Relative 0 %      Basophils Relative 0 %      Neutrophils Absolute 5 83 Thousands/µL      Immature Grans Absolute 0 03 Thousand/uL      Lymphocytes Absolute 0 78 Thousands/µL      Monocytes Absolute 0 35 Thousand/µL      Eosinophils Absolute 0 00 Thousand/µL      Basophils Absolute 0 03 Thousands/µL                  CT head without contrast   Final Result by Haider Rodgers DO (05/10 1600)      Stable examination  No acute intracranial abnormality  Workstation performed: [id number]         XR chest 1 view portable    (Results Pending)              Procedures  ECG 12 Lead Documentation Only    Date/Time: 5/10/2023 5:02 PM  Performed by: Marko Broderick PA-C  Authorized by: Marko Broderick PA-C     Indications / Diagnosis:  Dizziness  ECG reviewed by me, the ED Provider: yes    Patient location:  ED  Previous ECG:     Previous ECG:  Unavailable  Interpretation:     Interpretation: abnormal    Rate:     ECG rate:  84    ECG rate assessment: normal    T waves:     T waves: non-specific               ED Course  ED Course as of 05/10/23 1706   Wed May 10, 2023   1548 Potassium(!): 3 3   1636 Has ataxia on physical examination and vertical nystagmus  Still complaining of headache  Gave injection of Decadron  Spoke with hospitalist for admission due to vertical nystagmus and ataxia with nausea vomiting                                             Medical Decision Making  Patient is a 79-year-old female, presents emerged department today with the complaint of headache x2 days, nausea vomiting and dizziness  Patient charged from the hospital on 4/28  Patient was admitted for syncope and fall    Patient had a head injury  He states that she has been dizzy over the last few weeks  He states that over the last 2 days she has had a left-sided posterior headache with nausea vomiting  She states that she has not had any new falls or traumas  She was unable to keep down her medications therefore was sent here for further evaluation  Denies any chest pain, belly pain, back pain  Patient on physical examination had vertical nystagmus, ataxia,cerebellar  testing on physical examination was abnormal   The patient lab work overall was unremarkable except for a hypokalemia  Imaging studies of CT head brain without contrast was normal   Patient was hypertensive  Discussed with hospitalist service for admission due to new vertical nystagmus  preevious admission did not note any critical nystagmus  Amount and/or Complexity of Data Reviewed  Labs: ordered  Decision-making details documented in ED Course  Radiology: ordered  Decision-making details documented in ED Course  ECG/medicine tests: ordered  Decision-making details documented in ED Course  Risk  Prescription drug management  Decision regarding hospitalization  Disposition  Final diagnoses:   Hypokalemia   Vertical nystagmus   Hemianopsia   Ataxia     Time reflects when diagnosis was documented in both MDM as applicable and the Disposition within this note     Time User Action Codes Description Comment    5/10/2023  3:53 PM Holger Schneider Add [E87 6] Hypokalemia     5/10/2023  3:53 PM Holger Schneider Add [H55 09] Vertical nystagmus     5/10/2023  4:47 PM Luan Pall Add [H53 47] Hemianopsia     5/10/2023  4:47 PM La Verkin Pall Add [R27 0] Ataxia       ED Disposition     ED Disposition   Admit    Condition   Stable    Date/Time   Wed May 10, 2023  4:28 PM    Comment   Case was discussed with Mel Patel  and the patient's admission status was agreed to be Admission Status: observation status to the service of Dr Mel Patel              Follow-up Information    None         Patient's Medications   Discharge Prescriptions    No medications on file       No discharge procedures on file      PDMP Review       Value Time User    PDMP Reviewed  Yes 4/27/2023 12:28 PM Roopa Pérez MD          ED Provider  Electronically Signed by           Nadine Lam PA-C  05/10/23 2283

## 2023-05-10 NOTE — ASSESSMENT & PLAN NOTE
Malnutrition Findings:                               Nutrition consult      BMI Findings: Body mass index is 27 66 kg/m²

## 2023-05-10 NOTE — ASSESSMENT & PLAN NOTE
Patient presents with nausea and vomiting at rehab, states that started this morning  She was unable to hold down her pills    States nausea has resolved, no vomiting since this morning  Reglan as part of headache protocol, recheck EKG in the morning for QTc  No abdominal pain    Diet as tolerated

## 2023-05-10 NOTE — ASSESSMENT & PLAN NOTE
· Presents with ongoing dizziness for the last few weeks, worse the last 2 days  · States present with most movement, no dizziness at rest  · Feels like the room is spinning around her  · Per tensive when she arrives, did not take medications today if possible worsening factor for her dizziness hypertensive in the outpatient setting    · Is on meclizine at home-continue  · Head CT negative  · PT/OT

## 2023-05-10 NOTE — ASSESSMENT & PLAN NOTE
Patient presents with nausea vomiting  We will give supplementation for potassium-3 2 on admission    BMP daily

## 2023-05-10 NOTE — ASSESSMENT & PLAN NOTE
· Patient presents from rehab with headache x 1-2, left-sided posterior  · Denies ever having headaches like this before, did recently have admission after fall and hitting head  · Her CT head and MRI during that admission were negative for acute pathologies, CT head repeated in the ER today also negative  · No r new falls or traumas  · Has also been dizzy with associated nausea and vomiting today -states dizziness has been ongoing the last 2 weeks  · Hypertensive on arrival, did not take medications this morning, control blood pressure as may be instigating factor    Headache is improving right now  Do migraine cocktail  Hold off on recurrent MRI given that symptoms are improved and just recently had MRI

## 2023-05-11 VITALS
TEMPERATURE: 97.7 F | OXYGEN SATURATION: 94 % | SYSTOLIC BLOOD PRESSURE: 139 MMHG | BODY MASS INDEX: 27.51 KG/M2 | WEIGHT: 161.16 LBS | RESPIRATION RATE: 17 BRPM | HEART RATE: 90 BPM | HEIGHT: 64 IN | DIASTOLIC BLOOD PRESSURE: 82 MMHG

## 2023-05-11 LAB
ANION GAP SERPL CALCULATED.3IONS-SCNC: 9 MMOL/L (ref 4–13)
BUN SERPL-MCNC: 10 MG/DL (ref 5–25)
CALCIUM SERPL-MCNC: 9.2 MG/DL (ref 8.4–10.2)
CHLORIDE SERPL-SCNC: 112 MMOL/L (ref 96–108)
CO2 SERPL-SCNC: 20 MMOL/L (ref 21–32)
CREAT SERPL-MCNC: 0.68 MG/DL (ref 0.6–1.3)
ERYTHROCYTE [DISTWIDTH] IN BLOOD BY AUTOMATED COUNT: 15.9 % (ref 11.6–15.1)
GFR SERPL CREATININE-BSD FRML MDRD: 88 ML/MIN/1.73SQ M
GLUCOSE SERPL-MCNC: 104 MG/DL (ref 65–140)
HCT VFR BLD AUTO: 36.3 % (ref 34.8–46.1)
HGB BLD-MCNC: 11.4 G/DL (ref 11.5–15.4)
MAGNESIUM SERPL-MCNC: 2.7 MG/DL (ref 1.9–2.7)
MCH RBC QN AUTO: 29.4 PG (ref 26.8–34.3)
MCHC RBC AUTO-ENTMCNC: 31.4 G/DL (ref 31.4–37.4)
MCV RBC AUTO: 94 FL (ref 82–98)
PLATELET # BLD AUTO: 468 THOUSANDS/UL (ref 149–390)
PMV BLD AUTO: 8.8 FL (ref 8.9–12.7)
POTASSIUM SERPL-SCNC: 4.3 MMOL/L (ref 3.5–5.3)
RBC # BLD AUTO: 3.88 MILLION/UL (ref 3.81–5.12)
SODIUM SERPL-SCNC: 141 MMOL/L (ref 135–147)
WBC # BLD AUTO: 7.15 THOUSAND/UL (ref 4.31–10.16)

## 2023-05-11 RX ORDER — LORAZEPAM 2 MG/1
2 TABLET ORAL
Qty: 5 TABLET | Refills: 0 | Status: SHIPPED | OUTPATIENT
Start: 2023-05-11 | End: 2023-05-16

## 2023-05-11 RX ORDER — LOSARTAN POTASSIUM 50 MG/1
50 TABLET ORAL DAILY
Qty: 30 TABLET | Refills: 0 | Status: SHIPPED | OUTPATIENT
Start: 2023-05-12 | End: 2023-06-11

## 2023-05-11 RX ORDER — LOSARTAN POTASSIUM 25 MG/1
25 TABLET ORAL ONCE
Status: COMPLETED | OUTPATIENT
Start: 2023-05-11 | End: 2023-05-11

## 2023-05-11 RX ORDER — HYDROCHLOROTHIAZIDE 12.5 MG/1
12.5 TABLET ORAL DAILY
Qty: 30 TABLET | Refills: 0 | Status: SHIPPED | OUTPATIENT
Start: 2023-05-11

## 2023-05-11 RX ORDER — HYDROCHLOROTHIAZIDE 12.5 MG/1
12.5 TABLET ORAL DAILY
Status: DISCONTINUED | OUTPATIENT
Start: 2023-05-11 | End: 2023-05-11 | Stop reason: HOSPADM

## 2023-05-11 RX ORDER — LORAZEPAM 2 MG/1
1 TABLET ORAL 2 TIMES DAILY
Qty: 5 TABLET | Refills: 0 | Status: SHIPPED | OUTPATIENT
Start: 2023-05-11 | End: 2023-05-16

## 2023-05-11 RX ORDER — AMLODIPINE BESYLATE 5 MG/1
5 TABLET ORAL DAILY
Status: DISCONTINUED | OUTPATIENT
Start: 2023-05-11 | End: 2023-05-11

## 2023-05-11 RX ORDER — LOSARTAN POTASSIUM 50 MG/1
100 TABLET ORAL DAILY
Status: DISCONTINUED | OUTPATIENT
Start: 2023-05-12 | End: 2023-05-11

## 2023-05-11 RX ORDER — LOSARTAN POTASSIUM 50 MG/1
50 TABLET ORAL DAILY
Status: DISCONTINUED | OUTPATIENT
Start: 2023-05-12 | End: 2023-05-11 | Stop reason: HOSPADM

## 2023-05-11 RX ADMIN — HYDROCHLOROTHIAZIDE 12.5 MG: 12.5 TABLET ORAL at 11:56

## 2023-05-11 RX ADMIN — LOSARTAN POTASSIUM 25 MG: 25 TABLET, FILM COATED ORAL at 08:24

## 2023-05-11 RX ADMIN — PANTOPRAZOLE SODIUM 40 MG: 40 TABLET, DELAYED RELEASE ORAL at 06:35

## 2023-05-11 RX ADMIN — METOCLOPRAMIDE 10 MG: 5 INJECTION, SOLUTION INTRAMUSCULAR; INTRAVENOUS at 15:35

## 2023-05-11 RX ADMIN — LORAZEPAM 1 MG: 1 TABLET ORAL at 11:56

## 2023-05-11 RX ADMIN — ASPIRIN 81 MG: 81 TABLET, COATED ORAL at 08:23

## 2023-05-11 RX ADMIN — LORAZEPAM 2 MG: 1 TABLET ORAL at 00:00

## 2023-05-11 RX ADMIN — LOSARTAN POTASSIUM 25 MG: 25 TABLET, FILM COATED ORAL at 11:56

## 2023-05-11 RX ADMIN — SODIUM CHLORIDE 500 MG: 0.9 INJECTION, SOLUTION INTRAVENOUS at 09:20

## 2023-05-11 RX ADMIN — ATORVASTATIN CALCIUM 40 MG: 40 TABLET, FILM COATED ORAL at 15:35

## 2023-05-11 RX ADMIN — LORAZEPAM 1 MG: 1 TABLET ORAL at 06:36

## 2023-05-11 RX ADMIN — METOCLOPRAMIDE 10 MG: 5 INJECTION, SOLUTION INTRAMUSCULAR; INTRAVENOUS at 06:36

## 2023-05-11 RX ADMIN — ENOXAPARIN SODIUM 40 MG: 40 INJECTION SUBCUTANEOUS at 08:24

## 2023-05-11 RX ADMIN — METOCLOPRAMIDE 10 MG: 5 INJECTION, SOLUTION INTRAMUSCULAR; INTRAVENOUS at 00:00

## 2023-05-11 RX ADMIN — LAMOTRIGINE 200 MG: 100 TABLET ORAL at 08:23

## 2023-05-11 RX ADMIN — PANTOPRAZOLE SODIUM 40 MG: 40 TABLET, DELAYED RELEASE ORAL at 15:35

## 2023-05-11 RX ADMIN — Medication 6 MG: at 00:00

## 2023-05-11 NOTE — ASSESSMENT & PLAN NOTE
· Presents with ongoing dizziness for the last few weeks, worse the last 2 days  · States present with most movement, no dizziness at rest  · Suspect likely BPPV  · We will evaluate with CT imaging, prior brain MRI completed on 04/20 with no acute processes  · Recommend meclizine as needed and as needed for vertigo, ambulatory referral for ENT  · PT evaluation with vestibular therapy recommended

## 2023-05-11 NOTE — DISCHARGE SUMMARY
[address]  Discharge- Thalia Paredes 1950, 72 y o  female MRN: [id number]  Unit/Bed#: -01 Encounter: [id number]  Primary Care Provider: Birgit Gaffney MD   Date and time admitted to hospital: 5/10/2023  2:46 PM    Hypokalemia  Assessment & Plan  Repleted    Dizziness  Assessment & Plan  · Presents with ongoing dizziness for the last few weeks, worse the last 2 days  · States present with most movement, no dizziness at rest  · Suspect likely BPPV  · We will evaluate with CT imaging, prior brain MRI completed on 04/20 with no acute processes  · Recommend meclizine as needed and as needed for vertigo, ambulatory referral for ENT  · PT evaluation with vestibular therapy recommended    Nausea and vomiting  Assessment & Plan  Now resolved    Severe protein[facility]calorie malnutrition ([facility] )  Assessment & Plan  Malnutrition Findings:                               Nutrition consult      BMI Findings: Body mass index is 27 66 kg/m²         Primary hypertension  Assessment & Plan  Patient has hypertension on losartan at home  Blood pressure is elevated in the ER, did not take medications this morning due to nausea  Continue losartan 50mg daily    Bipolar depression ([facility] )  Assessment & Plan  Patient with bipolar depression maintained on Lamictal and Ativan 2 mg at bedtime, 1 mg morning and afternoon   PDMP reviewed and no red flags    * Headache  Assessment & Plan  · Patient presents from rehab with headache x 1-2, left-sided posterior  Migraine cocktail completed, headache has now resolved      Discharging Physician / Practitioner: Rachelle Pabon MD  PCP: Birgit Gaffney MD  Admission Date:   Admission Orders (From admission, onward)     Ordered        05/10/23 1629  Place in Observation  Once            05/10/23 1624  INPATIENT ADMISSION  Once,   Status:  Canceled                      Discharge Date: 05/11/23    Medical Problems     Resolved Problems  Date Reviewed: 5/11/2023   None Consultations During Hospital Stay:  · none    Procedures Performed:   · none    Significant Findings / Test Results:   XR chest 1 view portable    Result Date: 5/11/2023  Impression: No acute cardiopulmonary disease  Workstation performed: [id number]     CT head without contrast    Result Date: 5/10/2023  · Impression: Stable examination  No acute intracranial abnormality  Workstation performed: [id number]   ·     Incidental Findings:   · none     Test Results Pending at Discharge (will require follow up):   · none     Outpatient Tests Requested:  · none    Complications:  none    Reason for Admission: Headache    Hospital Course: Tanner Arrieta is a 72 y o  female patient who originally presented to the hospital on 5/10/2023 due to headache  Patient reportedly had episodes of nausea vomiting and dizziness ongoing for the past several days  She was recently seen here and discharged to rehab  She reported her symptoms were worse when she got up and laid down, with symptoms of the room spinning  She subsequently was nauseous and vomited  CT scan was negative for any acute processes  Previously seen here for possible syncopal episode 3 weeks prior, brain MRI then showed no acute stroke or mass  Suspect the patient's symptoms likely due to peripheral vertigo, likely BPPV  Treated with meclizine, p o  Ativan  Her symptoms had resolved, but though patient recommended to continue physical therapy with vestibular  Follow-up with ENT outpatient with referral given on discharge  Please see above list of diagnoses and related plan for additional information  Condition at Discharge: fair     Discharge Day Visit / Exam:     Subjective:    Patient reports doing well today, headache has resolved  Denying any chest pain, shortness, further nausea or vomiting  Still have episodes of dizziness upon getting up  Was able to work with physical therapy today and was in assist of 1    Vitals: Blood Pressure: "(!) 175/88 (05/10/23 2211)  Pulse: 90 (05/10/23 2211)  Temperature: 97 9 °F (36 6 °C) (05/10/23 2211)  Temp Source: Temporal (05/10/23 1501)  Respirations: 17 (05/10/23 2211)  Height: 5' 4\" (162 6 cm) (05/10/23 1451)  Weight - Scale: 73 1 kg (161 lb 2 5 oz) (05/10/23 1451)  SpO2: 94 % (05/10/23 2211)  Exam:   Physical Exam  Vitals and nursing note reviewed  Constitutional:       Appearance: Normal appearance  She is normal weight  HENT:      Head: Normocephalic and atraumatic  Eyes:      General: No scleral icterus  Conjunctiva/sclera: Conjunctivae normal    Cardiovascular:      Rate and Rhythm: Normal rate and regular rhythm  Pulses: Normal pulses  Pulmonary:      Breath sounds: Normal breath sounds  No wheezing or rhonchi  Abdominal:      General: Bowel sounds are normal  There is no distension  Palpations: Abdomen is soft  Tenderness: There is no abdominal tenderness  Musculoskeletal:         General: No swelling  Skin:     General: Skin is warm and dry  Neurological:      General: No focal deficit present  Mental Status: She is alert  Mental status is at baseline  Discharge instructions/Information to patient and family:   See after visit summary for information provided to patient and family  Provisions for Follow-Up Care:  See after visit summary for information related to follow-up care and any pertinent home health orders  Disposition:     Home    Planned Readmission: none     Discharge Statement:  I spent 45 minutes discharging the patient  This time was spent on the day of discharge  I had direct contact with the patient on the day of discharge  Greater than 50% of the total time was spent examining patient, answering all patient questions, arranging and discussing plan of care with patient as well as directly providing post-discharge instructions  Additional time then spent on discharge activities      Discharge Medications:  See after visit summary " for reconciled discharge medications provided to patient and family        ** Please Note: This note has been constructed using a voice recognition system **

## 2023-05-11 NOTE — PLAN OF CARE
Problem: PHYSICAL THERAPY ADULT  Goal: Performs mobility at highest level of function for planned discharge setting  See evaluation for individualized goals  Description: Treatment/Interventions: ADL retraining, Functional transfer training, LE strengthening/ROM, Elevations, Therapeutic exercise, Endurance training, Patient/family training, Equipment eval/education, Bed mobility, Gait training, Compensatory technique education, Spoke to nursing, Spoke to case management, OT  Equipment Recommended:  (TBD by rehab)       See flowsheet documentation for full assessment, interventions and recommendations  5/91/3501 2321 by Lizy Martínez PT  Note: Prognosis: Good  Problem List: Decreased strength, Decreased endurance, Impaired balance, Decreased mobility, Impaired judgement, Decreased safety awareness  Pt tolerated session poorly requiring increased asisstance to complete transfers and ambulation associated wiht dizziness  She demonstrates sudden onset of swaying ant/post and laterally wiht inc tremor  She requires cues to keep legs still and is able to after verbal cues  She requires increased verbal cues for safety  She is limited by decreased strength, balance, endurance  She will continue to benefit form PT services to maximize LOF  Barriers to Discharge: Decreased caregiver support, Inaccessible home environment  Barriers to Discharge Comments: requires assistance to complete mobility  PT Discharge Recommendation: Post acute rehabilitation services    See flowsheet documentation for full assessment

## 2023-05-11 NOTE — PLAN OF CARE
Problem: OCCUPATIONAL THERAPY ADULT  Goal: Performs self-care activities at highest level of function for planned discharge setting  See evaluation for individualized goals  Description: Treatment Interventions: ADL retraining, Functional transfer training, UE strengthening/ROM, Endurance training, Patient/family training, Equipment evaluation/education, Neuromuscular reeducation, Compensatory technique education, Continued evaluation, Activityengagement, Energy conservation          See flowsheet documentation for full assessment, interventions and recommendations  5/11/2023 1235 by Tonya Jaquez OT  Note: Limitation: Decreased ADL status, Decreased UE strength, Decreased Safe judgement during ADL, Decreased endurance, Decreased self-care trans, Decreased high-level ADLs  Prognosis: Good  Assessment: Pt is a 72 y o  female, admitted to [address] [place] [address] 5/10/2023 d/t experiencing dizzy with nausea and headaches  Dx: headache  Pt with PMHx impacting their performance during ADL tasks, including: anxiety, breast CA, esophagitis, HTN, bipolar disorder, hx falls  Prior to admission to the hospital Pt was performing ADLs without physical assistance  IADLs with physical assistance  Functional transfers/ambulation without physical assistance  Cognitive status was PTA was intact  OT order placed to assess Pt's ADLs, cognitive status, and performance during functional tasks in order to maximize safety and independence while making most appropriate d/c recommendations   Pt's clinical presentation is currently unstable/unpredictable given new onset deficits that effect Pt's occupational performance and ability to safely return to OF including decrease activity tolerance, decrease standing balance, decrease sitting balance, decrease performance during ADL tasks, decrease safety awareness , decrease UB MS, decrease generalized strength, decrease activity engagement, decrease performance during functional transfers, steps to enter home, limited family support, frequent falls, high fall risk and limited insight to deficits combined with medical complications of hypertension , abnormal potassium values, decreased skin integrity, multiple readmissions and need for input for mobility technique/safety  Personal factors affecting Pt at time of initial evaluation include: anxiety, multi-level environment, availability as recommended, limited home support, behavioral pattern, inability to perform current job functions, inability to perform IADLs, inability to perform ADLs, inability to ambulate household distances, inability to navigate community distances, limited insight into impairments, decreased initiation and engagement, recent fall(s)/fall history and questionable non-compliance  Pt will benefit from continued skilled OT services to address deficits as defined above and to maximize level independence/participation during ADLs and functional tasks to facilitate return toward PLOF and improved quality of life  From an occupational therapy standpoint, recommendation at time of d/c would be post acute rehab       OT Discharge Recommendation: Post acute rehabilitation services       5/11/2023 1232 by Temo Vidal OT  Note: Limitation: Decreased ADL status, Decreased UE strength, Decreased Safe judgement during ADL, Decreased endurance, Decreased self-care trans, Decreased high-level ADLs  Prognosis: Good        OT Discharge Recommendation: Post acute rehabilitation services

## 2023-05-11 NOTE — PLAN OF CARE
Problem: PHYSICAL THERAPY ADULT  Goal: Performs mobility at highest level of function for planned discharge setting  See evaluation for individualized goals  Description: Treatment/Interventions: ADL retraining, Functional transfer training, LE strengthening/ROM, Elevations, Therapeutic exercise, Endurance training, Patient/family training, Equipment eval/education, Bed mobility, Gait training, Compensatory technique education, Spoke to nursing, Spoke to case management, OT  Equipment Recommended:  (TBD by rehab)       See flowsheet documentation for full assessment, interventions and recommendations  Note: Prognosis: Good  Problem List: Decreased strength, Decreased endurance, Impaired balance, Decreased mobility, Impaired judgement, Decreased safety awareness  Assessment: Pt is a 72 y o  female seen for PT evaluation s/p admission to [address] [place] [address] on 5/10/2023 with Headache  Order placed for PT services    Upon evaluation: Pt is presenting with impaired functional mobility due to decreased strength, decreased endurance, impaired balance, gait deviations, decreased safety awareness, impaired judgment, and fall risk requiring  supervision assistance for bed mobility, steadying to minimal assistance for transfers, and minimal assistance for ambulation with RW   Pt's clinical presentation is currently unpredictable given the functional mobility deficits above, especially weakness, decreased endurance, gait deviations, decreased activity tolerance, decreased functional mobility tolerance, decreased safety awareness, impaired judgement, and dizziness and diplopia , coupled with fall risks as indicated by AM-PAC 6-Clicks: 64/62 as well as hx of falls, impulsivity, impaired balance, polypharmacy, impaired judgement, and decreased safety awareness and combined with medical complications of abnormal CBC, abnormal potassium values, multiple readmissions, fear/retreat and need for input for mobility technique/safety  Pt's PMHx and comorbidities that may affect physical performance and progress include: HTN and bipolar depression, ongoing dizziness, anxiety, h/o breast CA  Personal factors affecting pt at time of IE include: inaccessible home environment, step(s) to enter environment, multi-level environment, limited home support, inability to perform IADLs, inability to perform ADLs, inability to navigate level surfaces without external assistance, inability to ambulate household distances and recent fall(s)/fall history  Pt will benefit from continued skilled PT services to address deficits as defined above and to maximize level of functional mobility to facilitate return toward PLOF and improved QOL  From PT/mobility standpoint, recommendation at time of d/c would be post acute rehab ([facility]) pending progress in order to reduce fall risk and maximize pt's functional independence and consistency with mobility in order to facilitate return to PLOF  Recommend trial with walker next 1-2 sessions and ther ex next 1-2 sessions  Barriers to Discharge: Decreased caregiver support, Inaccessible home environment  Barriers to Discharge Comments: requires assistance to complete mobility  PT Discharge Recommendation: Post acute rehabilitation services    See flowsheet documentation for full assessment

## 2023-05-11 NOTE — ASSESSMENT & PLAN NOTE
· Patient presents from rehab with headache x 1-2, left-sided posterior  Migraine cocktail completed, headache has now resolved

## 2023-05-11 NOTE — OCCUPATIONAL THERAPY NOTE
Occupational Therapy Evaluation     Evaluation: 2324-9970  Treatment: 1358-0014    Patient Name: Nathan Baez  Today's Date: 5/11/2023  Problem List  Principal Problem:    Headache  Active Problems:    Bipolar depression ([facility] )    Primary hypertension    Severe protein-calorie malnutrition (HCC)    Nausea and vomiting    Dizziness    Hypokalemia    Past Medical History  Past Medical History:   Diagnosis Date   • Anxiety    • Breast cancer ([facility] )    • Disease of thyroid gland    • Esophagitis    • Hypertension      Past Surgical History  Past Surgical History:   Procedure Laterality Date   • BREAST LUMPECTOMY             05/11/23 1048   Note Type   Note type Evaluation   Pain Assessment   Pain Assessment Tool 0-10   Pain Score No Pain   Restrictions/Precautions   Other Precautions Chair Alarm; Bed Alarm; Fall Risk   Home Living   Type of Home House  (4 ALLEGRA B HR)   Home Layout Two level;1/2 bath on main level;Bed/bath upstairs  (17 steps B HR)   Bathroom Shower/Tub Tub/shower unit   Bathroom Toilet Standard   Bathroom Equipment Grab bars in shower   Home Equipment Walker;Cane  (using SPC PTA)   Additional Comments Pt reports living in a 2 story home with 4 ALLEGRA  Pt ambulates with SPC at baseline   Prior Function   Level of [place] Independent with ADLs; Independent with functional mobility   Lives With Alone   Receives Help From   (HHA 7 hours a week)   IADLs Family/Friend/Other provides transportation; Family/Friend/Other provides medication management   Falls in the last 6 months 1 to 4  (pt reports no falls, however had 3 falls reported in April admission)   Comments Pt reports completing ADLs and functional ambulation @ Mod I  Pt has assistance for IADLsa nd community mobility from [address] [place] [address] Rd and family   ADL   Where Assessed Edge of bed   Grooming Assistance 5  Supervision/Setup   Grooming Deficit Setup;Verbal cueing;Supervision/safety; Increased time to complete   UB Dressing Assistance 5  Supervision/Setup UB Dressing Deficit Setup;Verbal cueing;Supervision/safety; Increased time to complete   LB Dressing Assistance 3  Moderate Assistance   LB Dressing Deficit Steadying; Requires assistive device for steadying;Verbal cueing;Supervision/safety; Increased time to complete;Pull up over hips   Toileting Assistance  3  Moderate Assistance   Toileting Deficit Steadying;Verbal cueing;Supervison/safety; Increased time to complete;Perineal hygiene;Clothing management up;Clothing management down;Grab bar use   Additional Comments Pt completing ADL tasks whil seated at EOB  UB Dressing @ S after set-up  LB Dressing @ Mod A for balnace/safety while standing to complete CM around waist with UB supported from RW PRN  Pt swaying during task and requiring assistance to correct and for thoroughness of CM  Pt donning B socks @ S with increased time while seated  Pleaser efer to treatment note for performance durigng otileting and grooming tasks   Bed Mobility   Supine to Sit 5  Supervision   Additional items Increased time required;Verbal cues   Additional Comments HOB flat, no bedrails  Pt reports some dizziness upon sitting up although it resolved with rest   Transfers   Sit to Stand   [facility] Assist)   Additional items Increased time required;Verbal cues   Stand to Sit   (Steadying Assist)   Additional items Increased time required;Verbal cues   Stand pivot   (Min-Mod A)   Additional items Assist x 1; Increased time required;Verbal cues   Toilet transfer   (Steadying Assist)   Additional items Assist x 1; Increased time required;Verbal cues;Standard toilet   Additional Comments Pt completing functional transfers with use of RW for UB support  STS @ Steadying Assist and SPT @ Min-Mod A d/t multiple episodes of LOB d/t dizziness     Balance   Static Sitting Fair +   Dynamic Sitting Fair   Static Standing Fair -   Dynamic Standing Poor +   Activity Tolerance   Activity Tolerance Patient limited by fatigue   Medical Staff Made Aware Spoke "with PT, [address] SPoke with RN   RUE Assessment   RUE Assessment WFL   LUE Assessment   LUE Assessment WFL   Hand Function   Gross Motor Coordination Functional   Fine Motor Coordination Functional   Hand Function Comments slightly limited R hsoulder ROM d/t \"ewelina had 2 rotator cuff surgies\"  ROM remains functional for ADLs and functional transfers   Sensation   Light Touch No apparent deficits   Cognition   Overall Cognitive Status [facility]   Arousal/Participation Alert; Responsive; Cooperative   Attention Attends with cues to redirect   Orientation Level Oriented X4   Memory Within functional limits   Following Commands Follows one step commands without difficulty   Assessment   Limitation Decreased ADL status; Decreased UE strength;Decreased Safe judgement during ADL;Decreased endurance;Decreased self-care trans;Decreased high-level ADLs   Assessment Pt is a 72 y o  female, admitted to [address] [place] [address] 5/10/2023 d/t experiencing dizzy with nausea and headaches  Dx: headache  Pt with PMHx impacting their performance during ADL tasks, including: anxiety, breast CA, esophagitis, HTN, bipolar disorder, hx falls  Prior to admission to the hospital Pt was performing ADLs without physical assistance  IADLs with physical assistance  Functional transfers/ambulation without physical assistance  Cognitive status was PTA was intact  OT order placed to assess Pt's ADLs, cognitive status, and performance during functional tasks in order to maximize safety and independence while making most appropriate d/c recommendations   Pt's clinical presentation is currently unstable/unpredictable given new onset deficits that effect Pt's occupational performance and ability to safely return to OF including decrease activity tolerance, decrease standing balance, decrease sitting balance, decrease performance during ADL tasks, decrease safety awareness , decrease UB MS, decrease generalized strength, decrease activity engagement, decrease " performance during functional transfers, steps to enter home, limited family support, frequent falls, high fall risk and limited insight to deficits combined with medical complications of hypertension , abnormal potassium values, decreased skin integrity, multiple readmissions and need for input for mobility technique/safety  Personal factors affecting Pt at time of initial evaluation include: anxiety, multi-level environment, availability as recommended, limited home support, behavioral pattern, inability to perform current job functions, inability to perform IADLs, inability to perform ADLs, inability to ambulate household distances, inability to navigate community distances, limited insight into impairments, decreased initiation and engagement, recent fall(s)/fall history and questionable non-compliance  Pt will benefit from continued skilled OT services to address deficits as defined above and to maximize level independence/participation during ADLs and functional tasks to facilitate return toward PLOF and improved quality of life  From an occupational therapy standpoint, recommendation at time of d/c would be post acute rehab  Prognosis Good   Plan   Treatment Interventions ADL retraining;Functional transfer training;UE strengthening/ROM; Endurance training;Patient/family training;Equipment evaluation/education; Neuromuscular reeducation; Compensatory technique education;Continued evaluation; Activityengagement; Energy conservation   Goal Expiration Date 05/25/23   OT Treatment Day 1   OT Frequency 3-5x/wk   Recommendation   OT Discharge Recommendation Post acute rehabilitation services   AM-PAC Daily Activity Inpatient   Lower Body Dressing 2   Bathing 2   Toileting 2   Upper Body Dressing 3   Grooming 3   Eating 4   Daily Activity Raw Score 16   Daily Activity Standardized Score (Calc for Raw Score >=11) 35 96   Additional Treatment Session   Start Time 1107   End Time 1117   Treatment Assessment Pt seen "for treatment session #1 thi sdate  Pt alert jackie greeable to participate at this time  Pt completing short distance ambulation into restroom with use of RW for UB support @ Min-Mod A d/t balance deficits, increased dizzines and assistnace required for RW management  Pt then completing toileting tasks @ Mod A for balnace and asfety during CM around waist  Pt completing pericare @ SBA while seated  STS from toilet with grab bar @ [facility] gAssist  Pt then returning to recliner [facility], unable to maintain standing at sinkside at this time d/t dizziness  Pt then completin grooming tasks while seated in recliner @ S after set-up with increased time (Min A for brushing hair d/t increased knots)  Pt with occasional episodes of tremors and shaking while seated in recliner  Pt reports \"this happens sometimes\"  Pt seated OOB at end of session with call bell in reach, chair alarm intact and all needs met at this time   Additional Treatment Day 1     The patient's raw score on the AM-PAC Daily Activity Inpatient Short Form is 16  A raw score of less than 19 suggests the patient may benefit from discharge to post-acute rehabilitation services  Please refer to the recommendation of the Occupational Therapist for safe discharge planning  Pt goals to be met by 5/25/2023    1  Pt will demonstrate ability to complete LB dressing @ S in order to increase safety and independence during meaningful tasks  2  Pt will demonstrate ability to alessia/doff socks/shoes while sitting EOB @ S in order to increase safety and independence during meaningful tasks  3  Pt will demonstrate ability to complete toileting tasks including CM and pericare @ S in order to increase safety and independence during meaningful tasks  4  Pt will demonstrate ability to complete EOB, chair, toilet/commode transfers @ S in order to increase performance and participation during functional tasks    5  Pt will demonstrate ability to stand for 7-8 minutes while " maintaining G balance with use of RW for UB support PRN  6  Pt will demonstrate ability to tolerate 30-35 minute OT session with no vc'ing for deep breathing or use of energy conservation techniques in order to increase activity tolerance during functional tasks  7  Pt will demonstrate Good carryover of use of energy conservation/compensatory strategies during ADLs and functional tasks in order to increase safety and reduce risk for falls  8  Pt will demonstrate Good attention and participation in continued evaluation of functional ambulation house hold distances in order to assist with safe d/c planning  9  Pt will attend to continued cognitive assessments 100% of the time in order to provide most appropriate d/c recommendations  10  Pt will follow 100% simple 2-step commands and be A&O x4 consistently with environmental cues to increase participation in functional activities  11  Pt will identify 3 areas of interest/hobbies and 1 intervention on how to incorporate into daily life in order to increase interaction with environment and peers as well as increase participation in meaningful tasks  12  Pt will demonstrate 100% carryover of BUE HEP in order to increase BUE MS and increase performance during functional tasks upon d/c home      Katelyn Kearns OTR/L

## 2023-05-11 NOTE — CASE MANAGEMENT
Case Management Discharge Planning Note    Patient name Josiane Paredes  Location /-46 MRN [id number]  : 1950 Date 2023       Current Admission Date: 5/10/2023  Current Admission Diagnosis:Headache   Patient Active Problem List    Diagnosis Date Noted   • Nausea and vomiting 05/10/2023   • Dizziness 05/10/2023   • Headache 05/10/2023   • Hypokalemia 05/10/2023   • Hematochezia 2023   • Breast cancer ([facility] )    • Scalp laceration 2023   • Bipolar depression ([facility] ) 2023   • Primary hypertension 2023   • Gastric polyp 2023   • Severe protein-calorie malnutrition ([facility] ) 2023   • Diarrhea of presumed infectious origin 2023   • Syncope 2023      LOS (days): 1  Geometric Mean LOS (GMLOS) (days):   Days to GMLOS:     OBJECTIVE:            Current admission status: Observation   Preferred Pharmacy:   [facility], [address] [facility] [address] [place] [id number]  Phone: [phone] Fax: [phone]    PATIENT/FAMILY REPORTS NO PREFERRED PHARMACY  No address on file      Primary Care Provider: Nikolay Nieves MD    Primary Insurance: MEDICARE  Secondary Insurance: [address]:                        Pt is medically clear for discharge  Pt is from [place] [address] placing transportation request to [facility]             as per Cristhian Kill will  pt at [address] via [address] [place] [address]

## 2023-05-11 NOTE — PHYSICAL THERAPY NOTE
PHYSICAL THERAPY EVALUATION  NAME:  Nabil Horne  DATE: 05/11/23    AGE:   72 y o  Mrn:   [id number]  ADMIT DX:  Hypokalemia [E87 6]  Vertical nystagmus [H55 09]  Ataxia [R27 0]  Hemianopsia [H53 47]  Headache [R51 9]    Past Medical History:   Diagnosis Date   • Anxiety    • Breast cancer ([address] )    • Disease of thyroid gland    • Esophagitis    • Hypertension      Length Of Stay: 1  Performed at least 2 patient identifiers during session: Name and Birthday  PHYSICAL THERAPY EVALUATION :    05/11/23 1047   PT Last Visit   PT Visit Date 05/11/23   Note Type   Note type Evaluation   Pain Assessment   Pain Assessment Tool 0-10   Pain Score No Pain  (dizziness and swaying ant/post and laterally)   Restrictions/Precautions   Other Precautions Chair Alarm; Bed Alarm; Fall Risk   Home Living   Type of Home House  (4 ALLEGRA B HRs)   Home Layout Two level;1/2 bath on main level;Bed/bath upstairs  (17 steps B HRs)   Bathroom Shower/Tub Tub/shower unit   Bathroom Toilet Standard   Bathroom Equipment Grab bars in [facility] Cane;Walker  (was using cane)   Additional Comments Reports living in a [facility] with 4 ALLEGRA B HRs and was using cane prior to April admission, since has been using RW  Reported staying on 1st floor, however per previous admission, 2nd floor bed and bathroom  Prior Function   Level of [place] Independent with ADLs; Independent with functional mobility   Lives With Alone   Receives Help From   (HHA 7 hours a week)   IADLs Family/Friend/Other provides transportation; Family/Friend/Other provides medication management   Falls in the last 6 months 1 to 4  (pt reports no falls, however had 3 falls reported in April admission)   Comments Reports being independent with mobility, ADLs and has asisstance for IADLs, but does make her own meals  General   Additional Pertinent History Recently admitted 4/19/23 to 4/27/23 after a syncopal episode, fall down steps  Discharged to post acute rehab   Returns "form post acute rehab with headache, dizzines and vomiting  Cognition   Orientation Level Oriented X4   Following Commands Follows one step commands without difficulty   Subjective   Subjective \"I just started using a walker with therapy there  \"   RLE Assessment   RLE Assessment WFL  (4-/5)   LLE Assessment   LLE Assessment WFL  (4-/5)   Light Touch   RLE Light Touch Grossly intact   LLE Light Touch Grossly intact   Bed Mobility   Supine to Sit 5  Supervision   Additional items Increased time required;Verbal cues   Additional Comments HOB flat without bedrail  supervision to complete wiht co dizziness upon sitting at EOB  inc lateral sway > to right  Transfers   Sit to Stand   (steadying assistance)   Additional items Assist x 1; Increased time required;Verbal cues   Stand to Sit   (steadying assistance)   Additional items Impulsive;Verbal cues   Stand pivot 4  Minimal assistance   Additional items Assist x 1; Increased time required;Verbal cues   Additional Comments use of RW  sit to stand with steadying assistance with cues fo rhand placement for safety  spt wiht RW with minAx1 wiht min manual cues for balance and verbal cues for turning completely prior to sitting  Ambulation/Elevation   Gait pattern Wide TOYA;Narrow TOYA; Improper Weight shift; Forward Flexion;Decreased foot clearance; Foward flexed; Short stride; Step through pattern   Gait Assistance 4  Minimal assist   Additional items Assist x 1;Verbal cues   Assistive Device Rolling walker   Distance ambulated 14'x1 with RW with minAx1 with manual cues for wt shifting verbal cues for inc step length and foot clearance  manual cues for balance     Balance   Static Sitting Fair +   Dynamic Sitting Fair   Static Standing Fair -   Dynamic Standing Poor +   Ambulatory   (poor to poor +)   Activity Tolerance   Activity Tolerance Patient limited by fatigue   Medical Staff Made Aware Noah MORRIS   Nurse Made Aware RN   Assessment   Prognosis Good   Problem List " "Decreased strength;Decreased endurance; Impaired balance;Decreased mobility; Impaired judgement;Decreased safety awareness   Barriers to Discharge Decreased caregiver support; Inaccessible home environment   Barriers to Discharge Comments requires assistance to complete mobility   Goals   Patient Goals \"Go home\"   [facility] Expiration Date 05/25/23   PT Treatment Day 1   Plan   Treatment/Interventions ADL retraining;Functional transfer training;LE strengthening/ROM; Elevations; Therapeutic exercise; Endurance training;Patient/family training;Equipment eval/education; Bed mobility;Gait training; Compensatory technique education;Spoke to nursing;Spoke to case management;OT   PT Frequency 3-5x/wk   Recommendation   PT Discharge Recommendation Post acute rehabilitation services   Equipment Recommended   (TBD by rehab)   AM-PAC Basic Mobility Inpatient   Turning in Flat Bed Without Bedrails 3   Lying on Back to Sitting on Edge of Flat Bed Without Bedrails 3   Moving Bed to Chair 2  (see treatment note)   Standing Up From Chair Using Arms 3   Walk in Room 2  (see treatment note)   Climb 3-5 Stairs With Railing 1   Basic Mobility Inpatient Raw Score 14   Basic Mobility Standardized Score 35 55   Highest Level Of Mobility   -[facility] Goal 4: Move to chair/commode   -HL Achieved 7: Walk 25 feet or more   Additional Treatment Session   Start Time 1106   End Time 1124   Treatment Assessment Pt tolerated session poorly requiring increased asisstance to complete transfers and ambulation associated wiht dizziness  She demonstrates sudden onset of swaying ant/post and laterally wiht inc tremor  She requires cues to keep legs still and is able to after verbal cues  She requires increased verbal cues for safety  She is limited by decreased strength, balance, endurance  She will continue to benefit form PT services to maximize LOF  Equipment Use use of RW  sit<>stand with RW with steadying asisstance  cues of rhand placement for safety with RW   " spt with min/modAx1 wiht LOB to right, requires manual cues to recover  ambulated 25'x1 with RW with min/modAx1 with LOB to right requiring modAx1 to recover  verbal cues for staying wihtin TOYA of RW, stopping to readjust  pt then ambulated 28'x1 with RW with min to modAx1 with inc path deviation to right with LOB to right requiring manual cues for balance recovery and cues for uprihgt posture  pt wiht short shuffled steps with cues for inc step length and staying wihtin TOYA of RW  completed 1x10 ankle DF/PF, LAQ and hip flexion  cues for proper completion  pt reporting diplopia with therapist infront of her  reports better wiht one eye closed right eye over left eye  attempted to use eye patch  pt reporting better initially, but then diplopia within 30 seconds  patch removed  End of Consult   Patient Position at End of Consult Bedside chair;Bed/Chair alarm activated; All needs within reach       Pt requires PT/OT co-eval due to medical complexity, signficant assistance with mobility and/or cognitive-behavioral impairments  (Please find full objective findings from PT assessment regarding body systems outlined above)  Assessment: Pt is a 72 y o  female seen for PT evaluation s/p admission to [address] [place] [address] on 5/10/2023 with Headache  Order placed for PT services    Upon evaluation: Pt is presenting with impaired functional mobility due to decreased strength, decreased endurance, impaired balance, gait deviations, decreased safety awareness, impaired judgment, and fall risk requiring  supervision assistance for bed mobility, steadying to minimal assistance for transfers, and minimal assistance for ambulation with RW   Pt's clinical presentation is currently unpredictable given the functional mobility deficits above, especially weakness, decreased endurance, gait deviations, decreased activity tolerance, decreased functional mobility tolerance, decreased safety awareness, impaired judgement, and dizziness and diplopia , coupled with fall risks as indicated by AM-PAC 6-Clicks: 39/55 as well as hx of falls, impulsivity, impaired balance, polypharmacy, impaired judgement, and decreased safety awareness and combined with medical complications of abnormal CBC, abnormal potassium values, multiple readmissions, fear/retreat and need for input for mobility technique/safety  Pt's PMHx and comorbidities that may affect physical performance and progress include: HTN and bipolar depression, ongoing dizziness, anxiety, h/o breast CA  Personal factors affecting pt at time of IE include: inaccessible home environment, step(s) to enter environment, multi-level environment, limited home support, inability to perform IADLs, inability to perform ADLs, inability to navigate level surfaces without external assistance, inability to ambulate household distances and recent fall(s)/fall history  Pt will benefit from continued skilled PT services to address deficits as defined above and to maximize level of functional mobility to facilitate return toward PLOF and improved QOL  From PT/mobility standpoint, recommendation at time of d/c would be post acute rehab ([facility]) pending progress in order to reduce fall risk and maximize pt's functional independence and consistency with mobility in order to facilitate return to PLOF  Recommend trial with walker next 1-2 sessions and ther ex next 1-2 sessions  The patient's AM-PAC Basic Mobility Inpatient Short Form Raw Score is 14  A Raw score of less than or equal to 16 suggests the patient may benefit from discharge to post-acute rehabilitation services  Please also refer to the recommendation of the Physical Therapist for safe discharge planning  Goals: Pt will: Perform bed mobility tasks with modified Independent to reposition in bed and prepare for transfers   Pt will perform transfers with supervision to decrease burden of care and decrease risk for falls and prepare for ambulation  Pt will ambulate with LRAD for >/= 150' with  supervision  to decrease burden of care, decrease risk for falls, improve activity tolerance and improve gait quality and to access home environment  Pt will complete >/= 4 steps with bilateral handrails with steadying assistance to decrease burden of care, return to home with ALLEGRA, return to [facility] home, decrease risk for falls and improve activity tolerance  Pt will participate in objective balance assessment to determine baseline fall risk  Pt will participate in SSWS assessment to determine level of mobility  Pt will increase B LE strength >/= 1/2 MMT grade to facilitate functional mobility        Meryl Mendoza, PT,DPT

## 2023-05-11 NOTE — ASSESSMENT & PLAN NOTE
Patient has hypertension on losartan at home  Blood pressure is elevated in the ER, did not take medications this morning due to nausea  Continue losartan 50mg daily

## 2023-05-11 NOTE — PLAN OF CARE
Problem: PAIN - ADULT  Goal: Verbalizes/displays adequate comfort level or baseline comfort level  Description: Interventions:  - Encourage patient to monitor pain and request assistance  - Assess pain using appropriate pain scale  - Administer analgesics based on type and severity of pain and evaluate response  - Implement non-pharmacological measures as appropriate and evaluate response  - Consider cultural and social influences on pain and pain management  - Notify physician/advanced practitioner if interventions unsuccessful or patient reports new pain  Outcome: Progressing     Problem: INFECTION - ADULT  Goal: Absence or prevention of progression during hospitalization  Description: INTERVENTIONS:  - Assess and monitor for signs and symptoms of infection  - Monitor lab/diagnostic results  - Monitor all insertion sites, i e  indwelling lines, tubes, and drains  - Monitor endotracheal if appropriate and nasal secretions for changes in amount and color  - [place] appropriate cooling/warming therapies per order  - Administer medications as ordered  - Instruct and encourage patient and family to use good hand hygiene technique  - Identify and instruct in appropriate isolation precautions for identified infection/condition  Outcome: Progressing  Goal: Absence of fever/infection during neutropenic period  Description: INTERVENTIONS:  - Monitor WBC    Outcome: Progressing     Problem: SAFETY ADULT  Goal: Patient will remain free of falls  Description: INTERVENTIONS:  - Educate patient/family on patient safety including physical limitations  - Instruct patient to call for assistance with activity   - Consult OT/PT to assist with strengthening/mobility   - Keep Call bell within reach  - Keep bed low and locked with side rails adjusted as appropriate  - Keep care items and personal belongings within reach  - Initiate and maintain comfort rounds  - Make Fall Risk Sign visible to staff  - Offer Toileting every 2 Hours, in advance of need  - Initiate/Maintain bed/chair alarm  - Obtain necessary fall risk management equipment:   - Apply yellow socks and bracelet for high fall risk patients  - Consider moving patient to room near nurses station  Outcome: Progressing  Goal: Maintain or return to baseline ADL function  Description: INTERVENTIONS:  -  Assess patient's ability to carry out ADLs; assess patient's baseline for ADL function and identify physical deficits which impact ability to perform ADLs (bathing, care of mouth/teeth, toileting, grooming, dressing, etc )  - Assess/evaluate cause of self-care deficits   - Assess range of motion  - Assess patient's mobility; develop plan if impaired  - Assess patient's need for assistive devices and provide as appropriate  - Encourage maximum independence but intervene and supervise when necessary  - Involve family in performance of ADLs  - Assess for home care needs following discharge   - Consider OT consult to assist with ADL evaluation and planning for discharge  - Provide patient education as appropriate  Outcome: Progressing  Goal: Maintains/Returns to pre admission functional level  Description: INTERVENTIONS:  - Perform BMAT or MOVE assessment daily    - Set and communicate daily mobility goal to care team and patient/family/caregiver  - Collaborate with rehabilitation services on mobility goals if consulted  - Perform Range of Motion 3 times a day  - Reposition patient every 2 hours    - Dangle patient 3 times a day  - Stand patient 3 times a day  - Ambulate patient 3 times a day  - Out of bed to chair 3 times a day   - Out of bed for meals 3 times a day  - Out of bed for toileting  - Record patient progress and toleration of activity level   Outcome: Progressing     Problem: DISCHARGE PLANNING  Goal: Discharge to home or other facility with appropriate resources  Description: INTERVENTIONS:  - Identify barriers to discharge w/patient and caregiver  - Arrange for needed discharge resources and transportation as appropriate  - Identify discharge learning needs (meds, wound care, etc )  - Arrange for interpretive services to assist at discharge as needed  - Refer to Case Management Department for coordinating discharge planning if the patient needs post-hospital services based on physician/advanced practitioner order or complex needs related to functional status, cognitive ability, or social support system  Outcome: Progressing     Problem: Knowledge Deficit  Goal: Patient/family/caregiver demonstrates understanding of disease process, treatment plan, medications, and discharge instructions  Description: Complete learning assessment and assess knowledge base    Interventions:  - Provide teaching at level of understanding  - Provide teaching via preferred learning methods  Outcome: Progressing     Problem: NEUROSENSORY - ADULT  Goal: Achieves stable or improved neurological status  Description: INTERVENTIONS  - Monitor and report changes in neurological status  - Monitor vital signs such as temperature, blood pressure, glucose, and any other labs ordered   - Initiate measures to prevent increased intracranial pressure  - Monitor for seizure activity and implement precautions if appropriate      Outcome: Progressing  Goal: Remains free of injury related to seizures activity  Description: INTERVENTIONS  - Maintain airway, patient safety  and administer oxygen as ordered  - Monitor patient for seizure activity, document and report duration and description of seizure to physician/advanced practitioner  - If seizure occurs,  ensure patient safety during seizure  - Reorient patient post seizure  - Seizure pads on all 4 side rails  - Instruct patient/family to notify RN of any seizure activity including if an aura is experienced  - Instruct patient/family to call for assistance with activity based on nursing assessment  - Administer anti-seizure medications if ordered    Outcome: Progressing  Goal: Achieves maximal functionality and self care  Description: INTERVENTIONS  - Monitor swallowing and airway patency with patient fatigue and changes in neurological status  - Encourage and assist patient to increase activity and self care     - Encourage visually impaired, hearing impaired and aphasic patients to use assistive/communication devices  Outcome: Progressing     Problem: METABOLIC, FLUID AND ELECTROLYTES - ADULT  Goal: Electrolytes maintained within normal limits  Description: INTERVENTIONS:  - Monitor labs and assess patient for signs and symptoms of electrolyte imbalances  - Administer electrolyte replacement as ordered  - Monitor response to electrolyte replacements, including repeat lab results as appropriate  - Instruct patient on fluid and nutrition as appropriate  Outcome: Progressing  Goal: Fluid balance maintained  Description: INTERVENTIONS:  - Monitor labs   - Monitor I/O and WT  - Instruct patient on fluid and nutrition as appropriate  - Assess for signs & symptoms of volume excess or deficit  Outcome: Progressing  Goal: Glucose maintained within target range  Description: INTERVENTIONS:  - Monitor Blood Glucose as ordered  - Assess for signs and symptoms of hyperglycemia and hypoglycemia  - Administer ordered medications to maintain glucose within target range  - Assess nutritional intake and initiate nutrition service referral as needed  Outcome: Progressing     Problem: MOBILITY - ADULT  Goal: Maintain or return to baseline ADL function  Description: INTERVENTIONS:  -  Assess patient's ability to carry out ADLs; assess patient's baseline for ADL function and identify physical deficits which impact ability to perform ADLs (bathing, care of mouth/teeth, toileting, grooming, dressing, etc )  - Assess/evaluate cause of self-care deficits   - Assess range of motion  - Assess patient's mobility; develop plan if impaired  - Assess patient's need for assistive devices and provide as appropriate  - Encourage maximum independence but intervene and supervise when necessary  - Involve family in performance of ADLs  - Assess for home care needs following discharge   - Consider OT consult to assist with ADL evaluation and planning for discharge  - Provide patient education as appropriate  Outcome: Progressing  Goal: Maintains/Returns to pre admission functional level  Description: INTERVENTIONS:  - Perform BMAT or MOVE assessment daily    - Set and communicate daily mobility goal to care team and patient/family/caregiver  - Collaborate with rehabilitation services on mobility goals if consulted  - Perform Range of Motion 3 times a day  - Reposition patient every 2 hours    - Dangle patient 3 times a day  - Stand patient 3 times a day  - Ambulate patient 3 times a day  - Out of bed to chair 3 times a day   - Out of bed for meals 3 times a day  - Out of bed for toileting  - Record patient progress and toleration of activity level   Outcome: Progressing     Problem: Prexisting or High Potential for Compromised Skin Integrity  Goal: Skin integrity is maintained or improved  Description: INTERVENTIONS:  - Identify patients at risk for skin breakdown  - Assess and monitor skin integrity  - Assess and monitor nutrition and hydration status  - Monitor labs   - Assess for incontinence   - Turn and reposition patient  - Assist with mobility/ambulation  - Relieve pressure over bony prominences  - Avoid friction and shearing  - Provide appropriate hygiene as needed including keeping skin clean and dry  - Evaluate need for skin moisturizer/barrier cream  - Collaborate with interdisciplinary team   - Patient/family teaching  - Consider wound care consult   Outcome: Progressing

## 2023-05-11 NOTE — PLAN OF CARE
Problem: PAIN - ADULT  Goal: Verbalizes/displays adequate comfort level or baseline comfort level  Description: Interventions:  - Encourage patient to monitor pain and request assistance  - Assess pain using appropriate pain scale  - Administer analgesics based on type and severity of pain and evaluate response  - Implement non-pharmacological measures as appropriate and evaluate response  - Consider cultural and social influences on pain and pain management  - Notify physician/advanced practitioner if interventions unsuccessful or patient reports new pain  Outcome: Progressing     Problem: INFECTION - ADULT  Goal: Absence or prevention of progression during hospitalization  Description: INTERVENTIONS:  - Assess and monitor for signs and symptoms of infection  - Monitor lab/diagnostic results  - Monitor all insertion sites, i e  indwelling lines, tubes, and drains  - Monitor endotracheal if appropriate and nasal secretions for changes in amount and color  - [place] appropriate cooling/warming therapies per order  - Administer medications as ordered  - Instruct and encourage patient and family to use good hand hygiene technique  - Identify and instruct in appropriate isolation precautions for identified infection/condition  Outcome: Progressing  Goal: Absence of fever/infection during neutropenic period  Description: INTERVENTIONS:  - Monitor WBC    Outcome: Progressing     Problem: SAFETY ADULT  Goal: Patient will remain free of falls  Description: INTERVENTIONS:  - Educate patient/family on patient safety including physical limitations  - Instruct patient to call for assistance with activity   - Consult OT/PT to assist with strengthening/mobility   - Keep Call bell within reach  - Keep bed low and locked with side rails adjusted as appropriate  - Keep care items and personal belongings within reach  - Initiate and maintain comfort rounds  - Make Fall Risk Sign visible to staff  - Offer Toileting every 2 Hours, in advance of need  - Initiate/Maintain bed/chair alarm  - Obtain necessary fall risk management equipment:   - Apply yellow socks and bracelet for high fall risk patients  - Consider moving patient to room near nurses station  Outcome: Progressing  Goal: Maintain or return to baseline ADL function  Description: INTERVENTIONS:  -  Assess patient's ability to carry out ADLs; assess patient's baseline for ADL function and identify physical deficits which impact ability to perform ADLs (bathing, care of mouth/teeth, toileting, grooming, dressing, etc )  - Assess/evaluate cause of self-care deficits   - Assess range of motion  - Assess patient's mobility; develop plan if impaired  - Assess patient's need for assistive devices and provide as appropriate  - Encourage maximum independence but intervene and supervise when necessary  - Involve family in performance of ADLs  - Assess for home care needs following discharge   - Consider OT consult to assist with ADL evaluation and planning for discharge  - Provide patient education as appropriate  Outcome: Progressing  Goal: Maintains/Returns to pre admission functional level  Description: INTERVENTIONS:  - Perform BMAT or MOVE assessment daily    - Set and communicate daily mobility goal to care team and patient/family/caregiver  - Collaborate with rehabilitation services on mobility goals if consulted  - Perform Range of Motion 3 times a day  - Reposition patient every 2 hours    - Dangle patient 3 times a day  - Stand patient 3 times a day  - Ambulate patient 3 times a day  - Out of bed to chair 3 times a day   - Out of bed for meals 3 times a day  - Out of bed for toileting  - Record patient progress and toleration of activity level   Outcome: Progressing     Problem: DISCHARGE PLANNING  Goal: Discharge to home or other facility with appropriate resources  Description: INTERVENTIONS:  - Identify barriers to discharge w/patient and caregiver  - Arrange for needed discharge resources and transportation as appropriate  - Identify discharge learning needs (meds, wound care, etc )  - Arrange for interpretive services to assist at discharge as needed  - Refer to Case Management Department for coordinating discharge planning if the patient needs post-hospital services based on physician/advanced practitioner order or complex needs related to functional status, cognitive ability, or social support system  Outcome: Progressing     Problem: Knowledge Deficit  Goal: Patient/family/caregiver demonstrates understanding of disease process, treatment plan, medications, and discharge instructions  Description: Complete learning assessment and assess knowledge base    Interventions:  - Provide teaching at level of understanding  - Provide teaching via preferred learning methods  Outcome: Progressing     Problem: NEUROSENSORY - ADULT  Goal: Achieves stable or improved neurological status  Description: INTERVENTIONS  - Monitor and report changes in neurological status  - Monitor vital signs such as temperature, blood pressure, glucose, and any other labs ordered   - Initiate measures to prevent increased intracranial pressure  - Monitor for seizure activity and implement precautions if appropriate      Outcome: Progressing  Goal: Remains free of injury related to seizures activity  Description: INTERVENTIONS  - Maintain airway, patient safety  and administer oxygen as ordered  - Monitor patient for seizure activity, document and report duration and description of seizure to physician/advanced practitioner  - If seizure occurs,  ensure patient safety during seizure  - Reorient patient post seizure  - Seizure pads on all 4 side rails  - Instruct patient/family to notify RN of any seizure activity including if an aura is experienced  - Instruct patient/family to call for assistance with activity based on nursing assessment  - Administer anti-seizure medications if ordered    Outcome: Progressing  Goal: Achieves maximal functionality and self care  Description: INTERVENTIONS  - Monitor swallowing and airway patency with patient fatigue and changes in neurological status  - Encourage and assist patient to increase activity and self care     - Encourage visually impaired, hearing impaired and aphasic patients to use assistive/communication devices  Outcome: Progressing     Problem: METABOLIC, FLUID AND ELECTROLYTES - ADULT  Goal: Electrolytes maintained within normal limits  Description: INTERVENTIONS:  - Monitor labs and assess patient for signs and symptoms of electrolyte imbalances  - Administer electrolyte replacement as ordered  - Monitor response to electrolyte replacements, including repeat lab results as appropriate  - Instruct patient on fluid and nutrition as appropriate  Outcome: Progressing  Goal: Fluid balance maintained  Description: INTERVENTIONS:  - Monitor labs   - Monitor I/O and WT  - Instruct patient on fluid and nutrition as appropriate  - Assess for signs & symptoms of volume excess or deficit  Outcome: Progressing  Goal: Glucose maintained within target range  Description: INTERVENTIONS:  - Monitor Blood Glucose as ordered  - Assess for signs and symptoms of hyperglycemia and hypoglycemia  - Administer ordered medications to maintain glucose within target range  - Assess nutritional intake and initiate nutrition service referral as needed  Outcome: Progressing     Problem: MOBILITY - ADULT  Goal: Maintain or return to baseline ADL function  Description: INTERVENTIONS:  -  Assess patient's ability to carry out ADLs; assess patient's baseline for ADL function and identify physical deficits which impact ability to perform ADLs (bathing, care of mouth/teeth, toileting, grooming, dressing, etc )  - Assess/evaluate cause of self-care deficits   - Assess range of motion  - Assess patient's mobility; develop plan if impaired  - Assess patient's need for assistive devices and provide as appropriate  - Encourage maximum independence but intervene and supervise when necessary  - Involve family in performance of ADLs  - Assess for home care needs following discharge   - Consider OT consult to assist with ADL evaluation and planning for discharge  - Provide patient education as appropriate  Outcome: Progressing  Goal: Maintains/Returns to pre admission functional level  Description: INTERVENTIONS:  - Perform BMAT or MOVE assessment daily    - Set and communicate daily mobility goal to care team and patient/family/caregiver  - Collaborate with rehabilitation services on mobility goals if consulted  - Perform Range of Motion 3 times a day  - Reposition patient every 2 hours    - Dangle patient 3 times a day  - Stand patient 3 times a day  - Ambulate patient 3 times a day  - Out of bed to chair 3 times a day   - Out of bed for meals 3 times a day  - Out of bed for toileting  - Record patient progress and toleration of activity level   Outcome: Progressing     Problem: Prexisting or High Potential for Compromised Skin Integrity  Goal: Skin integrity is maintained or improved  Description: INTERVENTIONS:  - Identify patients at risk for skin breakdown  - Assess and monitor skin integrity  - Assess and monitor nutrition and hydration status  - Monitor labs   - Assess for incontinence   - Turn and reposition patient  - Assist with mobility/ambulation  - Relieve pressure over bony prominences  - Avoid friction and shearing  - Provide appropriate hygiene as needed including keeping skin clean and dry  - Evaluate need for skin moisturizer/barrier cream  - Collaborate with interdisciplinary team   - Patient/family teaching  - Consider wound care consult   Outcome: Progressing

## 2023-05-12 LAB — MRSA NOSE QL CULT: NORMAL

## 2023-05-15 LAB
ATRIAL RATE: 84 BPM
LAMOTRIGINE SERPL-MCNC: 19.9 UG/ML (ref 2–20)
P AXIS: 57 DEGREES
PR INTERVAL: 168 MS
QRS AXIS: 22 DEGREES
QRSD INTERVAL: 84 MS
QT INTERVAL: 386 MS
QTC INTERVAL: 456 MS
T WAVE AXIS: 100 DEGREES
VENTRICULAR RATE: 84 BPM
VIT B1 BLD-SCNC: 124.4 NMOL/L (ref 66.5–200)

## 2024-01-14 NOTE — ASSESSMENT & PLAN NOTE
· S/p syncopal event falling down wooden steps  · Sustained scalp laceration  · Repaired with staple and Dermabond  · Appreciate wound care consultation  · Pain control with Tylenol Warm

## 2025-05-10 NOTE — PROGRESS NOTES
Patient had 1 episode of coffee brown emesis today and than had 2 large bowel movements which were maroon in color  hold dvt prophylaxis and aspirin  Check cbc q6hr    Keep npo and informed gi  Placed on protonix 40 mg iv bid No